# Patient Record
Sex: FEMALE | Race: WHITE | Employment: OTHER | URBAN - METROPOLITAN AREA
[De-identification: names, ages, dates, MRNs, and addresses within clinical notes are randomized per-mention and may not be internally consistent; named-entity substitution may affect disease eponyms.]

---

## 2017-01-03 DIAGNOSIS — F41.8 DEPRESSION WITH ANXIETY: Primary | ICD-10-CM

## 2017-01-03 NOTE — TELEPHONE ENCOUNTER
[facility] Call Center    Phone Message    Name of Caller: Nancy    Phone Number: Home number on file [phone] (home)    Best time to return call: ANY    May a detailed message be left on voicemail: yes    Relation to patient: Self    Reason for Call: Medication Refill Request    Has the patient contacted the pharmacy for the refill? Yes   Name of medication being requested: FLUoxetine (PROZAC) 20 MG capsule [66425] (Order [id number])  Provider who prescribed the medication: LEEROY  Pharmacy: [facility]  Date medication is needed: ASAP         Action Taken: Message routed to:  Primary Care p [phone]

## 2017-01-03 NOTE — TELEPHONE ENCOUNTER
FLUoxetine (PROZAC) 20 MG capsule     Last Written Prescription Date: 9/28/16  Last Fill Quantity: 90, # refills: 1  Last Office Visit with [facility] primary care provider:  11/28/16   Next 5 appointments (look out 90 days)     Jan 25, 2017  9:20 AM   [facility] with Candy Tru[facility]illo [facility]   [facility]    [address] [place] [address]   [phone]            Mar 06, 2017  9:20 AM   Office Visit with Candy Trujillo MD   [facility]    [address] [place] [address]   [phone]            Apr 03, 2017 10:30 AM   Return Visit with Lee Reyes MD, [facility] NURSE ONLY   [facility]    [address] [place] [address]   [phone]                   Last PHQ-9 score on record=   PHQ-9 SCORE 11/28/2016   Total Score 3

## 2017-01-04 NOTE — TELEPHONE ENCOUNTER
Medication filled for 3 months per protocol.      Katie Prater RN, [facility]

## 2017-01-23 ENCOUNTER — DOCUMENTATION ONLY (OUTPATIENT)
Dept: LAB | Facility: CLINIC | Age: 82
End: 2017-01-23

## 2017-01-23 DIAGNOSIS — D50.9 IRON DEFICIENCY ANEMIA, UNSPECIFIED IRON DEFICIENCY ANEMIA TYPE: Primary | ICD-10-CM

## 2017-03-06 ENCOUNTER — OFFICE VISIT (OUTPATIENT)
Dept: PEDIATRICS | Facility: CLINIC | Age: 82
End: 2017-03-06
Payer: COMMERCIAL

## 2017-03-06 VITALS
WEIGHT: 121.5 LBS | TEMPERATURE: 98.3 F | BODY MASS INDEX: 20.24 KG/M2 | DIASTOLIC BLOOD PRESSURE: 54 MMHG | SYSTOLIC BLOOD PRESSURE: 112 MMHG | HEART RATE: 52 BPM | HEIGHT: 65 IN | OXYGEN SATURATION: 94 %

## 2017-03-06 DIAGNOSIS — D50.8 IRON DEFICIENCY ANEMIA SECONDARY TO INADEQUATE DIETARY IRON INTAKE: ICD-10-CM

## 2017-03-06 DIAGNOSIS — G25.81 RESTLESS LEG SYNDROME: ICD-10-CM

## 2017-03-06 DIAGNOSIS — E78.5 HYPERLIPIDEMIA LDL GOAL <130: ICD-10-CM

## 2017-03-06 DIAGNOSIS — J42 CHRONIC BRONCHITIS, UNSPECIFIED CHRONIC BRONCHITIS TYPE (H): Primary | ICD-10-CM

## 2017-03-06 DIAGNOSIS — D50.9 IRON DEFICIENCY ANEMIA, UNSPECIFIED IRON DEFICIENCY ANEMIA TYPE: ICD-10-CM

## 2017-03-06 DIAGNOSIS — E78.5 HYPERLIPIDEMIA LDL GOAL <100: ICD-10-CM

## 2017-03-06 LAB
CHOLEST SERPL-MCNC: 212 MG/DL
ERYTHROCYTE [DISTWIDTH] IN BLOOD BY AUTOMATED COUNT: 16.1 % (ref 10–15)
HCT VFR BLD AUTO: 38.8 % (ref 35–47)
HDLC SERPL-MCNC: 104 MG/DL
HGB BLD-MCNC: 12.6 G/DL (ref 11.7–15.7)
IRON SATN MFR SERPL: 22 % (ref 15–46)
IRON SERPL-MCNC: 86 UG/DL (ref 35–180)
LDLC SERPL CALC-MCNC: 92 MG/DL
MCH RBC QN AUTO: 30.1 PG (ref 26.5–33)
MCHC RBC AUTO-ENTMCNC: 32.5 G/DL (ref 31.5–36.5)
MCV RBC AUTO: 93 FL (ref 78–100)
NONHDLC SERPL-MCNC: 108 MG/DL
PLATELET # BLD AUTO: 256 10E9/L (ref 150–450)
RBC # BLD AUTO: 4.19 10E12/L (ref 3.8–5.2)
RETICS # AUTO: 50.7 10E9/L (ref 25–95)
RETICS/RBC NFR AUTO: 1.2 % (ref 0.5–2)
TIBC SERPL-MCNC: 400 UG/DL (ref 240–430)
TRIGL SERPL-MCNC: 79 MG/DL
WBC # BLD AUTO: 6.9 10E9/L (ref 4–11)

## 2017-03-06 PROCEDURE — 99214 OFFICE O/P EST MOD 30 MIN: CPT | Performed by: INTERNAL MEDICINE

## 2017-03-06 PROCEDURE — 36415 COLL VENOUS BLD VENIPUNCTURE: CPT | Performed by: INTERNAL MEDICINE

## 2017-03-06 PROCEDURE — 83540 ASSAY OF IRON: CPT | Performed by: INTERNAL MEDICINE

## 2017-03-06 PROCEDURE — 85027 COMPLETE CBC AUTOMATED: CPT | Performed by: INTERNAL MEDICINE

## 2017-03-06 PROCEDURE — 83550 IRON BINDING TEST: CPT | Performed by: INTERNAL MEDICINE

## 2017-03-06 PROCEDURE — 85045 AUTOMATED RETICULOCYTE COUNT: CPT | Performed by: INTERNAL MEDICINE

## 2017-03-06 PROCEDURE — 80061 LIPID PANEL: CPT | Performed by: INTERNAL MEDICINE

## 2017-03-06 NOTE — NURSING NOTE
"Chief Complaint   Patient presents with     Recheck Medication       Initial /54 (BP Location: Right arm, Patient Position: Chair, Cuff Size: Adult Small)  Pulse 52  Temp 98.3  F (36.8  C) (Temporal)  Ht 5' 5\" (1.651 m)  Wt 121 lb 8 oz (55.1 kg)  SpO2 94%  BMI 20.22 kg/m2 Estimated body mass index is 20.22 kg/(m^2) as calculated from the following:    Height as of this encounter: 5' 5\" (1.651 m).    Weight as of this encounter: 121 lb 8 oz (55.1 kg).  Medication Reconciliation: complete     Brie Galindo CMA  "

## 2017-03-06 NOTE — MR AVS SNAPSHOT
After Visit Summary   3/6/2017    Nancy Benz    MRN: [id number]           Patient Information     Date Of Birth          8/5/1922        Visit Information        Provider Department      3/6/2017 9:20 AM Candy Trujillo MD [facility]        Today's Diagnoses     Chronic bronchitis, unspecified chronic bronchitis type (H)    -  1    Hyperlipidemia LDL goal <130        Restless leg syndrome        Iron deficiency anemia secondary to inadequate dietary iron intake        Hyperlipidemia LDL goal <100          Care Instructions    Make appointment(s) for:   -- annual wellness with fasting labs in 6 months.             Follow-ups after your visit        Your next 10 appointments already scheduled     Apr 03, 2017 10:30 AM CDT   Return Visit with Lee Reyes MD, [facility] NURSE ONLY   [facility])    [address] [place] [address] [phone]            Apr 04, 2017  9:00 AM CDT   Return Visit with Sobia Terrazas MD   [facility])    [address] [place] [address] [phone]            Apr 05, 2017 10:00 AM CDT   Return Visit with Sam Amato DPM   [facility])    [address] [place] [address] [phone]            Sep 27, 2017 10:00 AM CDT   Return Visit with Zayra Sanchez MD   [facility])    [address] [place] [id number] [phone]              Who to contact     If you have questions or need follow up information about today's clinic visit or your schedule please contact [facility] directly at [phone].  Normal or non-critical lab and imaging results will be communicated to you by MyChart, letter or phone within 4 business days after the clinic has received the results. If you  "do not hear from us within 7 days, please contact the clinic through [employer] or phone. If you have a critical or abnormal lab result, we will notify you by phone as soon as possible.  Submit refill requests through [employer] or call your pharmacy and they will forward the refill request to us. Please allow 3 business days for your refill to be completed.          Additional Information About Your Visit        [employer] Information     [employer] gives you secure access to your electronic health record. If you see a primary care provider, you can also send messages to your care team and make appointments. If you have questions, please call your primary care clinic.  If you do not have a primary care provider, please call [phone] and they will assist you.      [employer] is an electronic gateway that provides easy, online access to your medical records. With [employer], you can request a clinic appointment, read your test results, renew a prescription or communicate with your care team.     To access your existing account, please contact your [facility] or call [phone] for assistance.        Care EveryWhere ID     This is your Care EveryWhere ID. This could be used by other organizations to access your [place] medical records  [id number]        Your Vitals Were     Pulse Temperature Height Pulse Oximetry BMI (Body Mass Index)       52 98.3  F (36.8  C) (Temporal) 5' 5\" (1.651 m) 94% 20.22 kg/m2        Blood Pressure from Last 3 Encounters:   03/06/17 112/54   11/29/16 114/61   11/28/16 124/60    Weight from Last 3 Encounters:   03/06/17 121 lb 8 oz (55.1 kg)   11/29/16 124 lb (56.2 kg)   11/28/16 124 lb (56.2 kg)              We Performed the Following     Lipid Profile with reflex to direct LDL          Today's Medication Changes          These changes are accurate as of: 3/6/17 10:58 AM.  If you have any questions, ask your nurse or doctor.               These medicines have changed or " have updated prescriptions.        Dose/Directions    polyethylene glycol powder   Commonly known as:  MIRALAX   This may have changed:    - when to take this  - reasons to take this   Used for:  Chronic constipation        Dose:  1 capful   Take 17 g (1 capful) by mouth daily   Quantity:  510 g   Refills:  11                Primary Care Provider Office Phone # Fax #    Candy Trujillo [phone]       [facility] [address]  [facility] [address]        Thank you!     Thank you for choosing [facility]  for your care. Our goal is always to provide you with excellent care. Hearing back from our patients is one way we can continue to improve our services. Please take a few minutes to complete the written survey that you may receive in the mail after your visit with us. Thank you!             Your Updated Medication List - Protect others around you: Learn how to safely use, store and throw away your medicines at www.disposemymeds.org.          This list is accurate as of: 3/6/17 10:58 AM.  Always use your most recent med list.                   Brand Name Dispense Instructions for use    aspirin 81 MG tablet      Take 81 mg by mouth daily       bimatoprost 0.01 % Soln    LUMIGAN    3 Bottle    Place 1 drop into both eyes At Bedtime       brimonidine 0.15 % ophthalmic solution    ALPHAGAN-P     Apply 1 drop to eye       CALCIUM 600 + D 600-200 MG-UNIT Tabs   Generic drug:  Calcium Carb-Cholecalciferol      Take 1 tablet by mouth daily Vitamin F=6919 iu       ciclopirox 0.77 % cream    LOPROX    270 g    Apply topically 2 times daily To left big toe twice daily.       cycloSPORINE 0.05 % ophthalmic emulsion    RESTASIS    1 Box    Place 1 drop into both eyes every 12 hours       diltiazem 120 MG 24 hr capsule    DILACOR XR    90 capsule    Take 1 capsule (120 mg) by mouth daily       ferrous sulfate 325 (65 FE) MG tablet    IRON    30 tablet    Take 325 mg by mouth daily (with  breakfast) Reported on 3/6/2017       FLOVENT  MCG/ACT Inhaler   Generic drug:  fluticasone     36 g    INHALE 2 PUFFS INTO THE LUNGS 2 TIMES DAILY       FLUoxetine 20 MG capsule    PROzac    90 capsule    Take 1 capsule (20 mg) by mouth daily       fluticasone 50 MCG/ACT spray    FLONASE    1 Bottle    Spray 2 sprays into both nostrils daily       gabapentin 100 MG capsule    NEURONTIN    180 capsule    1-2 capsule late afternoon       GENTEAL SEVERE 0.3 % Gel ophthalmic gel   Generic drug:  hypromellose      Place 0.1 g (2 drops) into both eyes At Bedtime       polyethylene glycol powder    MIRALAX    510 g    Take 17 g (1 capful) by mouth daily       pravastatin 40 MG tablet    PRAVACHOL    45 tablet    Take 0.5 tablets (20 mg) by mouth daily       SYSTANE 0.4-0.3 % Soln ophthalmic solution   Generic drug:  polyethylene glycol 0.4%- propylene glycol 0.3%      Place 1 drop into both eyes 3 times daily as needed for dry eyes       VENTOLIN HFA IN      Inhale 2 puffs into the lungs every 6 hours as needed       zolpidem 5 MG tablet    AMBIEN    90 tablet    Take 0.5-1 tablets (2.5-5 mg) by mouth nightly as needed for sleep

## 2017-03-06 NOTE — PROGRESS NOTES
Dear Nancy,   Here are your recent results.   -- lipid panel a little worse than last fall. So go back to take pravastatin daily instead of every other day as we discussed.     Please call or Mychart to our office if you have further questions.     Candy Trujillo MD

## 2017-03-06 NOTE — PROGRESS NOTES
SUBJECTIVE:                                                    Nancy Benz is a 89 year old female who presents to clinic today for the following health issues:      COPD Follow-Up    Symptoms are currently: stable    Current fatigue or dyspnea with ambulation: stable     Shortness of breath: stable/Not much energy    Increased or change in Cough/Sputum: No    Fever(s): No    Baseline ambulation without stopping to rest about the same feet, blocks. Able to walk up one block with walker flights of stairs without stopping to rest. Cannot do stairs    Any ER/UC or hospital admissions since your last visit? No     History   Smoking Status     Never Smoker   Smokeless Tobacco     Not on file     No results found for: FEV1, ATJ2ZCW       Amount of exercise or physical activity: 3-4 days/week for an average of 25 minutes    Problems taking medications regularly: No    Medication side effects: weight gain  Diet: regular (no restrictions)      Updates:  1. Has several eye drops prescribed by her eye doctor.   2. Last 3 weeks, she wanted to cut down on her medication so has been taking diltiazem and pravastatin every other day.   3. Has a cold, clear runny nose for 3 weeks. But then all gone.   4. Leg swelling was better after stopping Requip, gabapentin is helping with restless leg. She has to take 2 capsules (100 mg/capsule). A podiatrist recommended she exercise her. She is doing more leg exercise. She thinks this helps.   5. Chronic dizziness: she has concluded that this is from her eyes. She feels better if she does not strain her eyes with reading but she loves reading.   6. COPD: her Flovent didn't arrive on time for a few days, she could tell she was more shortness of breath. Now back on Flovent, breathing is good now.   7. Iron deficiency anemia: negative hemoccult. She was put on iron supplement. Took it for only a few weeks, upsetting body and quit. She has increased spinach and iron rich foods in her diet.  "Has lab for recheck today.          Problem list, Medication list, Allergies, and Medical/Social/Surgical histories reviewed in [facility] and updated as appropriate.    OBJECTIVE:                                                    /54 (BP Location: Right arm, Patient Position: Chair, Cuff Size: Adult Small)  Pulse 52  Temp 98.3  F (36.8  C) (Temporal)  Ht 5' 5\" (1.651 m)  Wt 121 lb 8 oz (55.1 kg)  SpO2 94%  BMI 20.22 kg/m2    GEN: healthy, alert and no distress  HEENT: unremarkable.  Neck:     supple, no thyromegaly, no cervical lymphadenopathy.   CONCHIS:  CTA B/L, no wheezing or crackles.  CV:  RRR no murmur.  Intact distal pulses, good cap refill.  Abd: soft, normal active bowel sounds, non tender, non distended. No mass or organomegaly.   Ext:  no cyanosis, clubbing or edema.         Diagnostic test results:  Results for orders placed or performed in visit on 03/06/17 (from the past 24 hour(s))   Lipid Profile with reflex to direct LDL   Result Value Ref Range    Cholesterol 212 (H) <200 mg/dL    Triglycerides 79 <150 mg/dL    HDL Cholesterol 104 >49 mg/dL    LDL Cholesterol Calculated 92 <100 mg/dL    Non HDL Cholesterol 108 <130 mg/dL       Orders Only on 03/06/2017   Component Date Value Ref Range Status     WBC 03/06/2017 6.9  4.0 - 11.0 10e9/L Final     RBC Count 03/06/2017 4.19  3.8 - 5.2 10e12/L Final     Hemoglobin 03/06/2017 12.6  11.7 - 15.7 g/dL Final     Hematocrit 03/06/2017 38.8  35.0 - 47.0 % Final     MCV 03/06/2017 93  78 - 100 fl Final     MCH 03/06/2017 30.1  26.5 - 33.0 pg Final     MCHC 03/06/2017 32.5  31.5 - 36.5 g/dL Final     RDW 03/06/2017 16.1* 10.0 - 15.0 % Final     Platelet Count 03/06/2017 256  150 - 450 10e9/L Final     Iron 03/06/2017 86  35 - 180 ug/dL Final     Iron Binding Cap 03/06/2017 400  240 - 430 ug/dL Final     Iron Saturation Index 03/06/2017 22  15 - 46 % Final     % Retic 03/06/2017 1.2  0.5 - 2.0 % Final     Absolute Retic 03/06/2017 50.7  25 - 95 10e9/L Final      "     ASSESSMENT/PLAN:                                                      89 year old female with the following diagnoses and treatment plan:      ICD-10-CM    1. Chronic bronchitis, unspecified chronic bronchitis type (H) J42    2. Hyperlipidemia LDL goal <130 E78.5 Lipid Profile with reflex to direct LDL   3. Restless leg syndrome G25.81    4. Iron deficiency anemia secondary to inadequate dietary iron intake D50.8    5. Hyperlipidemia LDL goal <100 E78.5        hemoglboin and iron level improved with diet change. May continue.   Lipid panel is a little worse with 3 weeks of pravastatin qod (20mg qod). Advised to resume daily dosing.   Continue diltiazem daily.   No change in her other medications.  Return in 6 months for wellness.     A total of 25 minutes was spent face to face with this patient. More than 50% of the time was spent on education for the above problems and management plans.     Candy Trujillo MD-PhD  [facility]    (Note: Chart documentation was done in part with Dragon Voice Recognition software. Although reviewed after completion, some word and grammatical errors may remain.)

## 2017-03-07 DIAGNOSIS — H40.1132 PRIMARY OPEN ANGLE GLAUCOMA OF BOTH EYES, MODERATE STAGE: Primary | ICD-10-CM

## 2017-03-07 RX ORDER — BRIMONIDINE TARTRATE AND TIMOLOL MALEATE 2; 5 MG/ML; MG/ML
1 SOLUTION OPHTHALMIC 2 TIMES DAILY
Qty: 10 ML | Refills: 1 | Status: SHIPPED | OUTPATIENT
Start: 2017-03-07 | End: 2017-08-09

## 2017-03-07 NOTE — TELEPHONE ENCOUNTER
Called and verified w/ pt that she is using combigan bid in both eyes.    And that she is not allergic to topical timolol.    Janeth ANTONIO. COA.

## 2017-03-07 NOTE — TELEPHONE ENCOUNTER
Provider: Amy    Faxed refill request from: call from pt    Medication request: Combigan  Prescription written:   Last filled: 10/17/16  Last Qty: 30ml    Pt's last office visit: 8/2016  Next scheduled office visit: 4/2017  Ok to fill w/ one refill until her appt next month  Janeth WU

## 2017-03-09 NOTE — PROGRESS NOTES
Dear Nancy,   Here are your recent results.   -- blood count and iron storage have return to normal. Likely your diet was deficient in iron before and with diet change, there is improvement. Continue iron rich foods as you are doing. We can monitor this periodically (every 6 months to a year).     Please call or Mychart to our office if you have further questions.     Candy Trujillo MD

## 2017-03-14 ENCOUNTER — MYC MEDICAL ADVICE (OUTPATIENT)
Dept: PHARMACY | Facility: CLINIC | Age: 82
End: 2017-03-14

## 2017-03-24 ENCOUNTER — OFFICE VISIT (OUTPATIENT)
Dept: OTOLARYNGOLOGY | Facility: CLINIC | Age: 82
End: 2017-03-24
Payer: COMMERCIAL

## 2017-03-24 VITALS
BODY MASS INDEX: 23.75 KG/M2 | HEART RATE: 52 BPM | DIASTOLIC BLOOD PRESSURE: 57 MMHG | WEIGHT: 121 LBS | HEIGHT: 60 IN | SYSTOLIC BLOOD PRESSURE: 112 MMHG

## 2017-03-24 DIAGNOSIS — H61.23 BILATERAL IMPACTED CERUMEN: Primary | ICD-10-CM

## 2017-03-24 PROCEDURE — 69210 REMOVE IMPACTED EAR WAX UNI: CPT | Mod: 50 | Performed by: OTOLARYNGOLOGY

## 2017-03-24 NOTE — MR AVS SNAPSHOT
After Visit Summary   3/24/2017    Nancy Benz    MRN: [id number]           Patient Information     Date Of Birth          8/5/1922        Visit Information        Provider Department      3/24/2017 9:30 AM Sarahi Hernandez MD [facility]         Follow-ups after your visit        Your next 10 appointments already scheduled     Mar 24, 2017  9:30 AM CDT   Return Visit [facility]   [facility]    [address] [place] [address]   [phone]            Apr 03, 2017 10:30 AM CDT   Return Visit with Lee Reyes MD, [facility] NURSE ONLY   [facility]    [address] [place] [address]   [phone]            Apr 04, 2017  9:00 AM CDT   Return Visit with Sobia Terrazas MD   [facility]    [address] [place] [address]   [phone]            Sep 11, 2017  8:40 AM CDT   LAB with LAB FIRST FLOOR [facility])    [address] [place] [address]   [phone]           Patient must bring picture ID.  Patient should be prepared to give a urine specimen  Please do not eat 10-12 hours before your appointment if you are coming in fasting for labs on lipids, cholesterol, or glucose (sugar).  Pregnant women should follow their Care Team instructions. Water with medications is okay. Do not drink coffee or other fluids.   If you have concerns about taking  your medications, please ask at office or if scheduling via [facility], send a message by clicking on Secure Messaging, Message Your Care Team.            Sep 11, 2017  9:00 AM CDT   PHYSICAL with Candy Trujillo MD   [facility]    [address] [place] [address]   [phone]            Sep 27, 2017  10:00 AM CDT   Return Visit with Zayra Sanchez MD   [facility]    [address] [place] [address] [phone]            Oct 27, 2017  9:00 AM CDT   Return Visit with Sarahi Hernandez MD   [facility]    [address] [address] [address] [phone]              Who to contact     If you have questions or need follow up information about today's clinic visit or your schedule please contact [facility] directly at [phone].  Normal or non-critical lab and imaging results will be communicated to you by [employer], letter or phone within 4 business days after the clinic has received the results. If you do not hear from us within 7 days, please contact the clinic through [employer] or phone. If you have a critical or abnormal lab result, we will notify you by phone as soon as possible.  Submit refill requests through [facility] or call your pharmacy and they will forward the refill request to us. Please allow 3 business days for your refill to be completed.          Additional Information About Your Visit        [employer] Information     [employer] gives you secure access to your electronic health record. If you see a primary care provider, you can also send messages to your care team and make appointments. If you have questions, please call your primary care clinic.  If you do not have a primary care provider, please call [phone] and they will assist you.      [employer] is an electronic gateway that provides easy, online access to your medical records. With [employer], you can request a clinic appointment, read your test results, renew a prescription or communicate with your care team.     To access your existing account, please contact your [facility] or call [phone] for assistance.        Care EveryWhere ID     This is your Care  EveryWhere ID. This could be used by other organizations to access your [place] medical records  [id number]        Your Vitals Were     Pulse Height BMI (Body Mass Index)             52 1.524 m (5') 23.63 kg/m2          Blood Pressure from Last 3 Encounters:   03/24/17 112/57   03/06/17 112/54   11/29/16 114/61    Weight from Last 3 Encounters:   03/24/17 54.9 kg (121 lb)   03/06/17 55.1 kg (121 lb 8 oz)   11/29/16 56.2 kg (124 lb)              Today, you had the following     No orders found for display         Today's Medication Changes          These changes are accurate as of: 3/24/17  9:15 AM.  If you have any questions, ask your nurse or doctor.               These medicines have changed or have updated prescriptions.        Dose/Directions    polyethylene glycol powder   Commonly known as:  MIRALAX   This may have changed:    - when to take this  - reasons to take this   Used for:  Chronic constipation        Dose:  1 capful   Take 17 g (1 capful) by mouth daily   Quantity:  510 g   Refills:  11                Primary Care Provider Office Phone # Fax #    Candy Trujillo [phone]       [facility] [address] [facility] [address]        Thank you!     Thank you for choosing [facility]  for your care. Our goal is always to provide you with excellent care. Hearing back from our patients is one way we can continue to improve our services. Please take a few minutes to complete the written survey that you may receive in the mail after your visit with us. Thank you!             Your Updated Medication List - Protect others around you: Learn how to safely use, store and throw away your medicines at www.disposemymeds.org.          This list is accurate as of: 3/24/17  9:15 AM.  Always use your most recent med list.                   Brand Name Dispense Instructions for use    aspirin 81 MG tablet      Take 81 mg by mouth daily       bimatoprost 0.01 % Soln    LUMIGAN     3 Bottle    Place 1 drop into both eyes At Bedtime       brimonidine 0.15 % ophthalmic solution    ALPHAGAN-P     Apply 1 drop to eye       brimonidine-timolol 0.2-0.5 % ophthalmic solution    COMBIGAN    10 mL    Place 1 drop into both eyes 2 times daily       CALCIUM 600 + D 600-200 MG-UNIT Tabs   Generic drug:  Calcium Carb-Cholecalciferol      Take 1 tablet by mouth daily Vitamin T=0585 iu       ciclopirox 0.77 % cream    LOPROX    270 g    Apply topically 2 times daily To left big toe twice daily.       cycloSPORINE 0.05 % ophthalmic emulsion    RESTASIS    1 Box    Place 1 drop into both eyes every 12 hours       diltiazem 120 MG 24 hr capsule    DILACOR XR    90 capsule    Take 1 capsule (120 mg) by mouth daily       ferrous sulfate 325 (65 FE) MG tablet    IRON    30 tablet    Take 325 mg by mouth daily (with breakfast) Reported on 3/6/2017       FLOVENT  MCG/ACT Inhaler   Generic drug:  fluticasone     36 g    INHALE 2 PUFFS INTO THE LUNGS 2 TIMES DAILY       FLUoxetine 20 MG capsule    PROzac    90 capsule    Take 1 capsule (20 mg) by mouth daily       fluticasone 50 MCG/ACT spray    FLONASE    1 Bottle    Spray 2 sprays into both nostrils daily       gabapentin 100 MG capsule    NEURONTIN    180 capsule    1-2 capsule late afternoon       GENTEAL SEVERE 0.3 % Gel ophthalmic gel   Generic drug:  hypromellose      Place 0.1 g (2 drops) into both eyes At Bedtime       polyethylene glycol powder    MIRALAX    510 g    Take 17 g (1 capful) by mouth daily       pravastatin 40 MG tablet    PRAVACHOL    45 tablet    Take 0.5 tablets (20 mg) by mouth daily       SYSTANE 0.4-0.3 % Soln ophthalmic solution   Generic drug:  polyethylene glycol 0.4%- propylene glycol 0.3%      Place 1 drop into both eyes 3 times daily as needed for dry eyes       VENTOLIN HFA IN      Inhale 2 puffs into the lungs every 6 hours as needed       zolpidem 5 MG tablet    AMBIEN    90 tablet    Take 0.5-1 tablets (2.5-5 mg) by  mouth nightly as needed for sleep

## 2017-03-24 NOTE — NURSING NOTE
Nancy Benz's goals for this visit include:   Chief Complaint   Patient presents with     RECHECK     ear cleaning       She requests these members of her care team be copied on today's visit information: yes      PCP: Candy Trujillo    Referring Provider:  No referring provider defined for this encounter.    Chief Complaint   Patient presents with     RECHECK     ear cleaning       Initial /57  Pulse 52  Ht 1.524 m (5')  Wt 54.9 kg (121 lb)  BMI 23.63 kg/m2 Estimated body mass index is 23.63 kg/(m^2) as calculated from the following:    Height as of this encounter: 1.524 m (5').    Weight as of this encounter: 54.9 kg (121 lb).  Medication Reconciliation: complete

## 2017-03-29 DIAGNOSIS — F41.8 DEPRESSION WITH ANXIETY: ICD-10-CM

## 2017-03-29 NOTE — TELEPHONE ENCOUNTER
[facility] Call Center    Phone Message    Name of Caller: Nancy    Phone Number: Home number on file [phone] (home)    Best time to return call: any    May a detailed message be left on voicemail: yes    Relation to patient: Self    Reason for Call: Medication Refill Request    Has the patient contacted the pharmacy for the refill? Yes   Name of medication being requested: FLUoxetine (PROZAC) 20 MG capsule  Provider who prescribed the medication: Dr. Trujillo  Pharmacy: [facility]  Date medication is needed: asap         Action Taken: Message routed to:  Primary Care p [id number]

## 2017-03-30 NOTE — TELEPHONE ENCOUNTER
Prescription approved per [facility] Refill Protocol.    Almaz Mclean RN, [facility]      Fluoxetine 20 mg       Last Written Prescription Date: 1/4/17  Last Fill Quantity: 90; # refills: 0  Last Office Visit with [facility], [facility] or [facility] prescribing provider:  3/6/17     Next 5 appointments (look out 90 days)     Apr 03, 2017 10:30 AM CDT   Return Visit with Lee Reyes MD, [facility] NURSE ONLY   [facility]    [address] [place] [id number] [phone]            Apr 04, 2017  9:00 AM CDT   Return Visit with Sobia Terrazas MD   [facility]    [address] [place] [id number] [phone]                   Last PHQ-9 score on record=   PHQ-9 SCORE 11/28/2016   Total Score 3       Lab Results   Component Value Date    AST 20 10/23/2015     Lab Results   Component Value Date    ALT 15 08/01/2016

## 2017-04-03 ENCOUNTER — OFFICE VISIT (OUTPATIENT)
Dept: OPHTHALMOLOGY | Facility: CLINIC | Age: 82
End: 2017-04-03
Payer: COMMERCIAL

## 2017-04-03 DIAGNOSIS — H26.492 PCO (POSTERIOR CAPSULAR OPACIFICATION), LEFT: ICD-10-CM

## 2017-04-03 DIAGNOSIS — H04.129 DRY EYE: ICD-10-CM

## 2017-04-03 DIAGNOSIS — H40.1130 PRIMARY OPEN ANGLE GLAUCOMA OF BOTH EYES, UNSPECIFIED GLAUCOMA STAGE: Primary | ICD-10-CM

## 2017-04-03 PROCEDURE — 66821 AFTER CATARACT LASER SURGERY: CPT | Mod: LT | Performed by: OPHTHALMOLOGY

## 2017-04-03 PROCEDURE — 92133 CPTRZD OPH DX IMG PST SGM ON: CPT | Performed by: OPHTHALMOLOGY

## 2017-04-03 ASSESSMENT — REFRACTION_WEARINGRX
OS_SPHERE: -1.00
OS_CYLINDER: +1.00
OS_AXIS: 179
SPECS_TYPE: TRIFOCAL
OD_AXIS: 025
OD_SPHERE: -1.25
OD_CYLINDER: +1.00
OD_ADD: +3.00
OS_ADD: +3.00

## 2017-04-03 ASSESSMENT — PACHYMETRY
OD_CT(UM): 514
OS_CT(UM): 540

## 2017-04-03 ASSESSMENT — VISUAL ACUITY
METHOD: SNELLEN - LINEAR
OS_CC: 20/50
OD_CC: UNABLE
CORRECTION_TYPE: GLASSES
OD_CC: CF
OS_CC+: -2
OS_CC: 20/25

## 2017-04-03 ASSESSMENT — EXTERNAL EXAM - RIGHT EYE: OD_EXAM: S/P PTOSIS REPAIR

## 2017-04-03 ASSESSMENT — REFRACTION_MANIFEST
OS_ADD: +3.00
OS_AXIS: 005
OS_CYLINDER: +1.50
OD_AXIS: 015
OD_ADD: +3.00
OS_SPHERE: -1.00
OD_SPHERE: -1.25
OD_CYLINDER: +1.00

## 2017-04-03 ASSESSMENT — LEVATOR FUNCTION
OS_LEVATOR: 08
OD_LEVATOR: 11

## 2017-04-03 ASSESSMENT — TONOMETRY
OS_IOP_MMHG: 16
IOP_METHOD: TONOPEN
OD_IOP_MMHG: 18

## 2017-04-03 ASSESSMENT — MARGIN REFLEX DISTANCE
OD_MRD1: 0
OS_MRD1: 0

## 2017-04-03 ASSESSMENT — LAGOPHTHALMOS
OS_LAGOPHTHALMOS: 0
OD_LAGOPHTHALMOS: 0

## 2017-04-03 ASSESSMENT — SLIT LAMP EXAM - LIDS
COMMENTS: NORMAL
COMMENTS: NORMAL

## 2017-04-03 ASSESSMENT — EXTERNAL EXAM - LEFT EYE: OS_EXAM: S/P PTOSIS REPAIR

## 2017-04-03 NOTE — PROGRESS NOTES
Assessment & Plan   Nancy Benz is a 89 year old female who presents with:   Review of systems for the eyes was negative other than the pertinent positives and negatives noted in the HPI.  History is obtained from the patient and spouse.    Glaucoma, both eyes  Continue Lumigan qhs and combigan bid  - OCT Optic Nerve RNFL Optovue OU (both eyes)  - HVF 24-2 OU    Dry eye  Use 1% hydrocortisone cream on skin around the eye 3-4 times daily    PCO (posterior capsular opacification), left  - YAG Capsulotomy OS (left eye)  - PF qid x 7 days  - return in 1 week for refraction      Return in 6 mo for iOP check, no dfe (no HVF or OCT)    Documentation for today's encounter was performed by Janeth NEGRON.  Acting as a scribe in my presence. I have reviewed and verified that it is an accurate recording of today's encounter.    Attending Physician Attestation:  Complete documentation of historical and exam elements from today's encounter can be found in the full encounter summary report (not reduplicated in this progress note).  I personally obtained the chief complaint(s) and history of present illness.  I confirmed and edited as necessary the review of systems, past medical/surgical history, family history, social history, and examination findings as documented by others; and I examined the patient myself.  I personally reviewed the relevant tests, images, and reports as documented above.  I formulated and edited as necessary the assessment and plan and discussed the findings and management plan with the patient and family. - Lee Reyes MD

## 2017-04-03 NOTE — MR AVS SNAPSHOT
After Visit Summary   4/3/2017    Nancy Benz    MRN: [id number]           Patient Information     Date Of Birth          8/5/1922        Visit Information        Provider Department      4/3/2017 10:30 AM Lee Reyes MD;  OPHTH NURSE ONLY [facility]        Today's Diagnoses     Glaucoma, both eyes    -  1    Dry eye          Care Instructions    Use 1% hydrocortisone cream under the eyes for the redness    YAG Capsulotomy/Iridotomy Laser Surgery    Postoperative Instructions    Postoperative Medications: After surgery, you will use one eye drop for seven days. Which you will start these eye drops on the day of surgery. Your first dose will be given in the clinic prior to you leaving.  Prednisolone - is a steroid eye drop, used to minimize inflammation and modulate healing. It should be used 4 times daily for 1 week. It is a suspension so you will need to shake it before use.  (Name brands for Prednisilone include: Pred Forte, Omnipred,  Econopred, Durezol)  A convenient way of remembering the drops is to use them at Breakfast, Lunch, Dinner,and Bedtime.  The drops might sting a little when they are instilled, and that is normal.  Please continue any glaucoma, dry eye, or other medications you were using prior to the surgery. Wait 1-2 minutes between eye drops.  Please allow 24 to 48 hours when requesting refills, and call BEFORE you run out of  drops.  Restriction on Activities:  - You may develop a slight headache following the treatment. If desired, a pain reliever such as Ibuprofen, Advil, or Motrin may be used.  - It is fine to bathe, read, watch TV, and use the computer.  Symptoms requiring medical attention:  - Sudden onset of increased flashes and/or floaters beyond what you had prior to  leaving the surgery center.  - Persistent or increasing pain in the eye  - Sudden decrease in vision  - Nausea or vomiting  If you have any questions or concerns before or  after your surgery, please contact  Dr. Reyes s office at [phone]          Follow-ups after your visit        Your next 10 appointments already scheduled     Apr 04, 2017  9:00 AM CDT   Return Visit with Sobia Terrazas MD   [facility])    [address] [facility] [address]   [phone]            Apr 10, 2017 10:15 AM CDT   Return Visit with Lee Reyes MD   [facility])    [address] [facility] [address]   [phone]            Sep 11, 2017  8:40 AM CDT   LAB with LAB FIRST FLOOR [facility])    [address] [facility] [address]   [phone]           Patient must bring picture ID.  Patient should be prepared to give a urine specimen  Please do not eat 10-12 hours before your appointment if you are coming in fasting for labs on lipids, cholesterol, or glucose (sugar).  Pregnant women should follow their Care Team instructions. Water with medications is okay. Do not drink coffee or other fluids.   If you have concerns about taking  your medications, please ask at office or if scheduling via [facility], send a message by clicking on Secure Messaging, Message Your Care Team.            Sep 11, 2017  9:00 AM CDT   PHYSICAL with Candy Trujillo MD   [facility])    [address] [facility] [address]   [phone]            Sep 27, 2017 10:00 AM CDT   Return Visit with Zayra Sanchez MD   [facility])    [address] [facility] [address]   [phone]            Oct 27, 2017  9:00 AM CDT   Return Visit with Sarahi Hernandez MD   [facility])    [address] [facility] [address]   [phone]              Who to contact      If you have questions or need follow up information about today's clinic visit or your schedule please contact [facility] directly at [phone].  Normal or non-critical lab and imaging results will be communicated to you by [employer], letter or phone within 4 business days after the clinic has received the results. If you do not hear from us within 7 days, please contact the clinic through [employer] or phone. If you have a critical or abnormal lab result, we will notify you by phone as soon as possible.  Submit refill requests through [facility] or call your pharmacy and they will forward the refill request to us. Please allow 3 business days for your refill to be completed.          Additional Information About Your Visit        [facility] Information     [facility] gives you secure access to your electronic health record. If you see a primary care provider, you can also send messages to your care team and make appointments. If you have questions, please call your primary care clinic.  If you do not have a primary care provider, please call [phone] and they will assist you.      [facility] is an electronic gateway that provides easy, online access to your medical records. With [facility], you can request a clinic appointment, read your test results, renew a prescription or communicate with your care team.     To access your existing account, please contact your [facility] or call [phone] for assistance.        Care EveryWhere ID     This is your Care EveryWhere ID. This could be used by other organizations to access your [place] medical records  [id number]         Blood Pressure from Last 3 Encounters:   03/24/17 112/57   03/06/17 112/54   11/29/16 114/61    Weight from Last 3 Encounters:   03/24/17 54.9 kg (121 lb)   03/06/17 55.1 kg (121 lb 8 oz)   11/29/16 56.2 kg (124 lb)              We Performed the Following     HVF 24-2 OU     OCT Optic Nerve RNFL Optovue OU  (both eyes)          Today's Medication Changes          These changes are accurate as of: 4/3/17 11:45 AM.  If you have any questions, ask your nurse or doctor.               These medicines have changed or have updated prescriptions.        Dose/Directions    polyethylene glycol powder   Commonly known as:  MIRALAX   This may have changed:    - when to take this  - reasons to take this   Used for:  Chronic constipation        Dose:  1 capful   Take 17 g (1 capful) by mouth daily   Quantity:  510 g   Refills:  11                Primary Care Provider Office Phone # Fax #    Candy Trujillo [phone]       [facility] [address]  [facility] [address]        Thank you!     Thank you for choosing [facility]  for your care. Our goal is always to provide you with excellent care. Hearing back from our patients is one way we can continue to improve our services. Please take a few minutes to complete the written survey that you may receive in the mail after your visit with us. Thank you!             Your Updated Medication List - Protect others around you: Learn how to safely use, store and throw away your medicines at www.disposemymeds.org.          This list is accurate as of: 4/3/17 11:45 AM.  Always use your most recent med list.                   Brand Name Dispense Instructions for use    aspirin 81 MG tablet      Take 81 mg by mouth daily       bimatoprost 0.01 % Soln    LUMIGAN    3 Bottle    Place 1 drop into both eyes At Bedtime       brimonidine 0.15 % ophthalmic solution    ALPHAGAN-P     Apply 1 drop to eye       brimonidine-timolol 0.2-0.5 % ophthalmic solution    COMBIGAN    10 mL    Place 1 drop into both eyes 2 times daily       CALCIUM 600 + D 600-200 MG-UNIT Tabs   Generic drug:  Calcium Carb-Cholecalciferol      Take 1 tablet by mouth daily Vitamin K=1429 iu       ciclopirox 0.77 % cream    LOPROX    270 g    Apply topically 2 times daily To left big toe  twice daily.       cycloSPORINE 0.05 % ophthalmic emulsion    RESTASIS    1 Box    Place 1 drop into both eyes every 12 hours       diltiazem 120 MG 24 hr capsule    DILACOR XR    90 capsule    Take 1 capsule (120 mg) by mouth daily       ferrous sulfate 325 (65 FE) MG tablet    IRON    30 tablet    Take 325 mg by mouth daily (with breakfast) Reported on 3/6/2017       FLOVENT  MCG/ACT Inhaler   Generic drug:  fluticasone     36 g    INHALE 2 PUFFS INTO THE LUNGS 2 TIMES DAILY       FLUoxetine 20 MG capsule    PROzac    90 capsule    Take 1 capsule (20 mg) by mouth daily       fluticasone 50 MCG/ACT spray    FLONASE    1 Bottle    Spray 2 sprays into both nostrils daily       gabapentin 100 MG capsule    NEURONTIN    180 capsule    1-2 capsule late afternoon       GENTEAL SEVERE 0.3 % Gel ophthalmic gel   Generic drug:  hypromellose      Place 0.1 g (2 drops) into both eyes At Bedtime       polyethylene glycol powder    MIRALAX    510 g    Take 17 g (1 capful) by mouth daily       pravastatin 40 MG tablet    PRAVACHOL    45 tablet    Take 0.5 tablets (20 mg) by mouth daily       SYSTANE 0.4-0.3 % Soln ophthalmic solution   Generic drug:  polyethylene glycol 0.4%- propylene glycol 0.3%      Place 1 drop into both eyes 3 times daily as needed for dry eyes       VENTOLIN HFA IN      Inhale 2 puffs into the lungs every 6 hours as needed       zolpidem 5 MG tablet    AMBIEN    90 tablet    Take 0.5-1 tablets (2.5-5 mg) by mouth nightly as needed for sleep

## 2017-04-04 ENCOUNTER — TELEPHONE (OUTPATIENT)
Dept: DERMATOLOGY | Facility: CLINIC | Age: 82
End: 2017-04-04

## 2017-04-04 ENCOUNTER — OFFICE VISIT (OUTPATIENT)
Dept: DERMATOLOGY | Facility: CLINIC | Age: 82
End: 2017-04-04
Payer: COMMERCIAL

## 2017-04-04 DIAGNOSIS — D48.5 NEOPLASM OF UNCERTAIN BEHAVIOR OF SKIN: ICD-10-CM

## 2017-04-04 DIAGNOSIS — L30.9 DERMATITIS: ICD-10-CM

## 2017-04-04 DIAGNOSIS — L71.9 ACNE ROSACEA: Primary | ICD-10-CM

## 2017-04-04 DIAGNOSIS — B07.8 OTHER VIRAL WARTS: ICD-10-CM

## 2017-04-04 DIAGNOSIS — L82.1 SEBORRHEIC KERATOSIS: ICD-10-CM

## 2017-04-04 PROCEDURE — 99213 OFFICE O/P EST LOW 20 MIN: CPT | Mod: 25 | Performed by: DERMATOLOGY

## 2017-04-04 PROCEDURE — 11100 HC BIOPSY SKIN/SUBQ/MUC MEM, SINGLE LESION: CPT | Mod: 59 | Performed by: DERMATOLOGY

## 2017-04-04 PROCEDURE — 88305 TISSUE EXAM BY PATHOLOGIST: CPT | Performed by: DERMATOLOGY

## 2017-04-04 PROCEDURE — 17110 DESTRUCTION B9 LES UP TO 14: CPT | Performed by: DERMATOLOGY

## 2017-04-04 RX ORDER — HYDROCORTISONE 25 MG/G
OINTMENT TOPICAL
Qty: 30 G | Refills: 0 | Status: SHIPPED | OUTPATIENT
Start: 2017-04-04 | End: 2017-06-06

## 2017-04-04 NOTE — PATIENT INSTRUCTIONS
Cryotherapy    What is it?    Use of a very cold liquid, such as liquid nitrogen, to freeze and destroy abnormal skin cells that need to be removed    What should I expect?    Tenderness and redness    A small blister that might grow and fill with dark purple blood. There may be crusting.    More than one treatment may be needed if the lesions do not go away.    How do I care for the treated area?    Gently wash the area with your hands when bathing.    Use a thin layer of Vaseline to help with healing. You may use a Band-Aid.     The area should heal within 7-10 days and may leave behind a pink or lighter color.     Do not use an antibiotic or Neosporin ointment.     You may take acetaminophen (Tylenol) for pain.     Call your Doctor if you have:    Severe pain    Signs of infection (warmth, redness, cloudy yellow drainage, and or a bad smell)    Questions or concerns    Who should I call with questions?       [facility]: [phone]       [facility]: [phone]       For urgent needs outside of business hours call the [facility] at [phone]        and ask for the dermatology resident on call    Wound Care After a Biopsy    What is a skin biopsy?  A skin biopsy allows the doctor to examine a very small piece of tissue under the microscope to determine the diagnosis and the best treatment for the skin condition. A local anesthetic (numbing medicine)  is injected with a very small needle into the skin area to be tested. A small piece of skin is taken from the area. Sometimes a suture (stitch) is used.     What are the risks of a skin biopsy?  I will experience scar, bleeding, swelling, pain, crusting and redness. I may experience incomplete removal or recurrence. Risks of this procedure are excessive bleeding, bruising, infection, nerve damage, numbness, thick (hypertrophic or keloidal) scar and non-diagnostic biopsy.    How should I care  for my wound for the first 24 hours?    Keep the wound dry and covered for 24 hours    If it bleeds, hold direct pressure on the area for 15 minutes. If bleeding does not stop then go to the emergency room    Avoid strenuous exercise the first 1-2 days or as your doctor instructs you    How should I care for the wound after 24 hours?    After 24 hours, remove the bandage    You may bathe or shower as normal    If you had a scalp biopsy, you can shampoo as usual and can use shower water to clean the biopsy site daily    Clean the wound twice a day with gentle soap and water    Do not scrub, be gentle    Apply white petroleum/Vaseline after cleaning the wound with a cotton swab or a clean finger, and keep the site covered with a Bandaid /bandage. Bandages are not necessary with a scalp biopsy    If you are unable to cover the site with a Bandaid /bandage, re-apply ointment 2-3 times a day to keep the site moist. Moisture will help with healing    Avoid strenuous activity for first 1-2 days    Avoid lakes, rivers, pools, and oceans until the stitches are removed or the site is healed    How do I clean my wound?    Wash hands for 15 minutes with soap or use hand  before all wound care    Clean the wound with gentle soap and water    Apply white petroleum/Vaseline  to wound after it is clean    Replace the Bandaid /bandage to keep the wound covered for the first few days or as instructed by your doctor    If you had a scalp biopsy, warm shower water to the area on a daily basis should suffice    What should I use to clean my wound?     Cotton-tipped applicators (Qtips )    White petroleum jelly (Vaseline ). Use a clean new container and use Q-tips to apply.    Bandaids   as needed    Gentle soap     How should I care for my wound long term?    Do not get your wound dirty    Keep up with wound care for one week or until the area is healed.    A small scab will form and fall off by itself when the area is  completely healed. The area will be red and will become pink in color as it heals. Sun protection is very important for how your scar will turn out. Sunscreen with an SPF 30 or greater is recommended once the area is healed.    You should have some soreness but it should be mild and slowly go away over several days. Talk to your doctor about using tylenol for pain,    When should I call my doctor?  If you have increased:     Pain or swelling    Pus or drainage (clear or slightly yellow drainage is ok)    Temperature over 100F    Spreading redness or warmth around wound    When will I hear about my results?  The biopsy results can take 2-3 weeks to come back. The clinic will call you with the results, send you a [employer] message, or have you schedule a follow-up clinic or phone time to discuss the results. Contact our clinics if you do not hear from us in 3 weeks.     Who should I call with questions?    [facility]: [phone]     [facility]: [phone]    For urgent needs outside of business hours call the [facility] at [phone] and ask for the dermatology resident on call

## 2017-04-04 NOTE — TELEPHONE ENCOUNTER
[facility] Call Center    Phone Message    Name of Caller: LAZARA TREVINO    Phone Number: Home number on file [phone] (home)    Best time to return call: Pt requesting todays summary with Dr Terrazas. Please send record to patients home address.     Action Taken: Message routed to:  Adult Clinics: Dermatology p [id number]

## 2017-04-04 NOTE — MR AVS SNAPSHOT
After Visit Summary   4/4/2017    Nancy Benz    MRN: [id number]           Patient Information     Date Of Birth          8/5/1922        Visit Information        Provider Department      4/4/2017 9:00 AM Sobia Terrazas MD [facility]        Today's Diagnoses     Acne rosacea    -  1    Dermatitis        Neoplasm of uncertain behavior of skin        Other viral warts        Seborrheic keratosis          Care Instructions    Cryotherapy    What is it?    Use of a very cold liquid, such as liquid nitrogen, to freeze and destroy abnormal skin cells that need to be removed    What should I expect?    Tenderness and redness    A small blister that might grow and fill with dark purple blood. There may be crusting.    More than one treatment may be needed if the lesions do not go away.    How do I care for the treated area?    Gently wash the area with your hands when bathing.    Use a thin layer of Vaseline to help with healing. You may use a Band-Aid.     The area should heal within 7-10 days and may leave behind a pink or lighter color.     Do not use an antibiotic or Neosporin ointment.     You may take acetaminophen (Tylenol) for pain.     Call your Doctor if you have:    Severe pain    Signs of infection (warmth, redness, cloudy yellow drainage, and or a bad smell)    Questions or concerns    Who should I call with questions?       [facility]: [phone]       [facility]: [phone]       For urgent needs outside of business hours call the [facility] at [phone]        and ask for the dermatology resident on call    Wound Care After a Biopsy    What is a skin biopsy?  A skin biopsy allows the doctor to examine a very small piece of tissue under the microscope to determine the diagnosis and the best treatment for the skin condition. A local anesthetic (numbing medicine)  is injected with a very small needle  into the skin area to be tested. A small piece of skin is taken from the area. Sometimes a suture (stitch) is used.     What are the risks of a skin biopsy?  I will experience scar, bleeding, swelling, pain, crusting and redness. I may experience incomplete removal or recurrence. Risks of this procedure are excessive bleeding, bruising, infection, nerve damage, numbness, thick (hypertrophic or keloidal) scar and non-diagnostic biopsy.    How should I care for my wound for the first 24 hours?    Keep the wound dry and covered for 24 hours    If it bleeds, hold direct pressure on the area for 15 minutes. If bleeding does not stop then go to the emergency room    Avoid strenuous exercise the first 1-2 days or as your doctor instructs you    How should I care for the wound after 24 hours?    After 24 hours, remove the bandage    You may bathe or shower as normal    If you had a scalp biopsy, you can shampoo as usual and can use shower water to clean the biopsy site daily    Clean the wound twice a day with gentle soap and water    Do not scrub, be gentle    Apply white petroleum/Vaseline after cleaning the wound with a cotton swab or a clean finger, and keep the site covered with a Bandaid /bandage. Bandages are not necessary with a scalp biopsy    If you are unable to cover the site with a Bandaid /bandage, re-apply ointment 2-3 times a day to keep the site moist. Moisture will help with healing    Avoid strenuous activity for first 1-2 days    Avoid lakes, rivers, pools, and oceans until the stitches are removed or the site is healed    How do I clean my wound?    Wash hands for 15 minutes with soap or use hand  before all wound care    Clean the wound with gentle soap and water    Apply white petroleum/Vaseline  to wound after it is clean    Replace the Bandaid /bandage to keep the wound covered for the first few days or as instructed by your doctor    If you had a scalp biopsy, warm shower water to the area  on a daily basis should suffice    What should I use to clean my wound?     Cotton-tipped applicators (Qtips )    White petroleum jelly (Vaseline ). Use a clean new container and use Q-tips to apply.    Bandaids   as needed    Gentle soap     How should I care for my wound long term?    Do not get your wound dirty    Keep up with wound care for one week or until the area is healed.    A small scab will form and fall off by itself when the area is completely healed. The area will be red and will become pink in color as it heals. Sun protection is very important for how your scar will turn out. Sunscreen with an SPF 30 or greater is recommended once the area is healed.    You should have some soreness but it should be mild and slowly go away over several days. Talk to your doctor about using tylenol for pain,    When should I call my doctor?  If you have increased:     Pain or swelling    Pus or drainage (clear or slightly yellow drainage is ok)    Temperature over 100F    Spreading redness or warmth around wound    When will I hear about my results?  The biopsy results can take 2-3 weeks to come back. The clinic will call you with the results, send you a [employer] message, or have you schedule a follow-up clinic or phone time to discuss the results. Contact our clinics if you do not hear from us in 3 weeks.     Who should I call with questions?    [facility]: [phone]     [facility]: [phone]    For urgent needs outside of business hours call the [facility] at [phone] and ask for the dermatology resident on call            Follow-ups after your visit        Your next 10 appointments already scheduled     Apr 10, 2017 10:15 AM CDT   [facility]    [address] [place] [id number] [phone]            Sep 11, 2017  8:40 AM CDT    LAB with LAB FIRST FLOOR [facility])    [address] [place] [id number] [phone]           Patient must bring picture ID.  Patient should be prepared to give a urine specimen  Please do not eat 10-12 hours before your appointment if you are coming in fasting for labs on lipids, cholesterol, or glucose (sugar).  Pregnant women should follow their Care Team instructions. Water with medications is okay. Do not drink coffee or other fluids.   If you have concerns about taking  your medications, please ask at office or if scheduling via [employer], send a message by clicking on Secure Messaging, Message Your Care Team.            Sep 11, 2017  9:00 AM CDT   PHYSICAL with Candy Trujillo MD   [facility])    [address] [place] [address] [id number] [phone]            Sep 27, 2017 10:00 AM CDT   Return Visit with Zayra Sanchez MD   [facility])    [id number] [place] [phone]            Oct 27, 2017  9:00 AM CDT   Return Visit with Sarahi Hernandez MD   [facility])    [address] [place] [address] [phone]              Who to contact     If you have questions or need follow up information about today's clinic visit or your schedule please contact [facility] directly at [phone].  Normal or non-critical lab and imaging results will be communicated to you by MyChart, letter or phone within 4 business days after the clinic has received the results. If you do not hear from us within 7 days, please contact the clinic through MyChart or phone. If you have a critical or abnormal lab result, we will notify you by phone as soon as possible.  Submit refill requests through [employer] or call your pharmacy and they will  forward the refill request to us. Please allow 3 business days for your refill to be completed.          Additional Information About Your Visit        [employer]ash[employer] Information     [employer] gives you secure access to your electronic health record. If you see a primary care provider, you can also send messages to your care team and make appointments. If you have questions, please call your primary care clinic.  If you do not have a primary care provider, please call [phone] and they will assist you.      [employer] is an electronic gateway that provides easy, online access to your medical records. With [employer], you can request a clinic appointment, read your test results, renew a prescription or communicate with your care team.     To access your existing account, please contact your [facility] or call [phone] for assistance.        Care EveryWhere ID     This is your Care EveryWhere ID. This could be used by other organizations to access your [facility] medical records  [id number]         Blood Pressure from Last 3 Encounters:   03/24/17 112/57   03/06/17 112/54   11/29/16 114/61    Weight from Last 3 Encounters:   03/24/17 54.9 kg (121 lb)   03/06/17 55.1 kg (121 lb 8 oz)   11/29/16 56.2 kg (124 lb)              We Performed the Following     BIOPSY SKIN/SUBQ/MUC MEM, SINGLE LESION     DESTRUCT BENIGN LESION, UP TO 14     Surgical pathology exam          Today's Medication Changes          These changes are accurate as of: 4/4/17  1:52 PM.  If you have any questions, ask your nurse or doctor.               Start taking these medicines.        Dose/Directions    hydrocortisone 2.5 % ointment   Used for:  Dermatitis   Started by:  Sobia Terrazas MD        Apply twice daily for 2 weeks.   Quantity:  30 g   Refills:  0         These medicines have changed or have updated prescriptions.        Dose/Directions    polyethylene glycol powder   Commonly known as:  MIRALAX   This may have  changed:    - when to take this  - reasons to take this   Used for:  Chronic constipation        Dose:  1 capful   Take 17 g (1 capful) by mouth daily   Quantity:  510 g   Refills:  11            Where to get your medicines      These medications were sent to [facility] - [place], [place] - [address]   [address], [facility] [address]     Phone:  [phone]     hydrocortisone 2.5 % ointment                Primary Care Provider Office Phone # Fax #    Candy Trujillo [phone]       [facility] [address]  [facility] [address]        Thank you!     Thank you for choosing [facility]  for your care. Our goal is always to provide you with excellent care. Hearing back from our patients is one way we can continue to improve our services. Please take a few minutes to complete the written survey that you may receive in the mail after your visit with us. Thank you!             Your Updated Medication List - Protect others around you: Learn how to safely use, store and throw away your medicines at www.disposemymeds.org.          This list is accurate as of: 4/4/17  1:52 PM.  Always use your most recent med list.                   Brand Name Dispense Instructions for use    aspirin 81 MG tablet      Take 81 mg by mouth daily       bimatoprost 0.01 % Soln    LUMIGAN    3 Bottle    Place 1 drop into both eyes At Bedtime       brimonidine 0.15 % ophthalmic solution    ALPHAGAN-P     Apply 1 drop to eye       brimonidine-timolol 0.2-0.5 % ophthalmic solution    COMBIGAN    10 mL    Place 1 drop into both eyes 2 times daily       CALCIUM 600 + D 600-200 MG-UNIT Tabs   Generic drug:  Calcium Carb-Cholecalciferol      Take 1 tablet by mouth daily Vitamin W=6711 iu       ciclopirox 0.77 % cream    LOPROX    270 g    Apply topically 2 times daily To left big toe twice daily.       cycloSPORINE 0.05 % ophthalmic emulsion    RESTASIS    1 Box    Place 1 drop  into both eyes every 12 hours       diltiazem 120 MG 24 hr capsule    DILACOR XR    90 capsule    Take 1 capsule (120 mg) by mouth daily       ferrous sulfate 325 (65 FE) MG tablet    IRON    30 tablet    Take 325 mg by mouth daily (with breakfast) Reported on 3/6/2017       FLOVENT  MCG/ACT Inhaler   Generic drug:  fluticasone     36 g    INHALE 2 PUFFS INTO THE LUNGS 2 TIMES DAILY       FLUoxetine 20 MG capsule    PROzac    90 capsule    Take 1 capsule (20 mg) by mouth daily       fluticasone 50 MCG/ACT spray    FLONASE    1 Bottle    Spray 2 sprays into both nostrils daily       gabapentin 100 MG capsule    NEURONTIN    180 capsule    1-2 capsule late afternoon       GENTEAL SEVERE 0.3 % Gel ophthalmic gel   Generic drug:  hypromellose      Place 0.1 g (2 drops) into both eyes At Bedtime       hydrocortisone 2.5 % ointment     30 g    Apply twice daily for 2 weeks.       polyethylene glycol powder    MIRALAX    510 g    Take 17 g (1 capful) by mouth daily       pravastatin 40 MG tablet    PRAVACHOL    45 tablet    Take 0.5 tablets (20 mg) by mouth daily       SYSTANE 0.4-0.3 % Soln ophthalmic solution   Generic drug:  polyethylene glycol 0.4%- propylene glycol 0.3%      Place 1 drop into both eyes 3 times daily as needed for dry eyes       VENTOLIN HFA IN      Inhale 2 puffs into the lungs every 6 hours as needed       zolpidem 5 MG tablet    AMBIEN    90 tablet    Take 0.5-1 tablets (2.5-5 mg) by mouth nightly as needed for sleep

## 2017-04-04 NOTE — PROGRESS NOTES
Dermatology Rooming Note    Nancy Benz's goals for this visit include:   Chief Complaint   Patient presents with     RECHECK     Waist up skin exam - spots on right neck       Is a scribe okay for this visit: YES    Are records needed for this visit(If yes, obtain release of information): NO     Vitals: There were no vitals taken for this visit.    Referring Provider:  No referring provider defined for this encounter.    Jacki Barbour, CMA

## 2017-04-04 NOTE — PROGRESS NOTES
"[facility] Dermatology Note      Dermatology Problem List:  1. Actinic keratosis  -s/p cryotherapy  2. Periocular dermatitis, likely from eye drop use  -Current Tx: hydrocortisone 2.5% cream. (initiated 6/28/2016) without improvement, we have switched to ointment  -declines patch testing    Encounter Date: Apr 4, 2017    CC:  Chief Complaint   Patient presents with     RECHECK     Waist up skin exam - spots on right neck         History of Present Illness:  Ms. Nancy Benz is a 89 year old female who presents as follow up for history of actinic keratosis. The patient was last seen 6/28/2016 when 1 AK was treated with cryotherapy and hydrocortisone 2.5% cream was started for periocular dermatitis. Today, the patient reports that she used hydrocortisone ointment around her eyes without improvement. The area is occasionally pruritic. She was seen in opthalmology by Dr. Reyes yesterday who recommended an OTC product. Reports \"growths\" and \"cysts\" on the neck she would like evaluated. Denies rubbing, catching, pruritus or bleeding of the lesions. She is bothered by the appearance of the lesions. The patient reports no other lesions of concern.    The patient is present with her  today.     Past Medical History:   Patient Active Problem List   Diagnosis     Glaucoma     Keratoconjunctivitis sicca, not specified as Sjogren's, left     Cataract     Pseudophakia of both eyes     Presbyopia     Monoclonal gammopathy     Nonrheumatic aortic valve insufficiency     Dizziness     History of vertebral fracture     Nocturnal oxygen desaturation     Urgency incontinence     Hyperlipidemia LDL goal <130     Depression with anxiety     Restless leg syndrome     Mild persistent asthma without complication     ACP (advance care planning)     Chronic constipation     Prediabetes     Simple chronic bronchitis (H)     Senile osteoporosis     Past Medical History:   Diagnosis Date     Arthritis      " Bilateral presbyopia      Cataract 2001/2007     COPD (chronic obstructive pulmonary disease) (H)      Depressive disorder      Monoclonal gammopathy      Nonrheumatic aortic valve insufficiency      Open-angle glaucoma 1969    both eyes     Uncomplicated asthma      Past Surgical History:   Procedure Laterality Date     CATARACT IOL, RT/LT       PHACOEMULSIFICATION CLEAR CORNEA WITH STANDARD INTRAOCULAR LENS IMPLANT Left 7/10/07     PHACOEMULSIFICATION WITH INTRAOCULAR LENS IMPLANT, TRABECULECTOMY, COMBINED Right 4/30/01     REPAIR PTOSIS Bilateral 9/12/2016    Procedure: REPAIR PTOSIS;  Surgeon: Deyvi Lei MD;  Location: [facility] OR     REPAIR PTOSIS BROW Left 9/12/2016    Procedure: REPAIR PTOSIS BROW;  Surgeon: Deyvi Lei MD;  Location: [facility] OR     Social History:  The patient is retired. The patient denies use of tanning beds. The patient has 3-6 alcoholic drinks per week, does not smoke or use tobacco. The patient has three children and great grandchildren.    Family History:  There is no family history of skin cancer.  There is a family history of asthma and hay fever.     Medications:  Current Outpatient Prescriptions   Medication Sig Dispense Refill     FLUoxetine (PROZAC) 20 MG capsule Take 1 capsule (20 mg) by mouth daily 90 capsule 0     brimonidine-timolol (COMBIGAN) 0.2-0.5 % ophthalmic solution Place 1 drop into both eyes 2 times daily 10 mL 1     hypromellose (GENTEAL) ophthalmic gel 0.3% Place 0.1 g (2 drops) into both eyes At Bedtime       FLOVENT  MCG/ACT Inhaler INHALE 2 PUFFS INTO THE LUNGS 2 TIMES DAILY 36 g 1     diltiazem (DILACOR XR) 120 MG 24 hr capsule Take 1 capsule (120 mg) by mouth daily 90 capsule 1     pravastatin (PRAVACHOL) 40 MG tablet Take 0.5 tablets (20 mg) by mouth daily 45 tablet 1     gabapentin (NEURONTIN) 100 MG capsule 1-2 capsule late afternoon 180 capsule 1     zolpidem (AMBIEN) 5 MG tablet Take 0.5-1 tablets (2.5-5 mg) by mouth nightly as needed for sleep  90 tablet 1     cycloSPORINE (RESTASIS) 0.05 % ophthalmic emulsion Place 1 drop into both eyes every 12 hours 1 Box 10     ciclopirox (LOPROX) 0.77 % cream Apply topically 2 times daily To left big toe twice daily. 270 g 2     brimonidine (ALPHAGAN-P) 0.15 % ophthalmic solution Apply 1 drop to eye       ferrous sulfate (IRON) 325 (65 FE) MG tablet Take 325 mg by mouth daily (with breakfast) Reported on 3/6/2017 30 tablet 2     bimatoprost (LUMIGAN) 0.01 % SOLN Place 1 drop into both eyes At Bedtime 3 Bottle 1     fluticasone (FLONASE) 50 MCG/ACT nasal spray Spray 2 sprays into both nostrils daily 1 Bottle 0     polyethylene glycol (MIRALAX) powder Take 17 g (1 capful) by mouth daily (Patient taking differently: Take 1 capful by mouth daily as needed ) 510 g 11     polyethylene glycol 0.4%- propylene glycol 0.3% (SYSTANE) 0.4-0.3 % SOLN ophthalmic solution Place 1 drop into both eyes 3 times daily as needed for dry eyes       aspirin 81 MG tablet Take 81 mg by mouth daily       Calcium Carb-Cholecalciferol (CALCIUM 600 + D) 600-200 MG-UNIT TABS Take 1 tablet by mouth daily Vitamin G=9252 iu       Albuterol Sulfate (VENTOLIN HFA IN) Inhale 2 puffs into the lungs every 6 hours as needed        Allergies   Allergen Reactions     Hydrocodone-Acetaminophen Other (See Comments)     Nausea, dizzy, felt awful     Pilocarpine      Timolol Difficulty breathing     Makes asthma worse    Oral tabs.. ophth sol does not cause a problem     Review of Systems:  -Skin: As above in HPI. No additional skin concerns.  -Const: The patient is generally feeling well today.     Physical exam:  Vitals: There were no vitals taken for this visit.  GEN: This is a well developed, well-nourished female in no acute distress, in a pleasant mood.    SKIN: Waist-up skin, which includes the head/face, neck, both arms, chest, back, abdomen, digits and/or nails was examined.  -There is verrucous papule with thrombosed capillaries interrupting  dermatoglyphics and erythema on the right neck.  -There are waxy stuck on tan to brown papules on the neck and trunk.  -Stuck on 4mm papule on the mid back.   -mild periorbital erythema  -No other lesions of concern on areas examined.     Impression/Plan:  1. History of actinic keratosis, no clinical evidence of recurrence.   Last total skin exam 6/28/2016  2. Dermatitis, favor irritant dermatitis. Consider contact dermatitis due to eye drop use  Start hydrocortisone 2.5% ointment. Apply twice daily for two weeks.   Declines patch testing   3. Seborrheic keratosis, neck and trunk    No further intervention required at this time.   4. Verruca vulgaris, right neck. Inflamed    Cryotherapy procedure note: After verbal consent and discussion of risks and benefits including but no limited to dyspigmentation/scar, blister, and pain, 1 was treated with 1-2mm freeze border for 2 cycles with liquid nitrogen. Post cryotherapy instructions were provided.   5. Stuck on 4mm papule, mid back. Neoplasm of uncertain behavior. Differential diagnosis includes SK rule out other    Shave biopsy:  After discussion of benefits and risks including but not limited to bleeding/bruising, pain/swelling, infection, scar, incomplete removal, nerve damage/numbness, recurrence, and non-diagnostic biopsy, written consent, verbal consent and photographs were obtained. Time-out was performed. The area was cleaned with isopropyl alcohol. 0.5 mL of 1% lidocaine with epinephrine was injected to obtain adequate anesthesia of the lesion on the mid back. A shave biopsy was performed. Hemostasis was achieved with aluminium chloride. Vaseline and a sterile dressing were applied. The patient tolerated the procedure and no complications were noted. The patient was provided with verbal and written post care instructions.     Follow-up in 6 months, earlier for new or changing lesions.    Staff Involved:  Scribe/Staff    Scribe Disclosure:   Princess HERR am  serving as a scribe to document services personally performed by Dr. Sobia Terrazas, based on data collection and the provider's statements to me.     Provider Disclosure:   I agree with above History, Review of Systems, Physical exam and Plan. I have reviewed the content of the documentation and have edited it as needed. I have personally performed the services documented here and the documentation accurately represents those services and the decisions I have made.     Sobia Terrazas MD    Department of Dermatology  [facility]: Phone: [phone], Fax:[phone]  [facility]: Phone: [phone], Fax: [phone]

## 2017-04-06 LAB — COPATH REPORT: NORMAL

## 2017-04-10 ENCOUNTER — OFFICE VISIT (OUTPATIENT)
Dept: OPHTHALMOLOGY | Facility: CLINIC | Age: 82
End: 2017-04-10
Payer: COMMERCIAL

## 2017-04-10 DIAGNOSIS — Z98.42 HISTORY OF YAG LASER CAPSULOTOMY OF LENS OF LEFT EYE: Primary | ICD-10-CM

## 2017-04-10 PROCEDURE — 99024 POSTOP FOLLOW-UP VISIT: CPT | Performed by: OPHTHALMOLOGY

## 2017-04-10 ASSESSMENT — REFRACTION_MANIFEST
OS_AXIS: 005
OS_CYLINDER: +1.50
OS_ADD: +3.00
OS_SPHERE: -1.00

## 2017-04-10 ASSESSMENT — VISUAL ACUITY
OS_CC: 20/40
OD_CC: CF
METHOD: SNELLEN - LINEAR
CORRECTION_TYPE: GLASSES

## 2017-04-10 ASSESSMENT — EXTERNAL EXAM - LEFT EYE: OS_EXAM: S/P PTOSIS REPAIR

## 2017-04-10 ASSESSMENT — REFRACTION_WEARINGRX
OS_CYLINDER: +1.00
OD_SPHERE: -1.25
OD_ADD: +3.00
OD_CYLINDER: +1.00
OS_ADD: +3.00
OS_AXIS: 179
SPECS_TYPE: TRIFOCAL
OD_AXIS: 025
OS_SPHERE: -1.00

## 2017-04-10 ASSESSMENT — TONOMETRY
OS_IOP_MMHG: 16
IOP_METHOD: TONOPEN
OD_IOP_MMHG: 18

## 2017-04-10 ASSESSMENT — SLIT LAMP EXAM - LIDS
COMMENTS: NORMAL
COMMENTS: NORMAL

## 2017-04-10 ASSESSMENT — EXTERNAL EXAM - RIGHT EYE: OD_EXAM: S/P PTOSIS REPAIR

## 2017-04-10 NOTE — MR AVS SNAPSHOT
After Visit Summary   4/10/2017    Nancy Benz    MRN: [id number]           Patient Information     Date Of Birth          8/5/1922        Visit Information        Provider Department      4/10/2017 10:15 AM Lee Reyes MD [facility]        Today's Diagnoses     History of YAG laser capsulotomy of lens of left eye    -  1      Care Instructions    Stop the prednisolone  Continue using lumigan and combigan as directed  Continue using artificial tears daily        Follow-ups after your visit        Your next 10 appointments already scheduled     Sep 11, 2017  8:40 AM CDT   LAB with LAB FIRST FLOOR [facility])    [address] [address] [phone]           Patient must bring picture ID.  Patient should be prepared to give a urine specimen  Please do not eat 10-12 hours before your appointment if you are coming in fasting for labs on lipids, cholesterol, or glucose (sugar).  Pregnant women should follow their Care Team instructions. Water with medications is okay. Do not drink coffee or other fluids.   If you have concerns about taking  your medications, please ask at office or if scheduling via [employer], send a message by clicking on Secure Messaging, Message Your Care Team.            Sep 11, 2017  9:00 AM CDT   PHYSICAL with Candy Trujillo MD   [facility])    [address] [facility] [address]   [phone]            Sep 27, 2017 10:00 AM CDT   Return Visit with Zayra Sanchez MD   [facility])    [address] [facility] [address]   [phone]            Oct 27, 2017  9:00 AM CDT   Return Visit with Sarahi Hernandez MD   [facility])    [address] [facility] [address]   [phone]               Who to contact     If you have questions or need follow up information about today's clinic visit or your schedule please contact [facility] directly at [phone].  Normal or non-critical lab and imaging results will be communicated to you by Pinshapehart, letter or phone within 4 business days after the clinic has received the results. If you do not hear from us within 7 days, please contact the clinic through Pinshapehart or phone. If you have a critical or abnormal lab result, we will notify you by phone as soon as possible.  Submit refill requests through Stypi or call your pharmacy and they will forward the refill request to us. Please allow 3 business days for your refill to be completed.          Additional Information About Your Visit        Stypi Information     Stypi gives you secure access to your electronic health record. If you see a primary care provider, you can also send messages to your care team and make appointments. If you have questions, please call your primary care clinic.  If you do not have a primary care provider, please call [phone] and they will assist you.      Stypi is an electronic gateway that provides easy, online access to your medical records. With Stypi, you can request a clinic appointment, read your test results, renew a prescription or communicate with your care team.     To access your existing account, please contact your [facility] or call [phone] for assistance.        Care EveryWhere ID     This is your Care EveryWhere ID. This could be used by other organizations to access your [place] medical records  [id number]         Blood Pressure from Last 3 Encounters:   03/24/17 112/57   03/06/17 112/54   11/29/16 114/61    Weight from Last 3 Encounters:   03/24/17 54.9 kg (121 lb)   03/06/17 55.1 kg (121 lb 8 oz)   11/29/16 56.2 kg (124 lb)              We Performed the Following     POST-OP FOLLOW-UP VISIT           Today's Medication Changes          These changes are accurate as of: 4/10/17  1:00 PM.  If you have any questions, ask your nurse or doctor.               These medicines have changed or have updated prescriptions.        Dose/Directions    polyethylene glycol powder   Commonly known as:  MIRALAX   This may have changed:    - when to take this  - reasons to take this   Used for:  Chronic constipation        Dose:  1 capful   Take 17 g (1 capful) by mouth daily   Quantity:  510 g   Refills:  11                Primary Care Provider Office Phone # Fax #    Candy Trujillo [phone]       [facility] [address]  [facility] [address]        Thank you!     Thank you for choosing [facility]  for your care. Our goal is always to provide you with excellent care. Hearing back from our patients is one way we can continue to improve our services. Please take a few minutes to complete the written survey that you may receive in the mail after your visit with us. Thank you!             Your Updated Medication List - Protect others around you: Learn how to safely use, store and throw away your medicines at www.disposemymeds.org.          This list is accurate as of: 4/10/17  1:00 PM.  Always use your most recent med list.                   Brand Name Dispense Instructions for use    aspirin 81 MG tablet      Take 81 mg by mouth daily       bimatoprost 0.01 % Soln    LUMIGAN    3 Bottle    Place 1 drop into both eyes At Bedtime       brimonidine 0.15 % ophthalmic solution    ALPHAGAN-P     Apply 1 drop to eye       brimonidine-timolol 0.2-0.5 % ophthalmic solution    COMBIGAN    10 mL    Place 1 drop into both eyes 2 times daily       CALCIUM 600 + D 600-200 MG-UNIT Tabs   Generic drug:  Calcium Carb-Cholecalciferol      Take 1 tablet by mouth daily Vitamin Q=0766 iu       ciclopirox 0.77 % cream    LOPROX    270 g    Apply topically 2 times daily To left big toe twice daily.        cycloSPORINE 0.05 % ophthalmic emulsion    RESTASIS    1 Box    Place 1 drop into both eyes every 12 hours       diltiazem 120 MG 24 hr capsule    DILACOR XR    90 capsule    Take 1 capsule (120 mg) by mouth daily       ferrous sulfate 325 (65 FE) MG tablet    IRON    30 tablet    Take 325 mg by mouth daily (with breakfast) Reported on 3/6/2017       FLOVENT  MCG/ACT Inhaler   Generic drug:  fluticasone     36 g    INHALE 2 PUFFS INTO THE LUNGS 2 TIMES DAILY       FLUoxetine 20 MG capsule    PROzac    90 capsule    Take 1 capsule (20 mg) by mouth daily       fluticasone 50 MCG/ACT spray    FLONASE    1 Bottle    Spray 2 sprays into both nostrils daily       gabapentin 100 MG capsule    NEURONTIN    180 capsule    1-2 capsule late afternoon       GENTEAL SEVERE 0.3 % Gel ophthalmic gel   Generic drug:  hypromellose      Place 0.1 g (2 drops) into both eyes At Bedtime       hydrocortisone 2.5 % ointment     30 g    Apply twice daily for 2 weeks.       polyethylene glycol powder    MIRALAX    510 g    Take 17 g (1 capful) by mouth daily       pravastatin 40 MG tablet    PRAVACHOL    45 tablet    Take 0.5 tablets (20 mg) by mouth daily       SYSTANE 0.4-0.3 % Soln ophthalmic solution   Generic drug:  polyethylene glycol 0.4%- propylene glycol 0.3%      Place 1 drop into both eyes 3 times daily as needed for dry eyes       VENTOLIN HFA IN      Inhale 2 puffs into the lungs every 6 hours as needed       zolpidem 5 MG tablet    AMBIEN    90 tablet    Take 0.5-1 tablets (2.5-5 mg) by mouth nightly as needed for sleep

## 2017-04-10 NOTE — PATIENT INSTRUCTIONS
Stop the prednisolone  Continue using lumigan and combigan as directed  Continue using artificial tears daily

## 2017-04-10 NOTE — NURSING NOTE
Patient presents with:  Post-op Yag Capsulotomy      Referring Provider:  No referring provider defined for this encounter.    HPI    Symptoms:              Comments:  Some times VA is a lot better and then it goes away. Pt taking PF QID OS.

## 2017-04-10 NOTE — PROGRESS NOTES
Assessment & Plan   Nancy Benz is a 89 year old female who presents with:   Review of systems for the eyes was negative other than the pertinent positives and negatives noted in the HPI.  History is obtained from the patient and spouse.    History of YAG laser capsulotomy of lens of left eye  Good post op results    Return in 6 mo for glaucoma eval    Documentation for today's encounter was performed by Janeth WU  Acting as a scribe in my presence. I have reviewed and verified that it is an accurate recording of today's encounter.    Attending Physician Attestation:  Complete documentation of historical and exam elements from today's encounter can be found in the full encounter summary report (not reduplicated in this progress note).  I personally obtained the chief complaint(s) and history of present illness.  I confirmed and edited as necessary the review of systems, past medical/surgical history, family history, social history, and examination findings as documented by others; and I examined the patient myself.  I personally reviewed the relevant tests, images, and reports as documented above.  I formulated and edited as necessary the assessment and plan and discussed the findings and management plan with the patient and family. - Lee Reyes MD

## 2017-04-24 ENCOUNTER — TELEPHONE (OUTPATIENT)
Dept: PEDIATRICS | Facility: CLINIC | Age: 82
End: 2017-04-24

## 2017-04-24 ENCOUNTER — TELEPHONE (OUTPATIENT)
Dept: NURSING | Facility: CLINIC | Age: 82
End: 2017-04-24

## 2017-04-24 NOTE — TELEPHONE ENCOUNTER
"Call Type: Triage Call    Presenting Problem: Patient and daughter Melisa calling with  medication question regarding Mucinex DM(OTC) and her mom's  medications. \"Is there a drug interaction? The pharmacy and [employer]  said to check with her MD.\" I referred them back to PCP.  Triage Note:  Guideline Title: Medication Questions - Adult  Recommended Disposition: Speak with Provider or Pharmacist  within 24 hours  Original Inclination: Wanted to speak with a nurse  Override Disposition:  Intended Action: Call PCP/HCP  Physician Contacted: No  Has questions about prescribed and/or nonprescribed medications not covered by  available resources ?  YES  Pregnant and has medication questions regarding prescribed and/or nonprescribed  medication(s) not covered by available resources ? NO  Breastfeeding and has medication questions regarding prescribed and/or  nonprescribed medication(s) not covered by available resources ? NO  Sign(s) or symptom(s) associated with a diagnosed condition or with a new illness  ? NO  Prescription ordered today and not available at pharmacy putting patient at  clinical risk ? NO  Recurrence of a symptom(s) or illness post prescribed medication treatment AND  provider instructed patient to call if symptom(s) returned. ? NO  Unable to obtain prescribed medication related to available resources AND  situation poses immediate clinical risk ? NO  Pharmacy calling to clarify prescription order. ? NO  Requests refill of prescribed medication that does NOT have a valid refill; lack  of medication may cause clinical risk to patient if not available. ? NO  Requests refill of prescribed medication without valid refills OR requests refill  of prescribed medication with valid refills but does not have prescription number  (no RX container); lack of medication does not put patient at clinical risk ? NO  Physician Instructions:  Care Advice:  "

## 2017-04-24 NOTE — TELEPHONE ENCOUNTER
[facility] Call Center    Phone Message    Name of Caller: Preston    Phone Number: Other phone number:  [phone]    Best time to return call: Any    May a detailed message be left on voicemail: yes    Relation to patient: Daughter     Reason for Call: Medication Question or concern regarding medication   Prescription Clarification:   Name of Medication: FLUoxetine (PROZAC) 20 MG capsule [81003] (Order [id number])   Patient is wanting to take Mucinex DM and would like to know if any of her medication FLUoxetine (PROZAC) 20 MG capsule [77173] (Order [id number])   Would interfere with the Mucinex. Please call to advise. Patient had a bad cold and would like to take the Muncinex DM to help relieve her symptoms.    Prescribing Provider: Dr. Trujillo     Action Taken: Message routed to:  Primary Care p [id number]

## 2017-04-24 NOTE — TELEPHONE ENCOUNTER
Pharmacy contacted patient's daughter to review question.  It would be okay for patient to take Mucinex Dm while taking Prozac.

## 2017-04-25 NOTE — TELEPHONE ENCOUNTER
Pt. Would like a call back from a nurse regarding a medication question. Pt. Would like to know if she can take Mucinex DM with Prozac.    Routing to pool.    Oxana Alcala, [facility]

## 2017-04-25 NOTE — TELEPHONE ENCOUNTER
Pt. Would like a call back from a nurse regarding a medication question. Pt. Would like to know if she can take Mucinex DM with Prozac.     Routing to pool.     Oxana Alcala, [facility]

## 2017-05-01 ENCOUNTER — TRANSFERRED RECORDS (OUTPATIENT)
Dept: HEALTH INFORMATION MANAGEMENT | Facility: CLINIC | Age: 82
End: 2017-05-01

## 2017-05-04 ENCOUNTER — MYC MEDICAL ADVICE (OUTPATIENT)
Dept: PEDIATRICS | Facility: CLINIC | Age: 82
End: 2017-05-04

## 2017-05-04 ENCOUNTER — TELEPHONE (OUTPATIENT)
Dept: PEDIATRICS | Facility: CLINIC | Age: 82
End: 2017-05-04

## 2017-05-04 NOTE — TELEPHONE ENCOUNTER
Verbal order given for home care nurse to Chelsey from [facility].    Katie Prater RN,   [facility], [facility]

## 2017-05-04 NOTE — TELEPHONE ENCOUNTER
[facility] Call Center    Phone Message    Name of Caller: Chelsey from [facility]     Phone Number: Other phone number: [phone]    Best time to return call: anytime    May a detailed message be left on voicemail: yes    Reason for Call: Other: Chelsey from [facility] is calling  form home care orders. Please give a call back to discuss     Action Taken: Message routed to:  Primary Care p [phone]

## 2017-05-08 ENCOUNTER — OFFICE VISIT (OUTPATIENT)
Dept: PEDIATRICS | Facility: CLINIC | Age: 82
End: 2017-05-08
Payer: COMMERCIAL

## 2017-05-08 ENCOUNTER — TELEPHONE (OUTPATIENT)
Dept: PEDIATRICS | Facility: CLINIC | Age: 82
End: 2017-05-08

## 2017-05-08 VITALS
OXYGEN SATURATION: 97 % | HEART RATE: 55 BPM | DIASTOLIC BLOOD PRESSURE: 72 MMHG | WEIGHT: 118.9 LBS | TEMPERATURE: 96.5 F | BODY MASS INDEX: 23.22 KG/M2 | SYSTOLIC BLOOD PRESSURE: 112 MMHG

## 2017-05-08 DIAGNOSIS — F41.8 DEPRESSION WITH ANXIETY: ICD-10-CM

## 2017-05-08 DIAGNOSIS — G25.81 RESTLESS LEG SYNDROME: ICD-10-CM

## 2017-05-08 DIAGNOSIS — Z09 HOSPITAL DISCHARGE FOLLOW-UP: Primary | ICD-10-CM

## 2017-05-08 DIAGNOSIS — E87.1 HYPONATREMIA: ICD-10-CM

## 2017-05-08 DIAGNOSIS — J10.1 INFLUENZA DUE TO INFLUENZA VIRUS, TYPE B: ICD-10-CM

## 2017-05-08 DIAGNOSIS — J45.901 ASTHMA EXACERBATION: ICD-10-CM

## 2017-05-08 LAB
ALBUMIN SERPL-MCNC: 3.2 G/DL (ref 3.4–5)
ALP SERPL-CCNC: 51 U/L (ref 40–150)
ALT SERPL W P-5'-P-CCNC: 28 U/L (ref 0–50)
ANION GAP SERPL CALCULATED.3IONS-SCNC: 6 MMOL/L (ref 3–14)
AST SERPL W P-5'-P-CCNC: 16 U/L (ref 0–45)
BILIRUB SERPL-MCNC: 0.4 MG/DL (ref 0.2–1.3)
BUN SERPL-MCNC: 17 MG/DL (ref 7–30)
CALCIUM SERPL-MCNC: 9.3 MG/DL (ref 8.5–10.1)
CHLORIDE SERPL-SCNC: 93 MMOL/L (ref 94–109)
CO2 SERPL-SCNC: 33 MMOL/L (ref 20–32)
CREAT SERPL-MCNC: 0.7 MG/DL (ref 0.52–1.04)
GFR SERPL CREATININE-BSD FRML MDRD: 78 ML/MIN/1.7M2
GLUCOSE SERPL-MCNC: 98 MG/DL (ref 70–99)
POTASSIUM SERPL-SCNC: 4.7 MMOL/L (ref 3.4–5.3)
PROT SERPL-MCNC: 6.7 G/DL (ref 6.8–8.8)
SODIUM SERPL-SCNC: 132 MMOL/L (ref 133–144)

## 2017-05-08 PROCEDURE — 99214 OFFICE O/P EST MOD 30 MIN: CPT | Performed by: INTERNAL MEDICINE

## 2017-05-08 PROCEDURE — 36415 COLL VENOUS BLD VENIPUNCTURE: CPT | Performed by: INTERNAL MEDICINE

## 2017-05-08 PROCEDURE — 80053 COMPREHEN METABOLIC PANEL: CPT | Performed by: INTERNAL MEDICINE

## 2017-05-08 RX ORDER — ALBUTEROL SULFATE 90 UG/1
2 AEROSOL, METERED RESPIRATORY (INHALATION) EVERY 6 HOURS PRN
Qty: 1 INHALER | Refills: 5 | Status: SHIPPED | OUTPATIENT
Start: 2017-05-08 | End: 2018-01-18

## 2017-05-08 RX ORDER — GABAPENTIN 100 MG/1
200 CAPSULE ORAL DAILY
Qty: 180 CAPSULE | Refills: 3 | Status: SHIPPED | OUTPATIENT
Start: 2017-05-08 | End: 2018-07-16

## 2017-05-08 RX ORDER — ALBUTEROL SULFATE 90 UG/1
2 AEROSOL, METERED RESPIRATORY (INHALATION) EVERY 6 HOURS PRN
Qty: 1 INHALER | Refills: 5 | Status: SHIPPED | OUTPATIENT
Start: 2017-05-08 | End: 2017-05-08

## 2017-05-08 NOTE — NURSING NOTE
Chief Complaint   Patient presents with     RECHECK     hospital follow up       Initial /72 (Cuff Size: Adult Regular)  Pulse 55  Temp 96.5  F (35.8  C) (Temporal)  Wt 118 lb 14.4 oz (53.9 kg)  SpO2 97%  BMI 23.22 kg/m2 Estimated body mass index is 23.22 kg/(m^2) as calculated from the following:    Height as of 3/24/17: 5' (1.524 m).    Weight as of this encounter: 118 lb 14.4 oz (53.9 kg).  Medication Reconciliation: complete

## 2017-05-08 NOTE — PATIENT INSTRUCTIONS
Make appointment(s) for:   -- clinic follow up in one month.   -- get lab done today.       Additional instructions:  Stop furosemide      Medication(s) prescribed today:    Orders Placed This Encounter   Medications     albuterol (PROAIR HFA/PROVENTIL HFA/VENTOLIN HFA) 108 (90 BASE) MCG/ACT Inhaler     Sig: Inhale 2 puffs into the lungs every 6 hours as needed for shortness of breath / dyspnea or wheezing     Dispense:  1 Inhaler     Refill:  5

## 2017-05-08 NOTE — MR AVS SNAPSHOT
After Visit Summary   5/8/2017    Nancy Benz    MRN: [id number]           Patient Information     Date Of Birth          8/5/1922        Visit Information        Provider Department      5/8/2017 2:20 PM Candy Trujillo MD [facility]        Today's Diagnoses     Hospital discharge follow-up    -  1    Influenza due to influenza virus, type B        Asthma exacerbation        Hyponatremia        Restless leg syndrome        Depression with anxiety          Care Instructions    Make appointment(s) for:   -- clinic follow up in one month.   -- get lab done today.       Additional instructions:  Stop furosemide      Medication(s) prescribed today:    Orders Placed This Encounter   Medications     albuterol (PROAIR HFA/PROVENTIL HFA/VENTOLIN HFA) 108 (90 BASE) MCG/ACT Inhaler     Sig: Inhale 2 puffs into the lungs every 6 hours as needed for shortness of breath / dyspnea or wheezing     Dispense:  1 Inhaler     Refill:  5               Follow-ups after your visit        Your next 10 appointments already scheduled     Sep 11, 2017  8:40 AM CDT   LAB with LAB FIRST FLOOR [facility])    [address] [place] [address] [phone]           Patient must bring picture ID.  Patient should be prepared to give a urine specimen  Please do not eat 10-12 hours before your appointment if you are coming in fasting for labs on lipids, cholesterol, or glucose (sugar).  Pregnant women should follow their Care Team instructions. Water with medications is okay. Do not drink coffee or other fluids.   If you have concerns about taking  your medications, please ask at office or if scheduling via [facility], send a message by clicking on Secure Messaging, Message Your Care Team.            Sep 11, 2017  9:00 AM CDT   PHYSICAL with Candy Trujillo MD   [facility])    [address] [place] [address]  [address]   [phone]            Sep 27, 2017 10:00 AM CDT   Return Visit with Zayra Sanhcez MD   [facility]    [address] [place] [address] [phone]            Oct 27, 2017  9:00 AM CDT   Return Visit with Sarahi Hernandez MD   [facility]    [address] [place] [id number] [phone]              Who to contact     If you have questions or need follow up information about today's clinic visit or your schedule please contact [facility] directly at [phone].  Normal or non-critical lab and imaging results will be communicated to you by GLO Sciencehart, letter or phone within 4 business days after the clinic has received the results. If you do not hear from us within 7 days, please contact the clinic through GLO Science[employer] or phone. If you have a critical or abnormal lab result, we will notify you by phone as soon as possible.  Submit refill requests through [employer] or call your pharmacy and they will forward the refill request to us. Please allow 3 business days for your refill to be completed.          Additional Information About Your Visit        Amindt Information     [employer] gives you secure access to your electronic health record. If you see a primary care provider, you can also send messages to your care team and make appointments. If you have questions, please call your primary care clinic.  If you do not have a primary care provider, please call [phone] and they will assist you.      [employer] is an electronic gateway that provides easy, online access to your medical records. With [employer], you can request a clinic appointment, read your test results, renew a prescription or communicate with your care team.     To access your existing account, please contact your [facility] or call [phone] for  assistance.        Care EveryWhere ID     This is your Care EveryWhere ID. This could be used by other organizations to access your [place] medical records  [id number]        Your Vitals Were     Pulse Temperature Pulse Oximetry BMI (Body Mass Index)          55 96.5  F (35.8  C) (Temporal) 97% 23.22 kg/m2         Blood Pressure from Last 3 Encounters:   05/08/17 112/72   03/24/17 112/57   03/06/17 112/54    Weight from Last 3 Encounters:   05/08/17 118 lb 14.4 oz (53.9 kg)   03/24/17 121 lb (54.9 kg)   03/06/17 121 lb 8 oz (55.1 kg)              We Performed the Following     Comprehensive metabolic panel (BMP + Alb, Alk Phos, ALT, AST, Total. Bili, TP)          Today's Medication Changes          These changes are accurate as of: 5/8/17  3:19 PM.  If you have any questions, ask your nurse or doctor.               Start taking these medicines.        Dose/Directions    albuterol 108 (90 BASE) MCG/ACT Inhaler   Commonly known as:  PROAIR HFA/PROVENTIL HFA/VENTOLIN HFA   Used for:  Asthma exacerbation, Influenza due to influenza virus, type B, Hospital discharge follow-up   Started by:  Candy Trujillo MD        Dose:  2 puff   Inhale 2 puffs into the lungs every 6 hours as needed for shortness of breath / dyspnea or wheezing   Quantity:  1 Inhaler   Refills:  5         These medicines have changed or have updated prescriptions.        Dose/Directions    gabapentin 100 MG capsule   Commonly known as:  NEURONTIN   This may have changed:    - how much to take  - how to take this  - when to take this  - additional instructions   Used for:  Restless leg syndrome   Changed by:  Candy Trujillo MD        Dose:  200 mg   Take 2 capsules (200 mg) by mouth daily (late afternoon)   Quantity:  180 capsule   Refills:  3       polyethylene glycol powder   Commonly known as:  MIRALAX   This may have changed:    - when to take this  - reasons to take this   Used for:  Chronic constipation        Dose:  1 capful   Take 17 g (1 capful) by  mouth daily   Quantity:  510 g   Refills:  11            Where to get your medicines      These medications were sent to [facility] [place], [place] - [address]  [address], [facility] [address]     Phone:  [phone]     albuterol 108 (90 BASE) MCG/ACT Inhaler         These medications were sent to [facility] - [facility] [address]  [address], [facility] [address]     Phone:  [phone]     FLUoxetine 20 MG capsule    gabapentin 100 MG capsule                Primary Care Provider Office Phone # Fax #    Candy Trujillo [phone]       [facility] [address]  [facility] [address]        Thank you!     Thank you for choosing [facility]  for your care. Our goal is always to provide you with excellent care. Hearing back from our patients is one way we can continue to improve our services. Please take a few minutes to complete the written survey that you may receive in the mail after your visit with us. Thank you!             Your Updated Medication List - Protect others around you: Learn how to safely use, store and throw away your medicines at www.disposemymeds.org.          This list is accurate as of: 5/8/17  3:19 PM.  Always use your most recent med list.                   Brand Name Dispense Instructions for use    albuterol 108 (90 BASE) MCG/ACT Inhaler    PROAIR HFA/PROVENTIL HFA/VENTOLIN HFA    1 Inhaler    Inhale 2 puffs into the lungs every 6 hours as needed for shortness of breath / dyspnea or wheezing       aspirin 81 MG tablet      Take 81 mg by mouth daily       brimonidine-timolol 0.2-0.5 % ophthalmic solution    COMBIGAN    10 mL    Place 1 drop into both eyes 2 times daily       CALCIUM 600 + D 600-200 MG-UNIT Tabs   Generic drug:  Calcium Carb-Cholecalciferol      Take 1 tablet by mouth daily Vitamin Z=5153 iu       ciclopirox 0.77 % cream    LOPROX     270 g    Apply topically 2 times daily To left big toe twice daily.       cycloSPORINE 0.05 % ophthalmic emulsion    RESTASIS    1 Box    Place 1 drop into both eyes every 12 hours       diltiazem 120 MG 24 hr capsule    DILACOR XR    90 capsule    Take 1 capsule (120 mg) by mouth daily       FLOVENT  MCG/ACT Inhaler   Generic drug:  fluticasone     36 g    INHALE 2 PUFFS INTO THE LUNGS 2 TIMES DAILY       FLUoxetine 20 MG capsule    PROzac    90 capsule    Take 1 capsule (20 mg) by mouth daily       fluticasone 50 MCG/ACT spray    FLONASE    1 Bottle    Spray 2 sprays into both nostrils daily       gabapentin 100 MG capsule    NEURONTIN    180 capsule    Take 2 capsules (200 mg) by mouth daily (late afternoon)       GENTEAL SEVERE 0.3 % Gel ophthalmic gel   Generic drug:  hypromellose      Place 0.1 g (2 drops) into both eyes At Bedtime       hydrocortisone 2.5 % ointment     30 g    Apply twice daily for 2 weeks.       polyethylene glycol powder    MIRALAX    510 g    Take 17 g (1 capful) by mouth daily       pravastatin 40 MG tablet    PRAVACHOL    45 tablet    Take 0.5 tablets (20 mg) by mouth daily       SYSTANE 0.4-0.3 % Soln ophthalmic solution   Generic drug:  polyethylene glycol 0.4%- propylene glycol 0.3%      Place 1 drop into both eyes 3 times daily as needed for dry eyes       zolpidem 5 MG tablet    AMBIEN    90 tablet    Take 0.5-1 tablets (2.5-5 mg) by mouth nightly as needed for sleep

## 2017-05-08 NOTE — PROGRESS NOTES
SUBJECTIVE:                                                    Nancy Benz is a 89 year old female who presents to clinic today for the following health issues:    Patient was hospitalized on April 30 for acute on chronic respiratory failure due to influenza B. After she was discharged from the hospital, she had an acute 6 pound weight gain and return back to the hospital on May 2. She was diuresed. Her workup did not reveal congestive heart failure. Her echocardiogram showed normal EF.    Since discharge patient has been feeling better. She continues to be quite fatigued. However she notices a spurt of energy about once a day in the last couple of days. Her appetite has not been the greatest but is improving.      Hospital Follow-up Visit:    Hospital/Nursing Home/IP Rehab Facility: [facility]  Date of Admission:unsure  Date of Discharge: unsure  Reason(s) for Admission: flu            Problems taking medications regularly:  None       Medication changes since discharge: None       Problems adhering to non-medication therapy:  None    Summary of hospitalization:  CareEverywhere information obtained and reviewed  Diagnostic Tests/Treatments reviewed.  Follow up needed: none  Other Healthcare Providers Involved in Patient s Care:         None  Update since discharge: improved.     Post Discharge Medication Reconciliation: discharge medications reconciled, continue medications without change.  Plan of care communicated with patient and .     Coding guidelines for this visit:  Type of Medical   Decision Making Face-to-Face Visit       within 7 Days of discharge Face-to-Face Visit        within 14 days of discharge   Moderate Complexity [id number]   High Complexity [id number]                 Problem list, Medication list, Allergies, and Medical/Social/Surgical histories reviewed in [facility] and updated as appropriate.    OBJECTIVE:                                                    /72 (Cuff  Size: Adult Regular)  Pulse 55  Temp 96.5  F (35.8  C) (Temporal)  Wt 118 lb 14.4 oz (53.9 kg)  SpO2 97%  BMI 23.22 kg/m2    GEN: healthy, alert and no distress  HEENT: unremarkable.  Neck:     supple, no thyromegaly, no cervical lymphadenopathy.   CONCHIS:  CTA B/L, no wheezing or crackles.  CV:  RRR no murmur.  Intact distal pulses, good cap refill.  Abd: soft, normal active bowel sounds, non tender, non distended. No mass or organomegaly.   Ext:  no cyanosis, clubbing or edema.         Diagnostic test results:  No results found for this or any previous visit (from the past 24 hour(s)).       ASSESSMENT/PLAN:                                                      89 year old female with the following diagnoses and treatment plan:      ICD-10-CM    1. Hospital discharge follow-up Z09 albuterol (PROAIR HFA/PROVENTIL HFA/VENTOLIN HFA) 108 (90 BASE) MCG/ACT Inhaler     DISCONTINUED: albuterol (PROAIR HFA/PROVENTIL HFA/VENTOLIN HFA) 108 (90 BASE) MCG/ACT Inhaler   2. Influenza due to influenza virus, type B J10.1 albuterol (PROAIR HFA/PROVENTIL HFA/VENTOLIN HFA) 108 (90 BASE) MCG/ACT Inhaler     DISCONTINUED: albuterol (PROAIR HFA/PROVENTIL HFA/VENTOLIN HFA) 108 (90 BASE) MCG/ACT Inhaler   3. Asthma exacerbation J45.901 albuterol (PROAIR HFA/PROVENTIL HFA/VENTOLIN HFA) 108 (90 BASE) MCG/ACT Inhaler     DISCONTINUED: albuterol (PROAIR HFA/PROVENTIL HFA/VENTOLIN HFA) 108 (90 BASE) MCG/ACT Inhaler   4. Hyponatremia E87.1 Comprehensive metabolic panel (BMP + Alb, Alk Phos, ALT, AST, Total. Bili, TP)   5. Restless leg syndrome G25.81 gabapentin (NEURONTIN) 100 MG capsule   6. Depression with anxiety F41.8 FLUoxetine (PROZAC) 20 MG capsule       -- Patient is clinically improving from a respiratory standpoint. Her energy level and appetite are not back to her baseline. With her age, recovery will be a little slower than normal. We will recheck her labs today. Refilled her albuterol inhaler, gabapentin and fluoxetine. Continue  to work with home PT and OT for strengthening.  -- Follow up in clinic in 1 month.    Candy Trujillo MD-PhD  [facility]    (Note: Chart documentation was done in part with Dragon Voice Recognition software. Although reviewed after completion, some word and grammatical errors may remain.)

## 2017-05-08 NOTE — TELEPHONE ENCOUNTER
Aniyah Prater, SHREYAS with [facility] called, patient has New Diagnosis of CHF and needs Skilled nursing approval.    Verbal approval 3 times per week for 2 weeks, 2 times for 2 weeks and 1 time per week for 4 week    Home Health aid for bathing 1 time per week for 8 weeks.     Per RN protocol, verbal approval given.  Patient has hospital follow up today with Dr. Trujillo.    Almaz Mclean RN, [facility]

## 2017-05-09 ENCOUNTER — TELEPHONE (OUTPATIENT)
Dept: PEDIATRICS | Facility: CLINIC | Age: 82
End: 2017-05-09

## 2017-05-09 NOTE — TELEPHONE ENCOUNTER
5-9-10  Ashanti from [facility] needs verbal ok before she faxes order.  OT 1x a wk for 4 weeks.  Effective 6.5.17  Call at [phone] confidential line to leave okay.

## 2017-05-10 NOTE — TELEPHONE ENCOUNTER
Verbal ok for OT 1x a week for 4 weeks given to Ashanti homecare RN.  She will fax order for Dr. Trujillo to sign.    Katie Prater RN,   [facility], [facility]

## 2017-05-11 NOTE — PROGRESS NOTES
Deagood Cruz,   Here are your recent results.   -- sodium level improved, still a little low, but near normal now. Protein and albumin levels are low. This is consistent with poor appetite.   -- work on getting stronger, try to eat higher protein and caloric dense foods.   -- we'll recheck at your follow up in 1 month.     Please call or Mychart to our office if you have further questions.     Candy Trujillo MD

## 2017-05-15 ENCOUNTER — TELEPHONE (OUTPATIENT)
Dept: PEDIATRICS | Facility: CLINIC | Age: 82
End: 2017-05-15

## 2017-05-15 ENCOUNTER — TELEPHONE (OUTPATIENT)
Dept: PHARMACY | Facility: CLINIC | Age: 82
End: 2017-05-15

## 2017-05-15 NOTE — TELEPHONE ENCOUNTER
Called patient to schedule a f/u MTM appt. Patient is scheduled May 30th.  Almaz Christopher, Pharm.D, BCACP  Medication Therapy Management Pharmacist

## 2017-05-15 NOTE — TELEPHONE ENCOUNTER
[facility] Call Center    Phone Message    Name of Caller: Aniyah    Phone Number: Other phone number:  [phone]    Best time to return call: Any    May a detailed message be left on voicemail: yes    Relation to patient: Other Name: Aniyah  Relationship: [employer]  Is there legal documentation in chart to discuss information with this person:NA  Reason for Call: Other: Patient has decided to discontinue nursing care. She does not feel she needs it. Please call with further questions.     Action Taken: Message routed to:  Primary Care p [phone]

## 2017-05-17 DIAGNOSIS — I10 ESSENTIAL HYPERTENSION WITH GOAL BLOOD PRESSURE LESS THAN 140/90: ICD-10-CM

## 2017-05-17 DIAGNOSIS — E78.5 HYPERLIPIDEMIA LDL GOAL <100: ICD-10-CM

## 2017-05-17 PROCEDURE — G0180 MD CERTIFICATION HHA PATIENT: HCPCS | Performed by: INTERNAL MEDICINE

## 2017-05-17 RX ORDER — DILTIAZEM HYDROCHLORIDE 120 MG/1
CAPSULE, EXTENDED RELEASE ORAL
Qty: 90 CAPSULE | Refills: 2 | Status: SHIPPED | OUTPATIENT
Start: 2017-05-17 | End: 2017-12-28

## 2017-05-17 RX ORDER — PRAVASTATIN SODIUM 40 MG
TABLET ORAL
Qty: 45 TABLET | Refills: 2 | Status: SHIPPED | OUTPATIENT
Start: 2017-05-17 | End: 2017-12-28

## 2017-05-17 NOTE — TELEPHONE ENCOUNTER
Diltiazem      Last Written Prescription Date: 12/29/16  Last Fill Quantity: 90, # refills: 1  Last Office Visit with [facility], [facility] or [facility] prescribing provider: 5/8/17  Next 5 appointments (look out 90 days)     Jun 14, 2017  9:00 AM CDT   Return Visit with Candy Trujillo MD   [facility]    [address] [place] [address] [phone]                   Potassium   Date Value Ref Range Status   05/08/2017 4.7 3.4 - 5.3 mmol/L Final     Creatinine   Date Value Ref Range Status   05/08/2017 0.70 0.52 - 1.04 mg/dL Final     BP Readings from Last 3 Encounters:   05/08/17 112/72   03/24/17 112/57   03/06/17 112/54     Medication filled for 9 months per protocol.      Katie Prater RN, [facility]       ______________________________________________________________________    pravastatin     Last Written Prescription Date: 12/29/16  Last Fill Quantity: 45, # refills: 1  Last Office Visit with [facility], [facility] or [facility] prescribing provider: 5/8/17  Next 5 appointments (look out 90 days)     Jun 14, 2017  9:00 AM CDT   Return Visit with Candy Trujillo MD   [facility]    [address] [place] [address] [phone]                   Lab Results   Component Value Date    CHOL 212 03/06/2017     Lab Results   Component Value Date     03/06/2017     Lab Results   Component Value Date    LDL 92 03/06/2017     Lab Results   Component Value Date    TRIG 79 03/06/2017     No results found for: CHOLHDLRATIO  Medication filled for 9 months per protocol.      Katie Prater RN, [facility]

## 2017-05-31 ENCOUNTER — TRANSFERRED RECORDS (OUTPATIENT)
Dept: HEALTH INFORMATION MANAGEMENT | Facility: CLINIC | Age: 82
End: 2017-05-31

## 2017-06-06 ENCOUNTER — ALLIED HEALTH/NURSE VISIT (OUTPATIENT)
Dept: PHARMACY | Facility: CLINIC | Age: 82
End: 2017-06-06
Payer: COMMERCIAL

## 2017-06-06 DIAGNOSIS — K59.09 CHRONIC CONSTIPATION: ICD-10-CM

## 2017-06-06 DIAGNOSIS — G47.00 INSOMNIA, UNSPECIFIED TYPE: ICD-10-CM

## 2017-06-06 DIAGNOSIS — E78.5 HYPERLIPIDEMIA LDL GOAL <130: ICD-10-CM

## 2017-06-06 DIAGNOSIS — B35.1 TOENAIL FUNGUS: ICD-10-CM

## 2017-06-06 DIAGNOSIS — F41.8 DEPRESSION WITH ANXIETY: ICD-10-CM

## 2017-06-06 DIAGNOSIS — J44.9 CHRONIC OBSTRUCTIVE PULMONARY DISEASE, UNSPECIFIED COPD TYPE (H): ICD-10-CM

## 2017-06-06 DIAGNOSIS — J30.1 SEASONAL ALLERGIC RHINITIS DUE TO POLLEN: ICD-10-CM

## 2017-06-06 DIAGNOSIS — M81.0 SENILE OSTEOPOROSIS: ICD-10-CM

## 2017-06-06 DIAGNOSIS — G25.81 RESTLESS LEG SYNDROME: Primary | ICD-10-CM

## 2017-06-06 DIAGNOSIS — H40.9 GLAUCOMA OF BOTH EYES, UNSPECIFIED GLAUCOMA: ICD-10-CM

## 2017-06-06 DIAGNOSIS — I10 ESSENTIAL HYPERTENSION, BENIGN: ICD-10-CM

## 2017-06-06 PROCEDURE — 99605 MTMS BY PHARM NP 15 MIN: CPT | Performed by: PHARMACIST

## 2017-06-06 PROCEDURE — 99607 MTMS BY PHARM ADDL 15 MIN: CPT | Performed by: PHARMACIST

## 2017-06-06 NOTE — MR AVS SNAPSHOT
After Visit Summary   6/6/2017    Nancy Benz    MRN: [id number]           Patient Information     Date Of Birth          8/5/1922        Visit Information        Provider Department      6/6/2017 1:00 PM Almaz Christopher, [facility] MTM        Today's Diagnoses     Restless leg syndrome    -  1    Chronic constipation        Hyperlipidemia LDL goal <130        Depression with anxiety        Senile osteoporosis        Chronic obstructive pulmonary disease, unspecified COPD type (H)        Insomnia, unspecified type        Glaucoma of both eyes, unspecified glaucoma        Essential hypertension, benign        Toenail fungus        Seasonal allergic rhinitis due to pollen          Care Instructions    Recommendations from today's MTM visit:                                                        1. Decrease calcium to 1 tablet twice daily    2. Could consider doxepin 3mg for sleep. Use liquid instead of tablets to keep cost down.    3. Could consider discontinuing aspirin    Next MTM visit: July 10th at 1pm.    To schedule another MTM appointment, please call the clinic directly or you may call the MTM scheduling line at [phone] or toll-free at [phone].     My Clinical Pharmacist's contact information:                                                      It was a pleasure seeing you today!  Please feel free to contact me with any questions or concerns you have.      Almaz Christopher, Pharm.D, [facility]  Medication Therapy Management Pharmacist  [phone]    You may receive a survey about the MT services you received.  I would appreciate your feedback to help me serve you better in the future. Please fill it out and return it when you can. Your comments will be anonymous.      My healthcare goals:                                                    Tips for Getting Restful Sleep (Also known as-Sleep Hygiene)  Healthy sleep is about more than not feeling tired!  Sleep influences our mood, concentration and memory, it can influence disorders such as high blood pressure, cardiovascular health, and even diabetes.  While most of us will experience occasional sleepless nights due to anxiety, excitement, too much coffee, shift work, or staying up with sick children, disruption of sleep that lasts more than a few days can be a sign of, or contribute, to serious medical conditions.  Be sure to keep your medical provider informed of changes in your sleep pattern and amount of sleep.   Here are some simple hints to a better nights sleep.  Feel the rhythm:  Like the stars and the tides, our bodies have cycles and rhythms. Your own individual sleep cycle is set by genes, habits, work schedules and a host of other factors. Most people do best with around eight hours of sleep per night. Children and adolescents sleep somewhat more, seniors  somewhat less. If you are experiencing a sleep disturbance, most often it is due to a disruption of the normal sleep rhythm or cycle. The key will be to reestablish a normal sleep cycle.  Set a time to go to sleep and a time to wake up and stick to it, even on the weekends.    Avoid napping. A 30-40 minute catnap in the afternoon is OK, anything longer will start to scramble your sleep pattern.   Avoid caffeine (including coffee, tea, many sodas and, sadly, chocolate) 4-6 hours before bedtime. This will mean 3-5pm for most people.   Avoid alcohol 4-6 hours before bedtime. Small amounts of alcohol may help people get to sleep, however, as the alcohol wears off it actually has a stimulant effect which wakes people up. Drinking alcohol is one of the worst things you can do to your sleep cycle!  Avoid heavy, spicy, sugary foods 4-6 hours before sleep. These can make it hard to stay asleep by causing heartburn or other problems.    Exercise moderately and regularly but not right before bed.  Strenuous exercise right before bed can make it harder to fall  asleep.     Make your sleep area a refuge.  Many people with sleep problems come to fear their bedrooms. Anything you can do to avoid this and make it a safe, restful place will help.   Comfortable bedding: Is your bed to hard, too soft, is your pillow to hard or soft, do you wake up with a stiff neck? Sometimes adding or removing an extra blanket depending on the weather can make a big difference.  Pillows need to be replaced from time to time.   Temperature and Ventilation: Most people find a cool but not cold room with good ventilation to be most conductive to a good nights sleep. Fans (depending on noise level) can add  white noise  which may help keep outside noise (traffic, airplanes) from interfering with your sleep.  Block out outside light and noise.  Reserve your bed for sleep and sex:  Your bed is not an office, workroom or study mcbride. Your bed needs to be associated with relaxation, not with things that might cause tension.  Getting in sleep mode:  A light snack before bed can help: Warm milk actually works! So do foods rich in the amino acid Tryptophan (Bananas, Turkey). We re not talking a feast here. A small slice of turkey or   a banana is all that s needed.  Relax on purpose: Relaxation techniques including meditation, deep diaphragmatic breathing, and progressive muscle relaxation can help produce a  relaxation response  which relieves tension and anxiety.   Leave your worries outside:  If you feel you need to worry about something schedule a  worry period  during the day but not before bed. Once the worry time is up, move on to other things.   Establish a pre sleep ritual: This can include a shower or bath, listening to music, inspirational readings. Many find incorporating a relaxing visualization once they are in bed completes the ritual.   If at first you don t succeed: Get into a comfortable position relax and see if you fall asleep.  If you don t fall asleep within 15-30 minutes GET OUT OF  BED and do some reading or relaxation until you are tired. Then go back to bed. Remember, associate bed with rest and relaxation!   Get off the clock: Most people reporting sleep problems actually sleep about twice as much as they think they do. Staring at the clock when trying to fall asleep only heightens frustration. Make sure your alarm is set (if needed) and then forget about it! Turn your clock to the wall or put it someplace you can not see it. This will reduce your sleep stress.   See your healthcare provider:  Physical ailments known to upset sleep include arthritis, acid reflux, menstruation, headaches, hot flashes and many more.   Mental health issues such as depression, anxiety and stress are also known to upset sleep.   Chemical abuse, especially alcohol abuse upsets sleep patterns.  The best long term solution is to reestablish normal sleep patterns. Medications can help in the short term but should not be considered long term solutions.  For chronic insomnia the research overwhelmingly supports cognitive-behavioral therapy (including sleep hygiene) over medication.                  Follow-ups after your visit        Your next 10 appointments already scheduled     Jun 14, 2017  9:00 AM CDT   Return Visit with Candy Trujillo MD   [facility])    [address] [facility] [address] [phone]            Jul 10, 2017  1:00 PM CDT   TELEMEDICINE with Almaz Christopher [facility] ([facility])    [address]  [address]  [facility] [id number] [phone]           Note: this is not an onsite visit; there is no need to come to the facility.            Sep 11, 2017  8:40 AM CDT   LAB with LAB FIRST FLOOR [facility] ([facility])    [address] [facility] [id number] [phone]           Patient must bring picture ID.  Patient should  be prepared to give a urine specimen  Please do not eat 10-12 hours before your appointment if you are coming in fasting for labs on lipids, cholesterol, or glucose (sugar).  Pregnant women should follow their Care Team instructions. Water with medications is okay. Do not drink coffee or other fluids.   If you have concerns about taking  your medications, please ask at office or if scheduling via [employer], send a message by clicking on Secure Messaging, Message Your Care Team.            Sep 11, 2017  9:00 AM CDT   [facility]    [address] [place] [id number] [phone]            Sep 27, 2017 10:00 AM CDT   Return Visit with Zayra Sanchez MD   [facility])    [address] [place] [id number] [phone]            Oct 27, 2017  9:00 AM CDT   Return Visit with Sarahi Hernandez MD   [facility])    [address] [place] [address] [phone]              Who to contact     If you have questions or need follow up information about today's clinic visit or your schedule please contact [facility] directly at [phone].  Normal or non-critical lab and imaging results will be communicated to you by MyChart, letter or phone within 4 business days after the clinic has received the results. If you do not hear from us within 7 days, please contact the clinic through Fotech[employer] or phone. If you have a critical or abnormal lab result, we will notify you by phone as soon as possible.  Submit refill requests through [employer]et or call your pharmacy and they will forward the refill request to us. Please allow 3 business days for your refill to be completed.          Additional Information About Your Visit        Proginet Information     [employer]et gives you secure access to your  electronic health record. If you see a primary care provider, you can also send messages to your care team and make appointments. If you have questions, please call your primary care clinic.  If you do not have a primary care provider, please call [phone] and they will assist you.        Care EveryWhere ID     This is your Care EveryWhere ID. This could be used by other organizations to access your [place] medical records  [id number]         Blood Pressure from Last 3 Encounters:   05/08/17 112/72   03/24/17 112/57   03/06/17 112/54    Weight from Last 3 Encounters:   05/08/17 118 lb 14.4 oz (53.9 kg)   03/24/17 121 lb (54.9 kg)   03/06/17 121 lb 8 oz (55.1 kg)              Today, you had the following     No orders found for display         Today's Medication Changes          These changes are accurate as of: 6/6/17  2:46 PM.  If you have any questions, ask your nurse or doctor.               These medicines have changed or have updated prescriptions.        Dose/Directions    gabapentin 100 MG capsule   Commonly known as:  NEURONTIN   This may have changed:    - how much to take  - additional instructions   Used for:  Restless leg syndrome        Dose:  200 mg   Take 2 capsules (200 mg) by mouth daily (late afternoon)   Quantity:  180 capsule   Refills:  3       polyethylene glycol powder   Commonly known as:  MIRALAX   This may have changed:    - when to take this  - reasons to take this   Used for:  Chronic constipation        Dose:  1 capful   Take 17 g (1 capful) by mouth daily   Quantity:  510 g   Refills:  11                Primary Care Provider Office Phone # Fax #    Candy Trujillo [phone]       [facility] [address]  [facility] [address]        Thank you!     Thank you for choosing [facility]  for your care. Our goal is always to provide you with excellent care. Hearing back from our patients is one way we can continue to improve our services.  Please take a few minutes to complete the written survey that you may receive in the mail after your visit with us. Thank you!             Your Updated Medication List - Protect others around you: Learn how to safely use, store and throw away your medicines at www.disposemymeds.org.          This list is accurate as of: 6/6/17  2:46 PM.  Always use your most recent med list.                   Brand Name Dispense Instructions for use    albuterol 108 (90 BASE) MCG/ACT Inhaler    PROAIR HFA/PROVENTIL HFA/VENTOLIN HFA    1 Inhaler    Inhale 2 puffs into the lungs every 6 hours as needed for shortness of breath / dyspnea or wheezing       aspirin 81 MG tablet      Take 81 mg by mouth daily       brimonidine-timolol 0.2-0.5 % ophthalmic solution    COMBIGAN    10 mL    Place 1 drop into both eyes 2 times daily       CALCIUM 600 + D 600-200 MG-UNIT Tabs   Generic drug:  Calcium Carb-Cholecalciferol      Take 1 tablet by mouth daily Vitamin L=4859 iu       ciclopirox 0.77 % cream    LOPROX    270 g    Apply topically 2 times daily To left big toe twice daily.       cycloSPORINE 0.05 % ophthalmic emulsion    RESTASIS    1 Box    Place 1 drop into both eyes every 12 hours       DILT- MG 24 hr capsule   Generic drug:  diltiazem     90 capsule    TAKE 1 CAPSULE (120 MG) BY MOUTH DAILY       FLOVENT  MCG/ACT Inhaler   Generic drug:  fluticasone     36 g    INHALE 2 PUFFS INTO THE LUNGS 2 TIMES DAILY       FLUoxetine 20 MG capsule    PROzac    90 capsule    Take 1 capsule (20 mg) by mouth daily       fluticasone 50 MCG/ACT spray    FLONASE    1 Bottle    Spray 2 sprays into both nostrils daily       gabapentin 100 MG capsule    NEURONTIN    180 capsule    Take 2 capsules (200 mg) by mouth daily (late afternoon)       GENTEAL SEVERE 0.3 % Gel ophthalmic gel   Generic drug:  hypromellose      Place 0.1 g (2 drops) into both eyes At Bedtime       polyethylene glycol powder    MIRALAX    510 g    Take 17 g (1 capful) by  mouth daily       pravastatin 40 MG tablet    PRAVACHOL    45 tablet    TAKE 1/2 TABLET EVERY DAY       SYSTANE 0.4-0.3 % Soln ophthalmic solution   Generic drug:  polyethylene glycol 0.4%- propylene glycol 0.3%      Place 1 drop into both eyes 3 times daily as needed for dry eyes       zolpidem 5 MG tablet    AMBIEN    90 tablet    Take 0.5-1 tablets (2.5-5 mg) by mouth nightly as needed for sleep

## 2017-06-06 NOTE — PATIENT INSTRUCTIONS
Recommendations from today's MTM visit:                                                        1. Decrease calcium to 1 tablet twice daily    2. Could consider doxepin 3mg for sleep. Use liquid instead of tablets to keep cost down.    3. Could consider discontinuing aspirin    Next MTM visit: July 10th at 1pm.    To schedule another MTM appointment, please call the clinic directly or you may call the MTM scheduling line at [phone] or toll-free at [phone].     My Clinical Pharmacist's contact information:                                                      It was a pleasure seeing you today!  Please feel free to contact me with any questions or concerns you have.      Almaz Christopher, Pharm.D, [facility]  Medication Therapy Management Pharmacist  [phone]    You may receive a survey about the MTM services you received.  I would appreciate your feedback to help me serve you better in the future. Please fill it out and return it when you can. Your comments will be anonymous.      My healthcare goals:                                                    Tips for Getting Restful Sleep (Also known as-Sleep Hygiene)  Healthy sleep is about more than not feeling tired! Sleep influences our mood, concentration and memory, it can influence disorders such as high blood pressure, cardiovascular health, and even diabetes.  While most of us will experience occasional sleepless nights due to anxiety, excitement, too much coffee, shift work, or staying up with sick children, disruption of sleep that lasts more than a few days can be a sign of, or contribute, to serious medical conditions.  Be sure to keep your medical provider informed of changes in your sleep pattern and amount of sleep.   Here are some simple hints to a better nights sleep.  Feel the rhythm:  Like the stars and the tides, our bodies have cycles and rhythms. Your own individual sleep cycle is set by genes, habits, work schedules and a host of other  factors. Most people do best with around eight hours of sleep per night. Children and adolescents sleep somewhat more, seniors  somewhat less. If you are experiencing a sleep disturbance, most often it is due to a disruption of the normal sleep rhythm or cycle. The key will be to reestablish a normal sleep cycle.  Set a time to go to sleep and a time to wake up and stick to it, even on the weekends.    Avoid napping. A 30-40 minute catnap in the afternoon is OK, anything longer will start to scramble your sleep pattern.   Avoid caffeine (including coffee, tea, many sodas and, sadly, chocolate) 4-6 hours before bedtime. This will mean 3-5pm for most people.   Avoid alcohol 4-6 hours before bedtime. Small amounts of alcohol may help people get to sleep, however, as the alcohol wears off it actually has a stimulant effect which wakes people up. Drinking alcohol is one of the worst things you can do to your sleep cycle!  Avoid heavy, spicy, sugary foods 4-6 hours before sleep. These can make it hard to stay asleep by causing heartburn or other problems.    Exercise moderately and regularly but not right before bed.  Strenuous exercise right before bed can make it harder to fall asleep.     Make your sleep area a refuge.  Many people with sleep problems come to fear their bedrooms. Anything you can do to avoid this and make it a safe, restful place will help.   Comfortable bedding: Is your bed to hard, too soft, is your pillow to hard or soft, do you wake up with a stiff neck? Sometimes adding or removing an extra blanket depending on the weather can make a big difference.  Pillows need to be replaced from time to time.   Temperature and Ventilation: Most people find a cool but not cold room with good ventilation to be most conductive to a good nights sleep. Fans (depending on noise level) can add  white noise  which may help keep outside noise (traffic, airplanes) from interfering with your sleep.  Block out outside  light and noise.  Reserve your bed for sleep and sex:  Your bed is not an office, workroom or study mcbride. Your bed needs to be associated with relaxation, not with things that might cause tension.  Getting in sleep mode:  A light snack before bed can help: Warm milk actually works! So do foods rich in the amino acid Tryptophan (Bananas, Turkey). We re not talking a feast here. A small slice of turkey or   a banana is all that s needed.  Relax on purpose: Relaxation techniques including meditation, deep diaphragmatic breathing, and progressive muscle relaxation can help produce a  relaxation response  which relieves tension and anxiety.   Leave your worries outside:  If you feel you need to worry about something schedule a  worry period  during the day but not before bed. Once the worry time is up, move on to other things.   Establish a pre sleep ritual: This can include a shower or bath, listening to music, inspirational readings. Many find incorporating a relaxing visualization once they are in bed completes the ritual.   If at first you don t succeed: Get into a comfortable position relax and see if you fall asleep.  If you don t fall asleep within 15-30 minutes GET OUT OF BED and do some reading or relaxation until you are tired. Then go back to bed. Remember, associate bed with rest and relaxation!   Get off the clock: Most people reporting sleep problems actually sleep about twice as much as they think they do. Staring at the clock when trying to fall asleep only heightens frustration. Make sure your alarm is set (if needed) and then forget about it! Turn your clock to the wall or put it someplace you can not see it. This will reduce your sleep stress.   See your healthcare provider:  Physical ailments known to upset sleep include arthritis, acid reflux, menstruation, headaches, hot flashes and many more.   Mental health issues such as depression, anxiety and stress are also known to upset sleep.   Chemical  abuse, especially alcohol abuse upsets sleep patterns.  The best long term solution is to reestablish normal sleep patterns. Medications can help in the short term but should not be considered long term solutions.  For chronic insomnia the research overwhelmingly supports cognitive-behavioral therapy (including sleep hygiene) over medication.

## 2017-06-06 NOTE — Clinical Note
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 I would like to try low dose doxepin which has been proven to be effective in the geriatric population. Would you be ok with this? Also, do you think we can stop her aspirin due to using for primary prevention and her age?  Thanks for considering, Mariama

## 2017-06-06 NOTE — PROGRESS NOTES
"SUBJECTIVE/OBJECTIVE:                           Nancy Benz is a 89 year old female called for an initial visit for Medication Therapy Management 2017.  She was referred to me from Candy Trujillo.     Chief Complaint: trouble sleeping    Allergies/ADRs: Reviewed in Epic  Tobacco: No tobacco use   Alcohol: 7-9 beverages / week (janie or glass of wine)  Caffeine: 1 cups/day of decaf coffee  Activity: limited-due to recovering from influenza; prior to this new step machine 4-5 times a week for 40 minutes  PMH: Reviewed in Epic    Insomnia: Current medications include: melatonin 10mg and zolpidem 5mg. Pt reports trouble staying asleep and trouble falling asleep despite zolpidem and melatonin use. Her mind is busy. Patient watches TV, falls asleep with the [employer] on. Lights out at 1030 and gets out of bed in the morning  because she doesn't fall asleep until 7am. Patient is not napping during the day.    Depression:  Current medications include: Fluoxetine 20mg once daily in the morning with breakfast. Pt reports that depression symptoms are stable. Patient is not sure if she has tried anything in the past.  PHQ-9 SCORE 11/28/2016   Total Score 3     RLS: current therapy includes gabapentin 300mg at 7pm. Patient was taking 200mg at night but this wasn't working to help her control her symptoms. Patient has dizziness and feels off balance but this is not a new symptom.     Hyperlipidemia: Current therapy includes pravastatin 20mg once daily.  Pt reports no significant myalgias or other side effects. Patient has tried going off in the past and found out that \"she needed it\".    Glaucoma: current therapy includes Combigan 1 drop ou bid, Restasis 1 drop q12, Systane 1 drop ou qh prn, Genteal marisol at bedtime. Patient does not report any side effects.    COPD: Current medications: Albuterol MDI and Fluticasone (Flovent) twice daily. Pt rinses their mouth after using steroid inhaler.    Pt is not experiencing " side effects.   Pt reports the following symptoms: increased SOB upon exertion  and chest tightness.  Pt does not have an COPD Action Plan on file.   Has spirometry been completed: Yes     Allergies: current therapy includes fluticasone 2 sprays en qd prn. Patient has not had to use this in a long time.    IBS-Constipation: current therapy includes miralax 1 capful qd prn. Patient has not needed to take in the last 2-3 weeks. Patient states this is effective.    Toenail fungus: current therapy includes ciclopirox twice daily. Patient doesn't feel like this has been getting any better.    Hypertension: Current medications include diltiazem 120mg daily.  Patient does not self-monitor BP.  Patient reports no current medication side effects. Patient is also taking a baby aspirin. Patient is not experiencing any side effects.    Osteoporosis: Current therapy includes: calcium 600/Vitamin D 400 tid. Pt is not experiencing side effects.   Pt is getting the following sources of dietary calcium: milk every morning and cottage cheese almost daily, cheese almost daily.   Last vitamin D level: None  DEXA History: 2016  Risk factors: post-menopausal    Current labs include:  BP Readings from Last 3 Encounters:   05/08/17 112/72   03/24/17 112/57   03/06/17 112/54     Today's Vitals: There were no vitals taken for this visit.  No results found for: A1C.  Lab Results   Component Value Date    CHOL 212 03/06/2017     Lab Results   Component Value Date    TRIG 79 03/06/2017     Lab Results   Component Value Date     03/06/2017     Lab Results   Component Value Date    LDL 92 03/06/2017       Liver Function Studies -   Recent Labs   Lab Test  05/08/17   1531   PROTTOTAL  6.7*   ALBUMIN  3.2*   BILITOTAL  0.4   ALKPHOS  51   AST  16   ALT  28       Lab Results   Component Value Date    UCRR 55 11/21/2016    MICROL 8 08/12/2016    UMALCR 9.70 08/12/2016       Last Basic Metabolic Panel:  Lab Results   Component Value Date    NA  132 05/08/2017      Lab Results   Component Value Date    POTASSIUM 4.7 05/08/2017     Lab Results   Component Value Date    CHLORIDE 93 05/08/2017     Lab Results   Component Value Date    BUN 17 05/08/2017     Lab Results   Component Value Date    CR 0.70 05/08/2017     GFR Estimate   Date Value Ref Range Status   05/08/2017 78 >60 mL/min/1.7m2 Final     Comment:     Non  GFR Calc   11/14/2016 74 >60 mL/min/1.7m2 Final     Comment:     Non  GFR Calc   08/01/2016 78 >60 mL/min/1.7m2 Final     Comment:     Non  GFR Calc     GFR Estimate If Black   Date Value Ref Range Status   05/08/2017 >90   GFR Calc   >60 mL/min/1.7m2 Final   11/14/2016 90 >60 mL/min/1.7m2 Final     Comment:      GFR Calc   08/01/2016 >90   GFR Calc   >60 mL/min/1.7m2 Final     TSH   Date Value Ref Range Status   11/14/2016 1.17 0.40 - 4.00 mU/L Final   ]    Most Recent Immunizations   Administered Date(s) Administered     Influenza (High Dose) 3 valent vaccine 09/22/2015     Pneumococcal (PCV 13) 07/05/2016     Pneumococcal 23 valent 10/19/2010     TD (ADULT, 7+) 09/01/2000     Tdap (Adacel,Boostrix) 09/22/2015       ASSESSMENT:                             Current medications were reviewed today.     Insomnia: Needs Improvement. Pt may benefit from doxepin 3mg and discontinuing zolpidem and continuing melatonin at this time. Discussed sleep hygiene.    Depression: Stable.     RLS: stable    Hyperlipidemia: Stable. Patient may benefit from discontinuing statin due to age. Patient declines at this time.    Glaucoma/Dry Eyes: stable    COPD: Stable. Patient would benefit from no changes at this time.    Allergies: stable    IBS-constipation: stable    Toenail Fungus: stable; discussed this can take time to resolve    Hypertension: Stable. Patient is meeting BP goal of < 150/90mmHg.    Osteoporosis: Needs Improvement. Pt is exceeding RDI of calcium  1200mg/day.    PLAN:                            Recommend decreasing calcium to 600mg twice daily  Could consider discontinuing zolpidem and initiating doxepin 3mg 30 minutes before bedtime  Could consider discontinuing ASA for primary prevention    I spent 60 minutes with this patient today.   A copy of the visit note was provided to the patient's primary care provider.    Will follow up in 1 month.    The patient was mailed a summary of these recommendations as an after visit summary.     Almaz Christopher, Pharm.D, [facility]  Medication Therapy Management Pharmacist    Addendum: ok to discontinue ASA and ambien and start doxepin per Dr. Trujillo.

## 2017-06-09 RX ORDER — DOXEPIN HYDROCHLORIDE 10 MG/ML
3 SOLUTION ORAL AT BEDTIME
Qty: 30 ML | Refills: 1 | Status: SHIPPED | OUTPATIENT
Start: 2017-06-09 | End: 2017-07-24

## 2017-06-14 ENCOUNTER — TELEPHONE (OUTPATIENT)
Dept: PEDIATRICS | Facility: CLINIC | Age: 82
End: 2017-06-14

## 2017-06-14 ENCOUNTER — OFFICE VISIT (OUTPATIENT)
Dept: PEDIATRICS | Facility: CLINIC | Age: 82
End: 2017-06-14
Payer: COMMERCIAL

## 2017-06-14 VITALS
SYSTOLIC BLOOD PRESSURE: 118 MMHG | BODY MASS INDEX: 22.65 KG/M2 | DIASTOLIC BLOOD PRESSURE: 69 MMHG | WEIGHT: 115.4 LBS | OXYGEN SATURATION: 93 % | HEART RATE: 52 BPM | HEIGHT: 60 IN | TEMPERATURE: 98 F

## 2017-06-14 DIAGNOSIS — F51.04 CHRONIC INSOMNIA: Primary | ICD-10-CM

## 2017-06-14 PROCEDURE — 99213 OFFICE O/P EST LOW 20 MIN: CPT | Performed by: INTERNAL MEDICINE

## 2017-06-14 RX ORDER — DOXEPIN HYDROCHLORIDE 10 MG/ML
3 SOLUTION ORAL AT BEDTIME
Qty: 30 ML | Refills: 0 | Status: SHIPPED | OUTPATIENT
Start: 2017-06-14 | End: 2017-07-10

## 2017-06-14 NOTE — PATIENT INSTRUCTIONS
Medication(s) prescribed today:    Orders Placed This Encounter   Medications     doxepin (SINEQUAN) 10 MG/ML (HIGH CONC) solution     Sig: Take 0.3 mLs (3 mg) by mouth At Bedtime     Dispense:  30 mL     Refill:  0

## 2017-06-14 NOTE — NURSING NOTE
Chief Complaint   Patient presents with     RECHECK     influenza f/u     Insomnia       Initial /69  Pulse 52  Temp 98  F (36.7  C) (Temporal)  Ht 5' (1.524 m)  Wt 115 lb 6.4 oz (52.3 kg)  SpO2 93%  BMI 22.54 kg/m2 Estimated body mass index is 22.54 kg/(m^2) as calculated from the following:    Height as of this encounter: 5' (1.524 m).    Weight as of this encounter: 115 lb 6.4 oz (52.3 kg).  Medication Reconciliation: complete     Oxana Alcala, CMA

## 2017-06-14 NOTE — TELEPHONE ENCOUNTER
[facility] Call Center    Phone Message    Name of Caller: Ashanti    Phone Number: Other phone number:  [phone]    Best time to return call: ANy    May a detailed message be left on voicemail: yes    Relation to patient: Other Name: [facility]  Relationship: OT  Is there legal documentation in chart to discuss information with this person: n/a    Reason for Call: Other: Requesting Verbal orders- discontinue home health aid care orders- hold OT next week and return OT the following week. Please advise.    Action Taken: Message routed to:  Primary Care p [phone]

## 2017-06-14 NOTE — MR AVS SNAPSHOT
After Visit Summary   6/14/2017    Nancy Benz    MRN: [id number]           Patient Information     Date Of Birth          8/5/1922        Visit Information        Provider Department      6/14/2017 9:00 AM Candy Trujillo MD [facility]        Today's Diagnoses     Chronic insomnia    -  1      Care Instructions    Medication(s) prescribed today:    Orders Placed This Encounter   Medications     doxepin (SINEQUAN) 10 MG/ML (HIGH CONC) solution     Sig: Take 0.3 mLs (3 mg) by mouth At Bedtime     Dispense:  30 mL     Refill:  0               Follow-ups after your visit        Your next 10 appointments already scheduled     Jul 10, 2017  1:00 PM CDT   TELEMEDICINE with Almaz Christopher [facility])    [address] [place] [address] [address]  [place] [id number]  [facility] [address] [phone]           Note: this is not an onsite visit; there is no need to come to the facility.            Sep 11, 2017  8:40 AM CDT   LAB with LAB FIRST FLOOR [facility])    [address] [place] [address] [phone]           Patient must bring picture ID.  Patient should be prepared to give a urine specimen  Please do not eat 10-12 hours before your appointment if you are coming in fasting for labs on lipids, cholesterol, or glucose (sugar).  Pregnant women should follow their Care Team instructions. Water with medications is okay. Do not drink coffee or other fluids.   If you have concerns about taking  your medications, please ask at office or if scheduling via [facility], send a message by clicking on Secure Messaging, Message Your Care Team.            Sep 11, 2017  9:00 AM CDT   PHYSICAL with Candy Trujillo MD   [facility])    [id number] [place] [address] Avenue [facility] [address] [phone]            Sep 27, 2017 10:00 AM CDT   [facility]    [address] [place] [id number] [phone]            Oct 27, 2017  9:00 AM CDT   [facility]    [address] [place] [address] [phone]              Who to contact     If you have questions or need follow up information about today's clinic visit or your schedule please contact [facility] directly at [phone].  Normal or non-critical lab and imaging results will be communicated to you by CIRQYhart, letter or phone within 4 business days after the clinic has received the results. If you do not hear from us within 7 days, please contact the clinic through CIRQYhart or phone. If you have a critical or abnormal lab result, we will notify you by phone as soon as possible.  Submit refill requests through Mechio or call your pharmacy and they will forward the refill request to us. Please allow 3 business days for your refill to be completed.          Additional Information About Your Visit        CIRQYhart Information     Mechio gives you secure access to your electronic health record. If you see a primary care provider, you can also send messages to your care team and make appointments. If you have questions, please call your primary care clinic.  If you do not have a primary care provider, please call [phone] and they will assist you.      Mechio is an electronic gateway that provides easy, online access to your medical records. With Mechio, you can request a clinic appointment, read your test results, renew a prescription or communicate with your care team.     To access your existing account, please contact your [facility] or call [phone] for assistance.        Care EveryWhere ID     This is your Care EveryWhere ID. This could be used  by other organizations to access your [place] medical records  [id number]        Your Vitals Were     Pulse Temperature Height Pulse Oximetry BMI (Body Mass Index)       52 98  F (36.7  C) (Temporal) 5' (1.524 m) 93% 22.54 kg/m2        Blood Pressure from Last 3 Encounters:   06/14/17 118/69   05/08/17 112/72   03/24/17 112/57    Weight from Last 3 Encounters:   06/14/17 115 lb 6.4 oz (52.3 kg)   05/08/17 118 lb 14.4 oz (53.9 kg)   03/24/17 121 lb (54.9 kg)              Today, you had the following     No orders found for display         Today's Medication Changes          These changes are accurate as of: 6/14/17  9:35 AM.  If you have any questions, ask your nurse or doctor.               These medicines have changed or have updated prescriptions.        Dose/Directions    * doxepin 10 MG/ML (HIGH CONC) solution   Commonly known as:  SINEquan   This may have changed:  Another medication with the same name was added. Make sure you understand how and when to take each.   Used for:  Insomnia, unspecified type        Dose:  3 mg   Take 0.3 mLs (3 mg) by mouth At Bedtime Take 30 minutes before bedtime.   Quantity:  30 mL   Refills:  1       * doxepin 10 MG/ML (HIGH CONC) solution   Commonly known as:  SINEquan   This may have changed:  You were already taking a medication with the same name, and this prescription was added. Make sure you understand how and when to take each.   Used for:  Chronic insomnia        Dose:  3 mg   Take 0.3 mLs (3 mg) by mouth At Bedtime   Quantity:  30 mL   Refills:  0       gabapentin 100 MG capsule   Commonly known as:  NEURONTIN   This may have changed:    - how much to take  - additional instructions   Used for:  Restless leg syndrome        Dose:  200 mg   Take 2 capsules (200 mg) by mouth daily (late afternoon)   Quantity:  180 capsule   Refills:  3       polyethylene glycol powder   Commonly known as:  MIRALAX   This may have changed:    - when to take this  - reasons to take this    Used for:  Chronic constipation        Dose:  1 capful   Take 17 g (1 capful) by mouth daily   Quantity:  510 g   Refills:  11       * Notice:  This list has 2 medication(s) that are the same as other medications prescribed for you. Read the directions carefully, and ask your doctor or other care provider to review them with you.         Where to get your medicines      These medications were sent to [facility] [place], [place] - [address], [address]  [address], [facility] [address]     Phone:  [phone]     doxepin 10 MG/ML (HIGH CONC) solution                Primary Care Provider Office Phone # Fax #    Candy Trujillo [phone]       [facility] [address]  [facility] [address]        Thank you!     Thank you for choosing [facility]  for your care. Our goal is always to provide you with excellent care. Hearing back from our patients is one way we can continue to improve our services. Please take a few minutes to complete the written survey that you may receive in the mail after your visit with us. Thank you!             Your Updated Medication List - Protect others around you: Learn how to safely use, store and throw away your medicines at www.disposemymeds.org.          This list is accurate as of: 6/14/17  9:35 AM.  Always use your most recent med list.                   Brand Name Dispense Instructions for use    albuterol 108 (90 BASE) MCG/ACT Inhaler    PROAIR HFA/PROVENTIL HFA/VENTOLIN HFA    1 Inhaler    Inhale 2 puffs into the lungs every 6 hours as needed for shortness of breath / dyspnea or wheezing       brimonidine-timolol 0.2-0.5 % ophthalmic solution    COMBIGAN    10 mL    Place 1 drop into both eyes 2 times daily       CALCIUM 600 + D 600-200 MG-UNIT Tabs   Generic drug:  Calcium Carb-Cholecalciferol      Take 1 tablet by mouth daily Vitamin E=9438 iu       ciclopirox 0.77 % cream    LOPROX    270 g     Apply topically 2 times daily To left big toe twice daily.       cycloSPORINE 0.05 % ophthalmic emulsion    RESTASIS    1 Box    Place 1 drop into both eyes every 12 hours       DILT- MG 24 hr capsule   Generic drug:  diltiazem     90 capsule    TAKE 1 CAPSULE (120 MG) BY MOUTH DAILY       * doxepin 10 MG/ML (HIGH CONC) solution    SINEquan    30 mL    Take 0.3 mLs (3 mg) by mouth At Bedtime Take 30 minutes before bedtime.       * doxepin 10 MG/ML (HIGH CONC) solution    SINEquan    30 mL    Take 0.3 mLs (3 mg) by mouth At Bedtime       FLOVENT  MCG/ACT Inhaler   Generic drug:  fluticasone     36 g    INHALE 2 PUFFS INTO THE LUNGS 2 TIMES DAILY       FLUoxetine 20 MG capsule    PROzac    90 capsule    Take 1 capsule (20 mg) by mouth daily       fluticasone 50 MCG/ACT spray    FLONASE    1 Bottle    Spray 2 sprays into both nostrils daily       gabapentin 100 MG capsule    NEURONTIN    180 capsule    Take 2 capsules (200 mg) by mouth daily (late afternoon)       GENTEAL SEVERE 0.3 % Gel ophthalmic gel   Generic drug:  hypromellose      Place 0.1 g (2 drops) into both eyes At Bedtime       polyethylene glycol powder    MIRALAX    510 g    Take 17 g (1 capful) by mouth daily       pravastatin 40 MG tablet    PRAVACHOL    45 tablet    TAKE 1/2 TABLET EVERY DAY       SYSTANE 0.4-0.3 % Soln ophthalmic solution   Generic drug:  polyethylene glycol 0.4%- propylene glycol 0.3%      Place 1 drop into both eyes 3 times daily as needed for dry eyes       * Notice:  This list has 2 medication(s) that are the same as other medications prescribed for you. Read the directions carefully, and ask your doctor or other care provider to review them with you.

## 2017-06-14 NOTE — TELEPHONE ENCOUNTER
Verbal orders given to [facility] RN to discontinue home health aid care orders- hold OT next week and return OT the following week    Katie Prater RN,   [facility], [facility]

## 2017-06-14 NOTE — PROGRESS NOTES
SUBJECTIVE:                                                    Nancy Benz is a 89 year old female who presents to clinic today for the following health issues:      Insomnia     Onset: 4 nights    Description:   Time to fall asleep (sleep latency): Greater than 2 hours  Middle of night awakening:  no - pt. States she does not fall asleep at all until 8am  Early morning awakening:  no     Progression of Symptoms:  same    Accompanying Signs & Symptoms:  Daytime sleepiness/napping: no   Excessive snoring/apnea: YES  Restless legs: no   Frequent urination: YES- a lot more almost every hour  Chronic pain:  no   History:  Prior Insomnia: YES- but pt. States not as bad     Precipitating factors:   New stressful situation: no  Caffeine intake: no  OTC decongestants: no  Any new medications: no    Alleviating factors:  Self medicating (alcohol, etc.):  Just a bottle of beer every night with dinner       Therapies Tried and outcome: Ambien, [place] keeps pt. Wide awake    Pt. Would be interested in a liquid medication that was mentioned by Mariama Lyon.    Pt. States she is doing much better from the influenza except for her insomnia now.    No new stress. Health is actually improving from prior influenza.  Is doing PT, down to once a week, about to be discharged.     She met with him TM pharmacist, after review her medication, one suggestion was to try doxepin. She is here to get a prescription of this, no other concerns.         Problem list, Medication list, Allergies, and Medical/Social/Surgical histories reviewed in [facility] and updated as appropriate.    OBJECTIVE:                                                    /69  Pulse 52  Temp 98  F (36.7  C) (Temporal)  Ht 5' (1.524 m)  Wt 115 lb 6.4 oz (52.3 kg)  SpO2 93%  BMI 22.54 kg/m2    GEN: healthy, alert and no distress       ASSESSMENT/PLAN:                                                      89 year old female with the following diagnoses and treatment  plan:      ICD-10-CM    1. Chronic insomnia F51.04 doxepin (SINEQUAN) 10 MG/ML (HIGH CONC) solution       -- Trial of doxepin to see if it helps. She has another follow up with him TM pharmacist in one month, with me in 3 months.      Candy Trujillo MD-PhD  [facility]    (Note: Chart documentation was done in part with Dragon Voice Recognition software. Although reviewed after completion, some word and grammatical errors may remain.)

## 2017-06-16 ENCOUNTER — MYC MEDICAL ADVICE (OUTPATIENT)
Dept: PEDIATRICS | Facility: CLINIC | Age: 82
End: 2017-06-16

## 2017-06-19 NOTE — TELEPHONE ENCOUNTER
Sent patient message on Planbus to daughter regarding medication list as we have in chart.    Almaz Mclean RN, [facility]

## 2017-06-29 ENCOUNTER — TRANSFERRED RECORDS (OUTPATIENT)
Dept: HEALTH INFORMATION MANAGEMENT | Facility: CLINIC | Age: 82
End: 2017-06-29

## 2017-07-06 ENCOUNTER — TELEPHONE (OUTPATIENT)
Dept: PEDIATRICS | Facility: CLINIC | Age: 82
End: 2017-07-06

## 2017-07-06 DIAGNOSIS — Z71.89 ADVANCED DIRECTIVES, COUNSELING/DISCUSSION: Primary | ICD-10-CM

## 2017-07-06 NOTE — TELEPHONE ENCOUNTER
[facility] Call Center    Phone Message    Name of Caller: Ivana    Phone Number: [phone]      Best time to return call: any    May a detailed message be left on voicemail: yes    Relation to patient: Self    Reason for Call: [facility] stated that she needs the Provider to sign the POLST Form for the Patient, and Fax back, only signed for her     Action Taken: Message routed to:  Primary Care p [phone]

## 2017-07-07 NOTE — TELEPHONE ENCOUNTER
Ivana from [facility] states we did get a form on Nancy.  She states she knows we got it because she received in via fax not signed.  Ivana will refax the form to us at fax# [phone].    Brie Galindo CMA    I found the POLST form for Nancy.  Placed it on Dr. Trujillo's desk to sign.    Brie Galindo CMA

## 2017-07-07 NOTE — TELEPHONE ENCOUNTER
Left message for Ivana to call back from  Homecare need fax number to fax her the POLST Form completed by Dr. Trujillo.    POLST form is on my desk    Brie Galindo CMA

## 2017-07-07 NOTE — TELEPHONE ENCOUNTER
POLST review:  Pt signed on 6/30/17: DNR, trial of intubation, use of IV/IM antibiotic, offer food/lidquis by mouth, IV fluids  Agreed and signed 7/7/2017   Please fax.

## 2017-07-10 ENCOUNTER — ALLIED HEALTH/NURSE VISIT (OUTPATIENT)
Dept: PHARMACY | Facility: CLINIC | Age: 82
End: 2017-07-10
Payer: COMMERCIAL

## 2017-07-10 DIAGNOSIS — G47.00 INSOMNIA, UNSPECIFIED TYPE: ICD-10-CM

## 2017-07-10 DIAGNOSIS — M81.0 SENILE OSTEOPOROSIS: Primary | ICD-10-CM

## 2017-07-10 PROCEDURE — 99606 MTMS BY PHARM EST 15 MIN: CPT | Performed by: PHARMACIST

## 2017-07-10 NOTE — PROGRESS NOTES
SUBJECTIVE/OBJECTIVE:                Nancy Benz is a 89 year old female called for a follow-up visit for Medication Therapy Management.  She was referred to me from Candy Trujillo .     Chief Complaint: Follow up from our visit on 6/6/2017.  Insomnia  Tobacco: No tobacco use   Alcohol: 7-9 beverages / week    Medication Adherence: no issues reported    Insomnia: Current medications include: doxepin 3mg at bedtime. Pulls the syringe down to the 2 on the syringe. Patient is only taking 0.2mls instead of 0.3mls. Patient is taking 30 minutes before bedtime. Patient feels like she is still awake all night but not real often. Patient does have to get up to go to the bathroom.  Patient does not report any side effects.    Osteoporosis: Current therapy includes: calcium 600/Vitamin D 400 bid. Pt is not experiencing side effects.   Pt is getting the following sources of dietary calcium: milk every morning and cottage cheese almost daily, cheese almost daily.   Last vitamin D level: None  DEXA History: 2016  Risk factors: post-menopausal    Current labs include:  BP Readings from Last 3 Encounters:   06/14/17 118/69   05/08/17 112/72   03/24/17 112/57     Today's Vitals: There were no vitals taken for this visit.  No results found for: A1C.  Lab Results   Component Value Date    CHOL 212 03/06/2017     Lab Results   Component Value Date    TRIG 79 03/06/2017     Lab Results   Component Value Date     03/06/2017     Lab Results   Component Value Date    LDL 92 03/06/2017       Liver Function Studies -   Recent Labs   Lab Test  05/08/17   1531   PROTTOTAL  6.7*   ALBUMIN  3.2*   BILITOTAL  0.4   ALKPHOS  51   AST  16   ALT  28       Lab Results   Component Value Date    UCRR 55 11/21/2016    MICROL 8 08/12/2016    UMALCR 9.70 08/12/2016       Last Basic Metabolic Panel:  Lab Results   Component Value Date     05/08/2017      Lab Results   Component Value Date    POTASSIUM 4.7 05/08/2017     Lab Results   Component  Value Date    CHLORIDE 93 05/08/2017     Lab Results   Component Value Date    BUN 17 05/08/2017     Lab Results   Component Value Date    CR 0.70 05/08/2017     GFR Estimate   Date Value Ref Range Status   05/08/2017 78 >60 mL/min/1.7m2 Final     Comment:     Non  GFR Calc   11/14/2016 74 >60 mL/min/1.7m2 Final     Comment:     Non  GFR Calc   08/01/2016 78 >60 mL/min/1.7m2 Final     Comment:     Non  GFR Calc     GFR Estimate If Black   Date Value Ref Range Status   05/08/2017 >90   GFR Calc   >60 mL/min/1.7m2 Final   11/14/2016 90 >60 mL/min/1.7m2 Final     Comment:      GFR Calc   08/01/2016 >90   GFR Calc   >60 mL/min/1.7m2 Final     TSH   Date Value Ref Range Status   11/14/2016 1.17 0.40 - 4.00 mU/L Final   ]    Most Recent Immunizations   Administered Date(s) Administered     Influenza (High Dose) 3 valent vaccine 09/22/2015     Pneumococcal (PCV 13) 07/05/2016     Pneumococcal 23 valent 10/19/2010     TD (ADULT, 7+) 09/01/2000     Tdap (Adacel,Boostrix) 09/22/2015         ASSESSMENT:              Current medications were reviewed today as discussed above.      Medication Adherence: no issues identified    Insomnia: Needs Improvement. Patient would benefit taking 3mg of doxepin.    Osteoporosis: Stable. Pt is meeting RDI of calcium 1200mg/day.   PLAN:                  Patient to take 3mg of doxepin as previously prescribed    I spent 15 minutes with this patient today.  All changes were made via collaborative practice agreement with Candy Trujillo. A copy of the visit note was provided to the patient's primary care provider.     Will follow up in 2 weeks.    The patient was sent via [employer] a summary of these recommendations as an after visit summary.    Almaz Christopher, Pharm.D, BCACP  Medication Therapy Management Pharmacist

## 2017-07-10 NOTE — TELEPHONE ENCOUNTER
Called Ivana with [employer] to obtain fax number.  DAVE faxed to fax #: [phone].  Rowena Dominguez CMA

## 2017-07-10 NOTE — MR AVS SNAPSHOT
After Visit Summary   7/10/2017    Nancy Benz    MRN: [id number]           Patient Information     Date Of Birth          8/5/1922        Visit Information        Provider Department      7/10/2017 1:00 PM Almaz Christopher, [facility] [place]        Today's Diagnoses     Senile osteoporosis    -  1    Insomnia, unspecified type          Care Instructions    Take 0.3ml or 3mg of doxepin at night for sleep.            Follow-ups after your visit        Your next 10 appointments already scheduled     Jul 24, 2017  1:30 PM CDT   TELEMEDICINE with Almaz Christopher, [facility] [place] ([facility])    [address] [place] [address] [id number]  [facility] [address] [phone]           Note: this is not an onsite visit; there is no need to come to the facility.            Sep 11, 2017  8:40 AM CDT   LAB with LAB FIRST FLOOR [facility])    [address] [facility] [address] [phone]           Patient must bring picture ID.  Patient should be prepared to give a urine specimen  Please do not eat 10-12 hours before your appointment if you are coming in fasting for labs on lipids, cholesterol, or glucose (sugar).  Pregnant women should follow their Care Team instructions. Water with medications is okay. Do not drink coffee or other fluids.   If you have concerns about taking  your medications, please ask at office or if scheduling via [employer], send a message by clicking on Secure Messaging, Message Your Care Team.            Sep 11, 2017  9:00 AM CDT   PHYSICAL with Candy Trujillo MD   [facility] ([facility])    [address] [facility] [address] [phone]            Sep 27, 2017 10:00 AM CDT   Return Visit with Zayra Sanchez MD   [facility] ([facility])    [address] [place] [address]  [facility] [address]   [phone]            Oct 27, 2017  9:00 AM CDT   Return Visit with Sarahi Hernandez MD   [facility] ([facility])    [address] [place] [address]   [phone]              Who to contact     If you have questions or need follow up information about today's clinic visit or your schedule please contact [facility] directly at [phone].  Normal or non-critical lab and imaging results will be communicated to you by SupportSpacehart, letter or phone within 4 business days after the clinic has received the results. If you do not hear from us within 7 days, please contact the clinic through Amar[employer] or phone. If you have a critical or abnormal lab result, we will notify you by phone as soon as possible.  Submit refill requests through [employer] or call your pharmacy and they will forward the refill request to us. Please allow 3 business days for your refill to be completed.          Additional Information About Your Visit        SupportSpacehart Information     [employer] gives you secure access to your electronic health record. If you see a primary care provider, you can also send messages to your care team and make appointments. If you have questions, please call your primary care clinic.  If you do not have a primary care provider, please call [phone] and they will assist you.        Care EveryWhere ID     This is your Care EveryWhere ID. This could be used by other organizations to access your [facility] medical records  [id number]         Blood Pressure from Last 3 Encounters:   06/14/17 118/69   05/08/17 112/72   03/24/17 112/57    Weight from Last 3 Encounters:   06/14/17 115 lb 6.4 oz (52.3 kg)   05/08/17 118 lb 14.4 oz (53.9 kg)   03/24/17 121 lb (54.9 kg)              Today, you had the following     No orders found for display         Today's Medication Changes          These changes are accurate as of: 7/10/17   1:23 PM.  If you have any questions, ask your nurse or doctor.               These medicines have changed or have updated prescriptions.        Dose/Directions    gabapentin 100 MG capsule   Commonly known as:  NEURONTIN   This may have changed:    - how much to take  - additional instructions   Used for:  Restless leg syndrome        Dose:  200 mg   Take 2 capsules (200 mg) by mouth daily (late afternoon)   Quantity:  180 capsule   Refills:  3       polyethylene glycol powder   Commonly known as:  MIRALAX   This may have changed:    - when to take this  - reasons to take this   Used for:  Chronic constipation        Dose:  1 capful   Take 17 g (1 capful) by mouth daily   Quantity:  510 g   Refills:  11                Primary Care Provider Office Phone # Fax #    Candy Trujillo [phone]       [facility] [address]  [facility] [address]        Equal Access to Services     CUATE [facility] HospitalDIANA : Hadii aad ku hadasho Soomaali, waaxda luqadaha, qaybta kaalmada adeyeyagiuseppe, clark alfredo . So [facility] [phone].    ATENCIÓN: Si habla español, tiene a crandall disposición servicios gratuitos de asistencia lingüística. [facility] [phone].    We comply with applicable federal civil rights laws and [place] laws. We do not discriminate on the basis of race, color, national origin, age, disability sex, sexual orientation or gender identity.            Thank you!     Thank you for choosing [facility]  for your care. Our goal is always to provide you with excellent care. Hearing back from our patients is one way we can continue to improve our services. Please take a few minutes to complete the written survey that you may receive in the mail after your visit with us. Thank you!             Your Updated Medication List - Protect others around you: Learn how to safely use, store and throw away your medicines at www.disposemymeds.org.          This list is accurate  as of: 7/10/17  1:23 PM.  Always use your most recent med list.                   Brand Name Dispense Instructions for use Diagnosis    albuterol 108 (90 BASE) MCG/ACT Inhaler    PROAIR HFA/PROVENTIL HFA/VENTOLIN HFA    1 Inhaler    Inhale 2 puffs into the lungs every 6 hours as needed for shortness of breath / dyspnea or wheezing    Asthma exacerbation, Influenza due to influenza virus, type B, Hospital discharge follow-up       brimonidine-timolol 0.2-0.5 % ophthalmic solution    COMBIGAN    10 mL    Place 1 drop into both eyes 2 times daily    Primary open angle glaucoma of both eyes, moderate stage       CALCIUM 600 + D 600-200 MG-UNIT Tabs   Generic drug:  Calcium Carb-Cholecalciferol      Take 1 tablet by mouth daily Vitamin R=5365 iu    Keratoconjunctivitis sicca, not specified as Sjogren's, left, Glaucoma       ciclopirox 0.77 % cream    LOPROX    270 g    Apply topically 2 times daily To left big toe twice daily.    Dermatophytosis of nail, Peripheral arterial disease (H)       cycloSPORINE 0.05 % ophthalmic emulsion    RESTASIS    1 Box    Place 1 drop into both eyes every 12 hours    Keratoconjunctivitis sicca, not specified as Sjogren's, left       DILT- MG 24 hr capsule   Generic drug:  diltiazem     90 capsule    TAKE 1 CAPSULE (120 MG) BY MOUTH DAILY    Essential hypertension with goal blood pressure less than 140/90       doxepin 10 MG/ML (HIGH CONC) solution    SINEquan    30 mL    Take 0.3 mLs (3 mg) by mouth At Bedtime Take 30 minutes before bedtime.    Insomnia, unspecified type       FLOVENT  MCG/ACT Inhaler   Generic drug:  fluticasone     36 g    INHALE 2 PUFFS INTO THE LUNGS 2 TIMES DAILY    Simple chronic bronchitis (H)       FLUoxetine 20 MG capsule    PROzac    90 capsule    Take 1 capsule (20 mg) by mouth daily    Depression with anxiety       fluticasone 50 MCG/ACT spray    FLONASE    1 Bottle    Spray 2 sprays into both nostrils daily    Postnasal drip       gabapentin 100  MG capsule    NEURONTIN    180 capsule    Take 2 capsules (200 mg) by mouth daily (late afternoon)    Restless leg syndrome       GENTEAL SEVERE 0.3 % Gel ophthalmic gel   Generic drug:  hypromellose      Place 0.1 g (2 drops) into both eyes At Bedtime        polyethylene glycol powder    MIRALAX    510 g    Take 17 g (1 capful) by mouth daily    Chronic constipation       pravastatin 40 MG tablet    PRAVACHOL    45 tablet    TAKE 1/2 TABLET EVERY DAY    Hyperlipidemia LDL goal <100       SYSTANE 0.4-0.3 % Soln ophthalmic solution   Generic drug:  polyethylene glycol 0.4%- propylene glycol 0.3%      Place 1 drop into both eyes 3 times daily as needed for dry eyes    Keratoconjunctivitis sicca, not specified as Sjogren's, left, Glaucoma

## 2017-07-24 ENCOUNTER — ALLIED HEALTH/NURSE VISIT (OUTPATIENT)
Dept: PHARMACY | Facility: CLINIC | Age: 82
End: 2017-07-24
Payer: COMMERCIAL

## 2017-07-24 DIAGNOSIS — G47.00 INSOMNIA, UNSPECIFIED TYPE: Primary | ICD-10-CM

## 2017-07-24 PROCEDURE — 99606 MTMS BY PHARM EST 15 MIN: CPT | Performed by: PHARMACIST

## 2017-07-24 NOTE — MR AVS SNAPSHOT
After Visit Summary   7/24/2017    Nancy Benz    MRN: [id number]           Patient Information     Date Of Birth          8/5/1922        Visit Information        Provider Department      7/24/2017 1:30 PM Almaz Christopher, [facility]        Today's Diagnoses     Insomnia, unspecified type    -  1      Care Instructions    Try melatonin 3-5mg at night for sleep.  I will call you in 1 month to see how it is working.    Almaz Christopher, Pharm.D, BCACP  Medication Therapy Management Pharmacist  [phone]          Follow-ups after your visit        Your next 10 appointments already scheduled     Aug 14, 2017  1:00 PM CDT   TELEMEDICINE with Almaz Christopher, [facility] ([facility]    [address] [place], [place] [address]  [facility] [address] [phone]           Note: this is not an onsite visit; there is no need to come to the facility.            Sep 11, 2017  8:40 AM CDT   LAB with LAB FIRST FLOOR [facility])    [address] [place] [address] Avenue [facility] [address] [phone]           Patient must bring picture ID.  Patient should be prepared to give a urine specimen  Please do not eat 10-12 hours before your appointment if you are coming in fasting for labs on lipids, cholesterol, or glucose (sugar).  Pregnant women should follow their Care Team instructions. Water with medications is okay. Do not drink coffee or other fluids.   If you have concerns about taking  your medications, please ask at office or if scheduling via [facility] Hospital[facility], send a message by clicking on Secure Messaging, Message Your Care Team.            Sep 11, 2017  9:00 AM CDT   PHYSICAL with Candy Trujillo MD   [facility])    [address] [place] [address] [phone]            Sep 27, 2017 10:00 AM CDT   Return Visit with Zayra  Nirmal Sanchez MD   [facility] ([facility])    [address] [place] [id number] [phone]            Oct 27, 2017  9:00 AM CDT   Return Visit with Sarahi Hernandez MD   [facility] ([facility])    [address] [facility] [address] [phone]              Who to contact     If you have questions or need follow up information about today's clinic visit or your schedule please contact [facility] directly at [phone].  Normal or non-critical lab and imaging results will be communicated to you by MyChart, letter or phone within 4 business days after the clinic has received the results. If you do not hear from us within 7 days, please contact the clinic through P[employer] or phone. If you have a critical or abnormal lab result, we will notify you by phone as soon as possible.  Submit refill requests through [employer] or call your pharmacy and they will forward the refill request to us. Please allow 3 business days for your refill to be completed.          Additional Information About Your Visit        MyChart Information     [employer] gives you secure access to your electronic health record. If you see a primary care provider, you can also send messages to your care team and make appointments. If you have questions, please call your primary care clinic.  If you do not have a primary care provider, please call [phone] and they will assist you.        Care EveryWhere ID     This is your Care EveryWhere ID. This could be used by other organizations to access your [facility] medical records  [id number]         Blood Pressure from Last 3 Encounters:   06/14/17 118/69   05/08/17 112/72   03/24/17 112/57    Weight from Last 3 Encounters:   06/14/17 115 lb 6.4 oz (52.3 kg)   05/08/17 118 lb 14.4 oz (53.9 kg)   03/24/17 121 lb (54.9 kg)              Today, you had the following     No orders  found for display         Today's Medication Changes          These changes are accurate as of: 7/24/17  1:48 PM.  If you have any questions, ask your nurse or doctor.               These medicines have changed or have updated prescriptions.        Dose/Directions    gabapentin 100 MG capsule   Commonly known as:  NEURONTIN   This may have changed:    - how much to take  - additional instructions   Used for:  Restless leg syndrome        Dose:  200 mg   Take 2 capsules (200 mg) by mouth daily (late afternoon)   Quantity:  180 capsule   Refills:  3       polyethylene glycol powder   Commonly known as:  MIRALAX   This may have changed:    - when to take this  - reasons to take this   Used for:  Chronic constipation        Dose:  1 capful   Take 17 g (1 capful) by mouth daily   Quantity:  510 g   Refills:  11                Primary Care Provider Office Phone # Fax #    Candy Trujillo [phone]       [facility] [address]  [facility] [address]        Equal Access to Services     [facility] CenterDIANA : Hadii percy coleman hadasho Soomaali, waaxda luqadaha, qaybta kaalmada adeegyada, waxay socorro hayeulalia alfredo . So [facility] [phone].    ATENCIÓN: Si habla español, tiene a crandall disposición servicios gratuitos de asistencia lingüística. [facility] [phone].    We comply with applicable federal civil rights laws and [place] laws. We do not discriminate on the basis of race, color, national origin, age, disability sex, sexual orientation or gender identity.            Thank you!     Thank you for choosing [facility]  for your care. Our goal is always to provide you with excellent care. Hearing back from our patients is one way we can continue to improve our services. Please take a few minutes to complete the written survey that you may receive in the mail after your visit with us. Thank you!             Your Updated Medication List - Protect others around you: Learn how to  safely use, store and throw away your medicines at www.disposemymeds.org.          This list is accurate as of: 7/24/17  1:48 PM.  Always use your most recent med list.                   Brand Name Dispense Instructions for use Diagnosis    albuterol 108 (90 BASE) MCG/ACT Inhaler    PROAIR HFA/PROVENTIL HFA/VENTOLIN HFA    1 Inhaler    Inhale 2 puffs into the lungs every 6 hours as needed for shortness of breath / dyspnea or wheezing    Asthma exacerbation, Influenza due to influenza virus, type B, Hospital discharge follow-up       brimonidine-timolol 0.2-0.5 % ophthalmic solution    COMBIGAN    10 mL    Place 1 drop into both eyes 2 times daily    Primary open angle glaucoma of both eyes, moderate stage       CALCIUM 600 + D 600-200 MG-UNIT Tabs   Generic drug:  Calcium Carb-Cholecalciferol      Take 1 tablet by mouth daily Vitamin D=5066 iu    Keratoconjunctivitis sicca, not specified as Sjogren's, left, Glaucoma       ciclopirox 0.77 % cream    LOPROX    270 g    Apply topically 2 times daily To left big toe twice daily.    Dermatophytosis of nail, Peripheral arterial disease (H)       cycloSPORINE 0.05 % ophthalmic emulsion    RESTASIS    1 Box    Place 1 drop into both eyes every 12 hours    Keratoconjunctivitis sicca, not specified as Sjogren's, left       DILT- MG 24 hr capsule   Generic drug:  diltiazem     90 capsule    TAKE 1 CAPSULE (120 MG) BY MOUTH DAILY    Essential hypertension with goal blood pressure less than 140/90       FLOVENT  MCG/ACT Inhaler   Generic drug:  fluticasone     36 g    INHALE 2 PUFFS INTO THE LUNGS 2 TIMES DAILY    Simple chronic bronchitis (H)       FLUoxetine 20 MG capsule    PROzac    90 capsule    Take 1 capsule (20 mg) by mouth daily    Depression with anxiety       fluticasone 50 MCG/ACT spray    FLONASE    1 Bottle    Spray 2 sprays into both nostrils daily    Postnasal drip       gabapentin 100 MG capsule    NEURONTIN    180 capsule    Take 2 capsules (200  mg) by mouth daily (late afternoon)    Restless leg syndrome       GENTEAL SEVERE 0.3 % Gel ophthalmic gel   Generic drug:  hypromellose      Place 0.1 g (2 drops) into both eyes At Bedtime        polyethylene glycol powder    MIRALAX    510 g    Take 17 g (1 capful) by mouth daily    Chronic constipation       pravastatin 40 MG tablet    PRAVACHOL    45 tablet    TAKE 1/2 TABLET EVERY DAY    Hyperlipidemia LDL goal <100       SYSTANE 0.4-0.3 % Soln ophthalmic solution   Generic drug:  polyethylene glycol 0.4%- propylene glycol 0.3%      Place 1 drop into both eyes 3 times daily as needed for dry eyes    Keratoconjunctivitis sicca, not specified as Sjogren's, left, Glaucoma

## 2017-07-24 NOTE — PATIENT INSTRUCTIONS
Try melatonin 3-5mg at night for sleep.  I will call you in 1 month to see how it is working.    Almaz Christopher, Pharm.D, [facility]  Medication Therapy Management Pharmacist  [phone]

## 2017-07-24 NOTE — PROGRESS NOTES
SUBJECTIVE/OBJECTIVE:                Nancy Benz is a 89 year old female called for a follow-up visit for Medication Therapy Management.  She was referred to me from Candy Trujillo .     Chief Complaint: Follow up from our visit on 7/10/2017.  Insomnia  Tobacco: No tobacco use   Alcohol: 7-9 beverages / week    Medication Adherence: no issues reported    Insomnia: Current medications include: doxepin 3mg at bedtime. Patient has stopped taking because it wasn't working to help her sleep and felt she was having some side effects. Patient felt she was having trouble going to the bathroom. Patient has come to accept this as part of old age. Patient has tried melatonin in the past and felt like it quit working. Patient would be willing to try again.    Patient states she has no other concerns with her medications.    Current labs include:  BP Readings from Last 3 Encounters:   06/14/17 118/69   05/08/17 112/72   03/24/17 112/57     Today's Vitals: There were no vitals taken for this visit.  No results found for: A1C.  Lab Results   Component Value Date    CHOL 212 03/06/2017     Lab Results   Component Value Date    TRIG 79 03/06/2017     Lab Results   Component Value Date     03/06/2017     Lab Results   Component Value Date    LDL 92 03/06/2017       Liver Function Studies -   Recent Labs   Lab Test  05/08/17   1531   PROTTOTAL  6.7*   ALBUMIN  3.2*   BILITOTAL  0.4   ALKPHOS  51   AST  16   ALT  28       Lab Results   Component Value Date    UCRR 55 11/21/2016    MICROL 8 08/12/2016    UMALCR 9.70 08/12/2016       Last Basic Metabolic Panel:  Lab Results   Component Value Date     05/08/2017      Lab Results   Component Value Date    POTASSIUM 4.7 05/08/2017     Lab Results   Component Value Date    CHLORIDE 93 05/08/2017     Lab Results   Component Value Date    BUN 17 05/08/2017     Lab Results   Component Value Date    CR 0.70 05/08/2017     GFR Estimate   Date Value Ref Range Status   05/08/2017 78 >60  mL/min/1.7m2 Final     Comment:     Non  GFR Calc   11/14/2016 74 >60 mL/min/1.7m2 Final     Comment:     Non  GFR Calc   08/01/2016 78 >60 mL/min/1.7m2 Final     Comment:     Non  GFR Calc     GFR Estimate If Black   Date Value Ref Range Status   05/08/2017 >90   GFR Calc   >60 mL/min/1.7m2 Final   11/14/2016 90 >60 mL/min/1.7m2 Final     Comment:      GFR Calc   08/01/2016 >90   GFR Calc   >60 mL/min/1.7m2 Final     TSH   Date Value Ref Range Status   11/14/2016 1.17 0.40 - 4.00 mU/L Final   ]    Most Recent Immunizations   Administered Date(s) Administered     Influenza (High Dose) 3 valent vaccine 09/22/2015     Pneumococcal (PCV 13) 07/05/2016     Pneumococcal 23 valent 10/19/2010     TD (ADULT, 7+) 09/01/2000     Tdap (Adacel,Boostrix) 09/22/2015         ASSESSMENT:              Current medications were reviewed today as discussed above.      Medication Adherence: no issues identified    Insomnia: Needs Improvement. Patient may benefit from retrial of melatonin and giving it some time to work. If melatonin is not effective patient could try magnesium glycinate 250mg daily.    PLAN:                  Recommend melatonin 3-5mg at bedtime.    I spent 15 minutes with this patient today.  All changes were made via collaborative practice agreement with Candy Trujillo. A copy of the visit note was provided to the patient's primary care provider.     Will follow up in 4 weeks.    The patient was sent via [employer] a summary of these recommendations as an after visit summary.    Almaz Christopher, Pharm.D, BCACP  Medication Therapy Management Pharmacist

## 2017-08-09 ENCOUNTER — TELEPHONE (OUTPATIENT)
Dept: OPHTHALMOLOGY | Facility: CLINIC | Age: 82
End: 2017-08-09

## 2017-08-09 DIAGNOSIS — H40.1132 PRIMARY OPEN ANGLE GLAUCOMA OF BOTH EYES, MODERATE STAGE: ICD-10-CM

## 2017-08-09 RX ORDER — BRIMONIDINE TARTRATE AND TIMOLOL MALEATE 2; 5 MG/ML; MG/ML
1 SOLUTION OPHTHALMIC 2 TIMES DAILY
Qty: 10 ML | Refills: 3 | Status: SHIPPED | OUTPATIENT
Start: 2017-08-09 | End: 2018-01-18

## 2017-08-09 NOTE — TELEPHONE ENCOUNTER
[facility] Call Center    Phone Message    Name of Caller: Nancy    Phone Number: Home number on file [phone] (home)    Best time to return call: Any    May a detailed message be left on voicemail: yes    Relation to patient: Self    Reason for Call: Other: Patient would like a refill on brimonidine-timolol (COMBIGAN) 0.2-0.5 % ophthalmic solution [63568] (Order [id number]. She would like it sent to the [facility] pharmacy. Please advise.      Action Taken: Message routed to:  Adult Clinics: Eye p [phone]

## 2017-08-09 NOTE — TELEPHONE ENCOUNTER
Provider: Amy    Faxed refill request from: PT    Medication request: Combigan  Prescription written: 3/7/17  Last filled: ?  Last Qty: 10ml    Pt's last office visit: 4/3/17  Next scheduled office visit: 10/3/17  Ok to fill. W/ enough refills to next OV  Janeth LEWIS COA. OSC

## 2017-08-29 ENCOUNTER — TELEPHONE (OUTPATIENT)
Dept: PEDIATRICS | Facility: CLINIC | Age: 82
End: 2017-08-29

## 2017-08-29 DIAGNOSIS — J41.0 SIMPLE CHRONIC BRONCHITIS (H): ICD-10-CM

## 2017-08-31 RX ORDER — FLUTICASONE PROPIONATE 220 UG/1
AEROSOL, METERED RESPIRATORY (INHALATION)
Qty: 36 G | Refills: 1 | Status: SHIPPED | OUTPATIENT
Start: 2017-08-31 | End: 2018-01-23

## 2017-08-31 NOTE — TELEPHONE ENCOUNTER
Routing refill request to provider for review/approval because:  Labs not current:  No ACT on file    FLOVENT  MCG/ACT Inhaler  Last Written Prescription Date: 3/2/2017  Last Fill Quantity: 36 g, # refills: 1    Last Office Visit with FMVANDANA, MANASA or [facility] prescribing provider:  6/14/2017   Future Office Visit:    Next 5 appointments (look out 90 days)     Sep 11, 2017  9:00 AM CDT   PHYSICAL with Candy Trujillo MD   [facility])    [address] [place] [id number]   [phone]            Sep 27, 2017 10:00 AM CDT   Return Visit with Zayra Sanchez MD   [facility])    [address] [place] [id number]   [phone]            Oct 03, 2017  9:45 AM CDT   Return Visit with Lee Reyes MD, [facility] NURSE ONLY   [facility])    [address] [place] [id number]   [phone]            Oct 24, 2017 10:00 AM CDT   Return Visit with RANDI Natarajan MD   [facility])    [address] [place] [phone]   [phone]            Oct 27, 2017  9:00 AM CDT   Return Visit with Sarahi Hernandez MD   [facility])    [address] [place] [phone]   [phone]                   Date of Last Asthma Action Plan Letter:   There are no preventive care reminders to display for this patient.   Asthma Control Test: No flowsheet data found.    Date of Last Spirometry Test:   No results found for this or any previous visit.    Evelin Esqueda RN

## 2017-09-01 ENCOUNTER — TELEPHONE (OUTPATIENT)
Dept: OPHTHALMOLOGY | Facility: CLINIC | Age: 82
End: 2017-09-01

## 2017-09-01 DIAGNOSIS — H40.1130 PRIMARY OPEN ANGLE GLAUCOMA OF BOTH EYES, UNSPECIFIED GLAUCOMA STAGE: Primary | ICD-10-CM

## 2017-09-01 DIAGNOSIS — H16.222: ICD-10-CM

## 2017-09-01 NOTE — TELEPHONE ENCOUNTER
[facility] Call Center    Phone Message    Name of Caller: chela    Phone Number: Home number on file [phone] (home)    Best time to return call: any    May a detailed message be left on voicemail: yes    Relation to patient: Self    Reason for Call: Other: lumigan eye drops, patient wants refills but don't see on medication list. Ordered by zachary.

## 2017-09-01 NOTE — TELEPHONE ENCOUNTER
Phone call to pt who states she would like Lumigan prescription sent to [facility] pharmacy.  Prescription sent as requested.  Oxana Brady RN

## 2017-09-01 NOTE — TELEPHONE ENCOUNTER
Patient notified RX sent to [facility] yesterday.     Disp Refills Start End DOUGLAS      FLOVENT  MCG/ACT Inhaler 36 g 1 8/31/2017  No     Sig: INHALE 2 PUFFS INTO THE LUNGS 2 TIMES DAILY     Class: E-Prescribe     Order: [id number]     E-Prescribing Status: Receipt confirmed by pharmacy (8/31/2017  4:43 PM CDT)       Printout Tracking      External Result Report       Pharmacy      [facility] - [place] [address] MANJEET Prater RN,   [facility]

## 2017-09-01 NOTE — TELEPHONE ENCOUNTER
[facility] Call Center    Phone Message    Name of Caller: Nancy    Phone Number: Home number on file [phone] (home)    Best time to return call: ANY    May a detailed message be left on voicemail: yes    Relation to patient: Self    Reason for Call: Other: Patient is calling to check status on flovent     Action Taken: Message routed to:  Primary Care p [phone]

## 2017-09-07 ENCOUNTER — DOCUMENTATION ONLY (OUTPATIENT)
Dept: LAB | Facility: CLINIC | Age: 82
End: 2017-09-07

## 2017-09-07 DIAGNOSIS — E78.5 HYPERLIPIDEMIA LDL GOAL <130: Primary | ICD-10-CM

## 2017-09-07 NOTE — PROGRESS NOTES
Pending lab orders for 9/11/2017. Please review, sign, and close encounter. Evelin Esqueda RN    FYI - I added and ALT, BMP, and Lipid as there were some results out of range at last lab. If you find these to be unnecessary, can remove them.

## 2017-09-11 ENCOUNTER — TELEPHONE (OUTPATIENT)
Dept: OPHTHALMOLOGY | Facility: CLINIC | Age: 82
End: 2017-09-11

## 2017-09-11 ENCOUNTER — OFFICE VISIT (OUTPATIENT)
Dept: PEDIATRICS | Facility: CLINIC | Age: 82
End: 2017-09-11
Payer: COMMERCIAL

## 2017-09-11 VITALS
BODY MASS INDEX: 23.44 KG/M2 | OXYGEN SATURATION: 96 % | WEIGHT: 120 LBS | TEMPERATURE: 96.5 F | SYSTOLIC BLOOD PRESSURE: 110 MMHG | HEART RATE: 52 BPM | DIASTOLIC BLOOD PRESSURE: 68 MMHG

## 2017-09-11 DIAGNOSIS — F51.04 CHRONIC INSOMNIA: Primary | ICD-10-CM

## 2017-09-11 DIAGNOSIS — I10 ESSENTIAL HYPERTENSION: ICD-10-CM

## 2017-09-11 DIAGNOSIS — E78.5 HYPERLIPIDEMIA LDL GOAL <130: ICD-10-CM

## 2017-09-11 DIAGNOSIS — N39.41 URGENCY INCONTINENCE: ICD-10-CM

## 2017-09-11 DIAGNOSIS — R73.03 PREDIABETES: ICD-10-CM

## 2017-09-11 LAB
ALT SERPL W P-5'-P-CCNC: 18 U/L (ref 0–50)
ANION GAP SERPL CALCULATED.3IONS-SCNC: 4 MMOL/L (ref 3–14)
BUN SERPL-MCNC: 21 MG/DL (ref 7–30)
CALCIUM SERPL-MCNC: 9.5 MG/DL (ref 8.5–10.1)
CHLORIDE SERPL-SCNC: 97 MMOL/L (ref 94–109)
CHOLEST SERPL-MCNC: 175 MG/DL
CO2 SERPL-SCNC: 33 MMOL/L (ref 20–32)
CREAT SERPL-MCNC: 0.76 MG/DL (ref 0.52–1.04)
CREAT UR-MCNC: 113 MG/DL
GFR SERPL CREATININE-BSD FRML MDRD: 71 ML/MIN/1.7M2
GLUCOSE SERPL-MCNC: 100 MG/DL (ref 70–99)
HDLC SERPL-MCNC: 93 MG/DL
LDLC SERPL CALC-MCNC: 64 MG/DL
MICROALBUMIN UR-MCNC: 27 MG/L
MICROALBUMIN/CREAT UR: 24.16 MG/G CR (ref 0–25)
NONHDLC SERPL-MCNC: 82 MG/DL
POTASSIUM SERPL-SCNC: 4.1 MMOL/L (ref 3.4–5.3)
SODIUM SERPL-SCNC: 134 MMOL/L (ref 133–144)
TRIGL SERPL-MCNC: 88 MG/DL

## 2017-09-11 PROCEDURE — 82043 UR ALBUMIN QUANTITATIVE: CPT | Performed by: INTERNAL MEDICINE

## 2017-09-11 PROCEDURE — 80048 BASIC METABOLIC PNL TOTAL CA: CPT | Performed by: INTERNAL MEDICINE

## 2017-09-11 PROCEDURE — 80061 LIPID PANEL: CPT | Performed by: INTERNAL MEDICINE

## 2017-09-11 PROCEDURE — 36415 COLL VENOUS BLD VENIPUNCTURE: CPT | Performed by: INTERNAL MEDICINE

## 2017-09-11 PROCEDURE — 99213 OFFICE O/P EST LOW 20 MIN: CPT | Performed by: INTERNAL MEDICINE

## 2017-09-11 PROCEDURE — 84460 ALANINE AMINO (ALT) (SGPT): CPT | Performed by: INTERNAL MEDICINE

## 2017-09-11 RX ORDER — OXYBUTYNIN CHLORIDE 5 MG/1
2.5-5 TABLET ORAL AT BEDTIME
Qty: 30 TABLET | Refills: 0 | Status: SHIPPED | OUTPATIENT
Start: 2017-09-11 | End: 2018-01-05

## 2017-09-11 NOTE — MR AVS SNAPSHOT
After Visit Summary   9/11/2017    Nancy Benz    MRN: [id number]           Patient Information     Date Of Birth          8/5/1922        Visit Information        Provider Department      9/11/2017 9:00 AM Candy Trujillo MD [facility]        Today's Diagnoses     Urinary frequency    -  1      Care Instructions    Make appointment(s) for:   -- wellness visit in 6 months.         Medication(s) prescribed today:    Orders Placed This Encounter   Medications     oxybutynin (DITROPAN) 5 MG tablet     Sig: Take 0.5-1 tablets (2.5-5 mg) by mouth At Bedtime     Dispense:  30 tablet     Refill:  0                   Follow-ups after your visit        Your next 10 appointments already scheduled     Sep 27, 2017 10:00 AM CDT   Return Visit with Zayra Sanchez MD   [facility])    [address] [place] [id number] [phone]            Oct 03, 2017  9:45 AM CDT   Return Visit with Lee Reyes MD, [facility] NURSE ONLY   [facility] ([facility])    [address] [place] [phone]            Oct 24, 2017 10:00 AM CDT   Return Visit with RANDI Natarajan MD   [facility] ([facility])    [address] [place] [phone]            Oct 27, 2017  9:00 AM CDT   Return Visit with Sarahi Hernandez MD   [facility])    [address] [place] [phone]              Who to contact     If you have questions or need follow up information about today's clinic visit or your schedule please contact [facility] directly at [phone].  Normal or non-critical lab and imaging results will be communicated to you by MyChart, letter or phone within 4 business days after the clinic has received the results. If  you do not hear from us within 7 days, please contact the clinic through [employer] or phone. If you have a critical or abnormal lab result, we will notify you by phone as soon as possible.  Submit refill requests through [employer] or call your pharmacy and they will forward the refill request to us. Please allow 3 business days for your refill to be completed.          Additional Information About Your Visit        [employer] Information     [employer] gives you secure access to your electronic health record. If you see a primary care provider, you can also send messages to your care team and make appointments. If you have questions, please call your primary care clinic.  If you do not have a primary care provider, please call [phone] and they will assist you.      [employer] is an electronic gateway that provides easy, online access to your medical records. With [employer], you can request a clinic appointment, read your test results, renew a prescription or communicate with your care team.     To access your existing account, please contact your [facility] or call [phone] for assistance.        Care EveryWhere ID     This is your Care EveryWhere ID. This could be used by other organizations to access your [place] medical records  [id number]        Your Vitals Were     Pulse Temperature Pulse Oximetry BMI (Body Mass Index)          52 96.5  F (35.8  C) (Temporal) 96% 23.44 kg/m2         Blood Pressure from Last 3 Encounters:   09/11/17 110/68   06/14/17 118/69   05/08/17 112/72    Weight from Last 3 Encounters:   09/11/17 120 lb (54.4 kg)   06/14/17 115 lb 6.4 oz (52.3 kg)   05/08/17 118 lb 14.4 oz (53.9 kg)              Today, you had the following     No orders found for display         Today's Medication Changes          These changes are accurate as of: 9/11/17 10:03 AM.  If you have any questions, ask your nurse or doctor.               Start taking these medicines.         Dose/Directions    oxybutynin 5 MG tablet   Commonly known as:  DITROPAN   Used for:  Urinary frequency   Started by:  Candy Trujillo MD        Dose:  2.5-5 mg   Take 0.5-1 tablets (2.5-5 mg) by mouth At Bedtime   Quantity:  30 tablet   Refills:  0         These medicines have changed or have updated prescriptions.        Dose/Directions    gabapentin 100 MG capsule   Commonly known as:  NEURONTIN   This may have changed:    - how much to take  - additional instructions   Used for:  Restless leg syndrome        Dose:  200 mg   Take 2 capsules (200 mg) by mouth daily (late afternoon)   Quantity:  180 capsule   Refills:  3       polyethylene glycol powder   Commonly known as:  MIRALAX   This may have changed:    - when to take this  - reasons to take this   Used for:  Chronic constipation        Dose:  1 capful   Take 17 g (1 capful) by mouth daily   Quantity:  510 g   Refills:  11            Where to get your medicines      These medications were sent to [facility] - [place], [place] - [address], [address]  [address], [facility] [address]     Phone:  [phone]     oxybutynin 5 MG tablet                Primary Care Provider Office Phone # Fax #    Candy Trujillo [phone]       [address]  [facility] [address]        Equal Access to Services     ASHUTOSH FRANCO AH: Hadii percy coleman hadasho Soomaali, waaxda luqadaha, qaybta kaalmada adeegyada, clark quintanilla hayeulalia alfredo . So [facility] [phone].    ATENCIÓN: Si habla español, tiene a crandall disposición servicios gratuitos de asistencia lingüística. Mark al [phone].    We comply with applicable federal civil rights laws and [place] laws. We do not discriminate on the basis of race, color, national origin, age, disability sex, sexual orientation or gender identity.            Thank you!     Thank you for choosing [facility]  for your care. Our goal is always to provide you with  excellent care. Hearing back from our patients is one way we can continue to improve our services. Please take a few minutes to complete the written survey that you may receive in the mail after your visit with us. Thank you!             Your Updated Medication List - Protect others around you: Learn how to safely use, store and throw away your medicines at www.disposemymeds.org.          This list is accurate as of: 9/11/17 10:03 AM.  Always use your most recent med list.                   Brand Name Dispense Instructions for use Diagnosis    albuterol 108 (90 BASE) MCG/ACT Inhaler    PROAIR HFA/PROVENTIL HFA/VENTOLIN HFA    1 Inhaler    Inhale 2 puffs into the lungs every 6 hours as needed for shortness of breath / dyspnea or wheezing    Asthma exacerbation, Influenza due to influenza virus, type B, Hospital discharge follow-up       bimatoprost 0.01 % Soln    LUMIGAN    3 Bottle    Place 1 drop into both eyes At Bedtime    Primary open angle glaucoma of both eyes, unspecified glaucoma stage       brimonidine-timolol 0.2-0.5 % ophthalmic solution    COMBIGAN    10 mL    Place 1 drop into both eyes 2 times daily    Primary open angle glaucoma of both eyes, moderate stage       CALCIUM 600 + D 600-200 MG-UNIT Tabs   Generic drug:  Calcium Carb-Cholecalciferol      Take 1 tablet by mouth daily Vitamin S=6019 iu    Keratoconjunctivitis sicca, not specified as Sjogren's, left, Glaucoma       ciclopirox 0.77 % cream    LOPROX    270 g    Apply topically 2 times daily To left big toe twice daily.    Dermatophytosis of nail, Peripheral arterial disease (H)       cycloSPORINE 0.05 % ophthalmic emulsion    RESTASIS    1 Box    Place 1 drop into both eyes every 12 hours    Keratoconjunctivitis sicca, not specified as Sjogren's, left       DILT- MG 24 hr capsule   Generic drug:  diltiazem     90 capsule    TAKE 1 CAPSULE (120 MG) BY MOUTH DAILY    Essential hypertension with goal blood pressure less than 140/90        FLOVENT  MCG/ACT Inhaler   Generic drug:  fluticasone     36 g    INHALE 2 PUFFS INTO THE LUNGS 2 TIMES DAILY    Simple chronic bronchitis (H)       FLUoxetine 20 MG capsule    PROzac    90 capsule    Take 1 capsule (20 mg) by mouth daily    Depression with anxiety       gabapentin 100 MG capsule    NEURONTIN    180 capsule    Take 2 capsules (200 mg) by mouth daily (late afternoon)    Restless leg syndrome       GENTEAL SEVERE 0.3 % Gel ophthalmic gel   Generic drug:  hypromellose      Place 0.1 g (2 drops) into both eyes At Bedtime        oxybutynin 5 MG tablet    DITROPAN    30 tablet    Take 0.5-1 tablets (2.5-5 mg) by mouth At Bedtime    Urinary frequency       polyethylene glycol powder    MIRALAX    510 g    Take 17 g (1 capful) by mouth daily    Chronic constipation       pravastatin 40 MG tablet    PRAVACHOL    45 tablet    TAKE 1/2 TABLET EVERY DAY    Hyperlipidemia LDL goal <100       SYSTANE 0.4-0.3 % Soln ophthalmic solution   Generic drug:  polyethylene glycol 0.4%- propylene glycol 0.3%      Place 1 drop into both eyes 3 times daily as needed for dry eyes    Keratoconjunctivitis sicca, not specified as Sjogren's, left, Glaucoma

## 2017-09-11 NOTE — PROGRESS NOTES
Dear Nancy,   Here are your recent results.   -- excellent results. All normal other than very borderline glucose reading. No change in your current medications.     Please call or Mychart to our office if you have further questions.     Candy Trujillo MD

## 2017-09-11 NOTE — PROGRESS NOTES
SUBJECTIVE:   Nancy Benz is a 89 year old female who presents to clinic today for the following health issues:      Follow up, also Insomnia      Duration: Several years    Description  Frequency of insomnia:  nightly  Time to fall asleep: Depends on the night  Middle of night awakening:  nightly    Accompanying signs and symptoms:  fatigue    History  Similar episodes in past:  YES  Previous evaluation/sleep study:  no     Precipitating or alleviating factors:  New stressful situation: no   Caffeine intake after lunchtime: no   OTC decongestants: no   Any new medications: no     Therapies tried and outcome: Sleep liquid?    Doxepin works but she doesn't want to take it daily for concern of dependence. Previously she had tried other medications such as Ambien and trazodone without benefit.     overall she reports doing well. She mentioned a couple of chronic issues, urinary incontinence with nocturia. This is not new. Has been going on for years. She is wearing depends. She also has chronic dizziness, previously has seen several neurologists. Now she walks with a walker, feels she is doing fine with that. She personal thinks that this is a return of the motion sickness she used to have when she was a child.       Current Outpatient Prescriptions on File Prior to Visit:  bimatoprost (LUMIGAN) 0.01 % SOLN Place 1 drop into both eyes At Bedtime   FLOVENT  MCG/ACT Inhaler INHALE 2 PUFFS INTO THE LUNGS 2 TIMES DAILY   brimonidine-timolol (COMBIGAN) 0.2-0.5 % ophthalmic solution Place 1 drop into both eyes 2 times daily   pravastatin (PRAVACHOL) 40 MG tablet TAKE 1/2 TABLET EVERY DAY   DILT- MG 24 hr capsule TAKE 1 CAPSULE (120 MG) BY MOUTH DAILY   gabapentin (NEURONTIN) 100 MG capsule Take 2 capsules (200 mg) by mouth daily (late afternoon) (Patient taking differently: Take 300 mg by mouth daily (late afternoon))   FLUoxetine (PROZAC) 20 MG capsule Take 1 capsule (20 mg) by mouth daily   hypromellose  (GENTEAL) ophthalmic gel 0.3% Place 0.1 g (2 drops) into both eyes At Bedtime   cycloSPORINE (RESTASIS) 0.05 % ophthalmic emulsion Place 1 drop into both eyes every 12 hours   ciclopirox (LOPROX) 0.77 % cream Apply topically 2 times daily To left big toe twice daily.   polyethylene glycol 0.4%- propylene glycol 0.3% (SYSTANE) 0.4-0.3 % SOLN ophthalmic solution Place 1 drop into both eyes 3 times daily as needed for dry eyes   Calcium Carb-Cholecalciferol (CALCIUM 600 + D) 600-200 MG-UNIT TABS Take 1 tablet by mouth daily Vitamin J=9133 iu   albuterol (PROAIR HFA/PROVENTIL HFA/VENTOLIN HFA) 108 (90 BASE) MCG/ACT Inhaler Inhale 2 puffs into the lungs every 6 hours as needed for shortness of breath / dyspnea or wheezing   polyethylene glycol (MIRALAX) powder Take 17 g (1 capful) by mouth daily (Patient taking differently: Take 1 capful by mouth daily as needed )     No current facility-administered medications on file prior to visit.       Problem list, Medication list, Allergies, and Medical/Social/Surgical histories reviewed in [facility] and updated as appropriate.    OBJECTIVE:                                                    /68  Pulse 52  Temp 96.5  F (35.8  C) (Temporal)  Wt 120 lb (54.4 kg)  SpO2 96%  BMI 23.44 kg/m2    GEN: healthy, alert and no distress  HEENT: unremarkable.  Neck:     supple, no thyromegaly, no cervical lymphadenopathy.   CONCHIS:  CTA B/L, no wheezing or crackles.  CV:  RRR no murmur.  Intact distal pulses, good cap refill.  Abd: soft, normal active bowel sounds, non tender, non distended. No mass or organomegaly.   Ext:  no cyanosis, clubbing or edema.         Diagnostic test results:  Results for orders placed or performed in visit on 09/11/17 (from the past 24 hour(s))   **Lipid panel reflex to direct LDL FUTURE 6mo   Result Value Ref Range    Cholesterol 175 <200 mg/dL    Triglycerides 88 <150 mg/dL    HDL Cholesterol 93 >49 mg/dL    LDL Cholesterol Calculated 64 <100 mg/dL    Non HDL  Cholesterol 82 <130 mg/dL   **ALT FUTURE 6mo   Result Value Ref Range    ALT 18 0 - 50 U/L   **Basic metabolic panel FUTURE 6mo   Result Value Ref Range    Sodium 134 133 - 144 mmol/L    Potassium 4.1 3.4 - 5.3 mmol/L    Chloride 97 94 - 109 mmol/L    Carbon Dioxide 33 (H) 20 - 32 mmol/L    Anion Gap 4 3 - 14 mmol/L    Glucose 100 (H) 70 - 99 mg/dL    Urea Nitrogen 21 7 - 30 mg/dL    Creatinine 0.76 0.52 - 1.04 mg/dL    GFR Estimate 71 >60 mL/min/1.7m2    GFR Estimate If Black 86 >60 mL/min/1.7m2    Calcium 9.5 8.5 - 10.1 mg/dL   Albumin Random Urine Quantitative with Creat Ratio   Result Value Ref Range    Creatinine Urine 113 mg/dL    Albumin Urine mg/L 27 mg/L    Albumin Urine mg/g Cr 24.16 0 - 25 mg/g Cr          ASSESSMENT/PLAN:                                                      89 year old female with the following diagnoses and treatment plan:      ICD-10-CM    1. Chronic insomnia F51.04    2. Hyperlipidemia LDL goal <130 E78.5    3. Urgency incontinence N39.41 oxybutynin (DITROPAN) 5 MG tablet   4. Prediabetes R73.03    5. Essential hypertension I10        Stable chronic health issues. Essure patient that it is okay to take doxepin for sleep at nighttime on a daily basis if this helps. On nights she has no trouble, she does not have to take this.    Trial of low-dose oxybutynin at bedtime to help reduce nocturia. This will help with quality of sleep.    Patient plans to get flu vaccine at local pharmacy.    Return in 6 months for chronic disease review.    Will call or return to clinic if worsening or symptoms not improving as discussed.  See Patient Instructions.        Candy Trujillo MD-PhD  [facility]    (Note: Chart documentation was done in part with Dragon Voice Recognition software. Although reviewed after completion, some word and grammatical errors may remain.)

## 2017-09-11 NOTE — TELEPHONE ENCOUNTER
[facility] Call Center    Phone Message    Name of Caller: Nancy    Phone Number: Home number on file [phone] (home) or Cell number on file:    Telephone Information:   Mobile none       Best time to return call: Any    May a detailed message be left on voicemail: yes    Relation to patient: Self    Reason for Call: Medication Refill Request    Has the patient contacted the pharmacy for the refill? Yes   Name of medication being requested: bimatoprost (LUMIGAN) 0.01 % SOLN [[id number]] (Order [id number])     Provider who prescribed the medication: Dr. Reyes   Pharmacy: [facility]   Date medication is needed: asap- patient will be out tomorrow           Action Taken: Message routed to:  Adult Clinics: Eye p [phone]

## 2017-09-11 NOTE — PATIENT INSTRUCTIONS
Make appointment(s) for:   -- wellness visit in 6 months.         Medication(s) prescribed today:    Orders Placed This Encounter   Medications     oxybutynin (DITROPAN) 5 MG tablet     Sig: Take 0.5-1 tablets (2.5-5 mg) by mouth At Bedtime     Dispense:  30 tablet     Refill:  0

## 2017-09-11 NOTE — NURSING NOTE
Chief Complaint   Patient presents with     RECHECK       Initial /68  Pulse 52  Temp 96.5  F (35.8  C) (Temporal)  Wt 120 lb (54.4 kg)  SpO2 96%  BMI 23.44 kg/m2 Estimated body mass index is 23.44 kg/(m^2) as calculated from the following:    Height as of 6/14/17: 5' (1.524 m).    Weight as of this encounter: 120 lb (54.4 kg).  Medication Reconciliation: complete

## 2017-09-26 ENCOUNTER — TELEPHONE (OUTPATIENT)
Dept: PEDIATRICS | Facility: CLINIC | Age: 82
End: 2017-09-26

## 2017-09-26 DIAGNOSIS — F51.04 CHRONIC INSOMNIA: ICD-10-CM

## 2017-09-26 NOTE — TELEPHONE ENCOUNTER
Hello,  last fill date:06-  Last quantity:30  For doxepin 10mg/ml -- 0.3mls qhs    Thank You,  Elba Pat  Pharmacy Technician  [facility]  [phone]

## 2017-09-27 RX ORDER — DOXEPIN HYDROCHLORIDE 10 MG/ML
3 SOLUTION ORAL AT BEDTIME
Qty: 10 ML | Refills: 3 | Status: SHIPPED | OUTPATIENT
Start: 2017-09-27 | End: 2018-01-18

## 2017-10-03 ENCOUNTER — OFFICE VISIT (OUTPATIENT)
Dept: OPHTHALMOLOGY | Facility: CLINIC | Age: 82
End: 2017-10-03
Payer: COMMERCIAL

## 2017-10-03 DIAGNOSIS — H40.1132 PRIMARY OPEN ANGLE GLAUCOMA OF BOTH EYES, MODERATE STAGE: Primary | ICD-10-CM

## 2017-10-03 PROCEDURE — 92133 CPTRZD OPH DX IMG PST SGM ON: CPT | Performed by: OPHTHALMOLOGY

## 2017-10-03 PROCEDURE — 92012 INTRM OPH EXAM EST PATIENT: CPT | Performed by: OPHTHALMOLOGY

## 2017-10-03 ASSESSMENT — TONOMETRY
OD_IOP_MMHG: 18
OS_IOP_MMHG: 14
IOP_METHOD: TONOPEN

## 2017-10-03 ASSESSMENT — VISUAL ACUITY
METHOD: SNELLEN - LINEAR
OD_CC: CF @ 3'
OS_CC: 20/60

## 2017-10-03 ASSESSMENT — SLIT LAMP EXAM - LIDS
COMMENTS: NORMAL
COMMENTS: NORMAL

## 2017-10-03 ASSESSMENT — EXTERNAL EXAM - RIGHT EYE: OD_EXAM: S/P PTOSIS REPAIR

## 2017-10-03 ASSESSMENT — EXTERNAL EXAM - LEFT EYE: OS_EXAM: S/P PTOSIS REPAIR

## 2017-10-03 NOTE — PATIENT INSTRUCTIONS
Continue using your lumigan and combigan.   Use restasis and systane for your dry eye.  Practice good lid hygeine to help with the comfort and cleanliness of your lids and eye lashes    Good Lid Hygeine  To treat the problem, you need to keep your eyelids clean. Warm compresses help reduce redness and swelling and keep the eyelids clean. You may also need to clean your eyelids when you wake up.    To apply a warm compress:  1. Wash your hands with soap and warm water.  2. Wet a clean washcloth with warm water. Then wring it out.  3. Close your eyes and place the washcloth over your eyelids for 3 5 minutes. This helps loosen scales or crusts.  4. Wet the washcloth again as often as needed to keep it warm.  Repeat 2 or more times a day. Use a clean washcloth each time.    To use an eyelid scrub:  1. Wash your hands with soap and warm water.  2. Use a ready-made eyelid scrub. Or, mix 3 drops of baby shampoo in 1/4 cup of warm water.  3. Dip a lint-free pad, cotton swab, or clean washcloth in the scrub.  4. Close one eye and gently scrub the base of the eyelid.  5. Rinse the lid in cool water and dry with a clean towel.  6. Repeat on the other eye.

## 2017-10-03 NOTE — PROGRESS NOTES
Assessment & Plan   Nancy Benz is a 89 year old female who presents with:   Review of systems for the eyes was negative other than the pertinent positives and negatives noted in the HPI.  History is obtained from the patient and .     Primary open angle glaucoma of both eyes, moderate stage  - Stable IOP, Continue lumigan and combigan  - OCT Optic Nerve RNFL Optovue OU (both eyes)    Return in 6 mo for dfe, oct (no hvf)    Documentation for today's encounter was performed by Janeth Carlisle COA. OSC. Acting as a scribe in my presence. I have reviewed and verified that it is an accurate recording of today's encounter.    Attending Physician Attestation:  Complete documentation of historical and exam elements from today's encounter can be found in the full encounter summary report (not reduplicated in this progress note).  I personally obtained the chief complaint(s) and history of present illness.  I confirmed and edited as necessary the review of systems, past medical/surgical history, family history, social history, and examination findings as documented by others; and I examined the patient myself.  I personally reviewed the relevant tests, images, and reports as documented above.  I formulated and edited as necessary the assessment and plan and discussed the findings and management plan with the patient and family. - Lee Reyes MD

## 2017-10-03 NOTE — MR AVS SNAPSHOT
After Visit Summary   10/3/2017    Nancy Benz    MRN: [id number]           Patient Information     Date Of Birth          8/5/1922        Visit Information        Provider Department      10/3/2017 9:45 AM Lee Reyes MD; MG OPHTH NURSE ONLY [facility]        Today's Diagnoses     Primary open angle glaucoma of both eyes, moderate stage    -  1      Care Instructions    Continue using your lumigan and combigan.   Use restasis and systane for your dry eye.  Practice good lid hygeine to help with the comfort and cleanliness of your lids and eye lashes    Good Lid Hygeine  To treat the problem, you need to keep your eyelids clean. Warm compresses help reduce redness and swelling and keep the eyelids clean. You may also need to clean your eyelids when you wake up.    To apply a warm compress:  1. Wash your hands with soap and warm water.  2. Wet a clean washcloth with warm water. Then wring it out.  3. Close your eyes and place the washcloth over your eyelids for 3-5 minutes. This helps loosen scales or crusts.  4. Wet the washcloth again as often as needed to keep it warm.  Repeat 2 or more times a day. Use a clean washcloth each time.    To use an eyelid scrub:  1. Wash your hands with soap and warm water.  2. Use a ready-made eyelid scrub. Or, mix 3 drops of baby shampoo in 1/4 cup of warm water.  3. Dip a lint-free pad, cotton swab, or clean washcloth in the scrub.  4. Close one eye and gently scrub the base of the eyelid.  5. Rinse the lid in cool water and dry with a clean towel.  6. Repeat on the other eye.            Follow-ups after your visit        Follow-up notes from your care team     Return in about 6 months (around 4/3/2018) for Annual Eye Exam, Glaucoma check, OCT.      Your next 10 appointments already scheduled     Oct 03, 2017  9:45 AM CDT   Return Visit with Lee Reyes MD, MG OPHTH NURSE ONLY   [facility]  [facility])    [address] [facility] [address]   [phone]            Oct 24, 2017 10:00 AM CDT   [facility] RANDI [facility]   [facility]    [address] [facility] [address] [phone]            Oct 27, 2017  9:00 AM CDT   Return Visit with Sarahi Heranndez MD   [facility]    [address] [facility] [address] [phone]            Mar 15, 2018  9:00 AM CDT   LAB with LAB FIRST FLOOR Pending sale to [facility] ([facility])    [id number] [facility] [id number] [phone]           Patient must bring picture ID. Patient should be prepared to give a urine specimen  Please do not eat 10-12 hours before your appointment if you are coming in fasting for labs on lipids, cholesterol, or glucose (sugar). Pregnant women should follow their Care Team instructions. Water with medications is okay. Do not drink coffee or other fluids. If you have concerns about taking  your medications, please ask at office or if scheduling via M[employer], send a message by clicking on Secure Messaging, Message Your Care Team.            Mar 15, 2018 10:10 AM CDT   PHYSICAL with Candy Trujillo MD PhD   [facility]    [address] [facility] [address] [phone]            Apr 03, 2018  9:45 AM CDT   Return Visit with Lee Reyes MD, [facility] NURSE ONLY   [facility]    [address] [facility] [id number] [phone]              Who to contact     If you have questions or need follow up information about today's clinic visit or your schedule please contact [facility] directly at [phone].  Normal or non-critical lab and imaging results will be communicated to you by Mbaobao,  letter or phone within 4 business days after the clinic has received the results. If you do not hear from us within 7 days, please contact the clinic through [employer] or phone. If you have a critical or abnormal lab result, we will notify you by phone as soon as possible.  Submit refill requests through [employer] or call your pharmacy and they will forward the refill request to us. Please allow 3 business days for your refill to be completed.          Additional Information About Your Visit        [employer] Information     [employer] gives you secure access to your electronic health record. If you see a primary care provider, you can also send messages to your care team and make appointments. If you have questions, please call your primary care clinic.  If you do not have a primary care provider, please call [phone] and they will assist you.      [employer] is an electronic gateway that provides easy, online access to your medical records. With [employer], you can request a clinic appointment, read your test results, renew a prescription or communicate with your care team.     To access your existing account, please contact your [facility] or call [phone] for assistance.        Care EveryWhere ID     This is your Care EveryWhere ID. This could be used by other organizations to access your [facility] medical records  [id number]         Blood Pressure from Last 3 Encounters:   09/11/17 110/68   06/14/17 118/69   05/08/17 112/72    Weight from Last 3 Encounters:   09/11/17 54.4 kg (120 lb)   06/14/17 52.3 kg (115 lb 6.4 oz)   05/08/17 53.9 kg (118 lb 14.4 oz)              We Performed the Following     OCT Optic Nerve RNFL Optovue OU (both eyes)          Today's Medication Changes          These changes are accurate as of: 10/3/17  9:39 AM.  If you have any questions, ask your nurse or doctor.               These medicines have changed or have updated prescriptions.        Dose/Directions     gabapentin 100 MG capsule   Commonly known as:  NEURONTIN   This may have changed:    - how much to take  - additional instructions   Used for:  Restless leg syndrome        Dose:  200 mg   Take 2 capsules (200 mg) by mouth daily (late afternoon)   Quantity:  180 capsule   Refills:  3       polyethylene glycol powder   Commonly known as:  MIRALAX   This may have changed:    - when to take this  - reasons to take this   Used for:  Chronic constipation        Dose:  1 capful   Take 17 g (1 capful) by mouth daily   Quantity:  510 g   Refills:  11                Primary Care Provider Office Phone # Fax #    Candy Trujillo MD PhD [phone]       [address]  [facility] [address]        Equal Access to Services     CUATE FRANCO : Idania Espino, wasantos rushing, tia kaalmada neil, clark topete. So [facility] [phone].    ATENCIÓN: Si habla español, tiene a crandall disposición servicios gratuitos de asistencia lingüística. Llame al [phone].    We comply with applicable federal civil rights laws and [place] laws. We do not discriminate on the basis of race, color, national origin, age, disability, sex, sexual orientation, or gender identity.            Thank you!     Thank you for choosing [facility]  for your care. Our goal is always to provide you with excellent care. Hearing back from our patients is one way we can continue to improve our services. Please take a few minutes to complete the written survey that you may receive in the mail after your visit with us. Thank you!             Your Updated Medication List - Protect others around you: Learn how to safely use, store and throw away your medicines at www.disposemymeds.org.          This list is accurate as of: 10/3/17  9:39 AM.  Always use your most recent med list.                   Brand Name Dispense Instructions for use Diagnosis    albuterol 108 (90 BASE) MCG/ACT Inhaler     PROAIR HFA/PROVENTIL HFA/VENTOLIN HFA    1 Inhaler    Inhale 2 puffs into the lungs every 6 hours as needed for shortness of breath / dyspnea or wheezing    Asthma exacerbation, Influenza due to influenza virus, type B, Hospital discharge follow-up       bimatoprost 0.01 % Soln    LUMIGAN    3 Bottle    Place 1 drop into both eyes At Bedtime    Primary open angle glaucoma of both eyes, unspecified glaucoma stage       brimonidine-timolol 0.2-0.5 % ophthalmic solution    COMBIGAN    10 mL    Place 1 drop into both eyes 2 times daily    Primary open angle glaucoma of both eyes, moderate stage       CALCIUM 600 + D 600-200 MG-UNIT Tabs   Generic drug:  Calcium Carb-Cholecalciferol      Take 1 tablet by mouth daily Vitamin D=5377 iu    Keratoconjunctivitis sicca, not specified as Sjogren's, left, Glaucoma       ciclopirox 0.77 % cream    LOPROX    270 g    Apply topically 2 times daily To left big toe twice daily.    Dermatophytosis of nail, Peripheral arterial disease (H)       cycloSPORINE 0.05 % ophthalmic emulsion    RESTASIS    1 Box    Place 1 drop into both eyes every 12 hours    Keratoconjunctivitis sicca, not specified as Sjogren's, left       DILT- MG 24 hr capsule   Generic drug:  diltiazem     90 capsule    TAKE 1 CAPSULE (120 MG) BY MOUTH DAILY    Essential hypertension with goal blood pressure less than 140/90       doxepin 10 MG/ML (HIGH CONC) solution    SINEquan    10 mL    Take 0.3 mLs (3 mg) by mouth At Bedtime    Chronic insomnia       FLOVENT  MCG/ACT Inhaler   Generic drug:  fluticasone     36 g    INHALE 2 PUFFS INTO THE LUNGS 2 TIMES DAILY    Simple chronic bronchitis (H)       FLUoxetine 20 MG capsule    PROzac    90 capsule    Take 1 capsule (20 mg) by mouth daily    Depression with anxiety       gabapentin 100 MG capsule    NEURONTIN    180 capsule    Take 2 capsules (200 mg) by mouth daily (late afternoon)    Restless leg syndrome       GENTEAL SEVERE 0.3 % Gel ophthalmic gel    Generic drug:  hypromellose      Place 0.1 g (2 drops) into both eyes At Bedtime        oxybutynin 5 MG tablet    DITROPAN    30 tablet    Take 0.5-1 tablets (2.5-5 mg) by mouth At Bedtime    Urgency incontinence       polyethylene glycol powder    MIRALAX    510 g    Take 17 g (1 capful) by mouth daily    Chronic constipation       pravastatin 40 MG tablet    PRAVACHOL    45 tablet    TAKE 1/2 TABLET EVERY DAY    Hyperlipidemia LDL goal <100       SYSTANE 0.4-0.3 % Soln ophthalmic solution   Generic drug:  polyethylene glycol 0.4%- propylene glycol 0.3%      Place 1 drop into both eyes 3 times daily as needed for dry eyes    Keratoconjunctivitis sicca, not specified as Sjogren's, left, Glaucoma

## 2017-10-03 NOTE — NURSING NOTE
Patient presents with:  Glaucoma Follow Up: using lumigan qhs and combigan bid OU   Dry Eye Syndrome Follow Up: using restasis bid ou      Referring Provider:  ESTABLISHED PATIENT  No address on file    HPI    Affected eye(s):  Both   Symptoms:           Do you have eye pain now?:  No      Comments:     Glaucoma Follow Up: using lumigan qhs and combigan bid OU   Dry Eye Syndrome Follow Up: using restasis bid ou               Janeth WU OSC

## 2017-10-17 ENCOUNTER — OFFICE VISIT (OUTPATIENT)
Dept: OTOLARYNGOLOGY | Facility: CLINIC | Age: 82
End: 2017-10-17
Payer: COMMERCIAL

## 2017-10-17 VITALS — HEART RATE: 53 BPM | DIASTOLIC BLOOD PRESSURE: 70 MMHG | SYSTOLIC BLOOD PRESSURE: 126 MMHG

## 2017-10-17 DIAGNOSIS — H61.23 BILATERAL IMPACTED CERUMEN: Primary | ICD-10-CM

## 2017-10-17 PROCEDURE — 99212 OFFICE O/P EST SF 10 MIN: CPT | Mod: 25 | Performed by: OTOLARYNGOLOGY

## 2017-10-17 PROCEDURE — 92504 EAR MICROSCOPY EXAMINATION: CPT | Performed by: OTOLARYNGOLOGY

## 2017-10-17 NOTE — MR AVS SNAPSHOT
After Visit Summary   10/17/2017    Nancy Benz    MRN: [id number]           Patient Information     Date Of Birth          8/5/1922        Visit Information        Provider Department      10/17/2017 10:30 AM Sarahi Hernandez MD [facility]        Today's Diagnoses     Bilateral impacted cerumen    -  1       Follow-ups after your visit        Your next 10 appointments already scheduled     Oct 24, 2017 10:00 AM CDT   Return Visit with RANDI Natarajan MD   [facility] ([facility])    [address] [facility] [address] [phone]            Mar 15, 2018  9:00 AM CDT   LAB with LAB FIRST FLOOR [facility])    [address] [place] [address] [phone]           Patient must bring picture ID. Patient should be prepared to give a urine specimen  Please do not eat 10-12 hours before your appointment if you are coming in fasting for labs on lipids, cholesterol, or glucose (sugar). Pregnant women should follow their Care Team instructions. Water with medications is okay. Do not drink coffee or other fluids. If you have concerns about taking  your medications, please ask at office or if scheduling via [employer], send a message by clicking on Secure Messaging, Message Your Care Team.            Mar 15, 2018 10:10 AM CDT   PHYSICAL with Candy Trujillo MD PhD   [facility] ([facility])    [address] [place] [address] [phone]            Apr 03, 2018  9:45 AM CDT   Return Visit with Lee Reyes MD, [facility] NURSE ONLY   [facility] ([facility])    [id number] [facility] [address] [phone]              Who to contact     If you have questions or need follow up information about today's clinic visit or your schedule please contact [facility] directly at [phone].  Normal or non-critical lab and imaging results will be communicated to you by [employer], letter or phone within 4 business days after the clinic has received the results. If you do not hear from us within 7 days, please contact the clinic through [employer] or phone. If you have a critical or abnormal lab result, we will notify you by phone as soon as possible.  Submit refill requests through [employer] or call your pharmacy and they will forward the refill request to us. Please allow 3 business days for your refill to be completed.          Additional Information About Your Visit        [employer] Information     [employer] gives you secure access to your electronic health record. If you see a primary care provider, you can also send messages to your care team and make appointments. If you have questions, please call your primary care clinic.  If you do not have a primary care provider, please call [phone] and they will assist you.      [employer] is an electronic gateway that provides easy, online access to your medical records. With [employer], you can request a clinic appointment, read your test results, renew a prescription or communicate with your care team.     To access your existing account, please contact your [facility] or call [phone] for assistance.        Care EveryWhere ID     This is your Care EveryWhere ID. This could be used by other organizations to access your [facility] medical records  [id number]        Your Vitals Were     Pulse                   53            Blood Pressure from Last 3 Encounters:   10/17/17 126/70   09/11/17 110/68   06/14/17 118/69    Weight from Last 3 Encounters:   09/11/17 54.4 kg (120 lb)   06/14/17 52.3 kg (115 lb 6.4 oz)   05/08/17 53.9 kg (118 lb 14.4 oz)              We Performed the Following     Remove Cerumen          Today's Medication Changes          These changes are accurate as of: 10/17/17 11:02  AM.  If you have any questions, ask your nurse or doctor.               These medicines have changed or have updated prescriptions.        Dose/Directions    gabapentin 100 MG capsule   Commonly known as:  NEURONTIN   This may have changed:    - how much to take  - additional instructions   Used for:  Restless leg syndrome        Dose:  200 mg   Take 2 capsules (200 mg) by mouth daily (late afternoon)   Quantity:  180 capsule   Refills:  3       polyethylene glycol powder   Commonly known as:  MIRALAX   This may have changed:    - when to take this  - reasons to take this   Used for:  Chronic constipation        Dose:  1 capful   Take 17 g (1 capful) by mouth daily   Quantity:  510 g   Refills:  11                Primary Care Provider Office Phone # Fax #    Candy Trujillo MD PhD [phone] [address] [facility] [address]        Equal Access to Services     ASHUTOSH FRANCO : Idania bowles Sokevin, waaxda luqadaha, qaybta kaalmada adeegyagiuseppe, clark alfredo . So [facility] [phone].    ATENCIÓN: Si habla español, tiene a crandall disposición servicios gratuitos de asistencia lingüística. Llame al [phone].    We comply with applicable federal civil rights laws and [place] laws. We do not discriminate on the basis of race, color, national origin, age, disability, sex, sexual orientation, or gender identity.            Thank you!     Thank you for choosing [facility]  for your care. Our goal is always to provide you with excellent care. Hearing back from our patients is one way we can continue to improve our services. Please take a few minutes to complete the written survey that you may receive in the mail after your visit with us. Thank you!             Your Updated Medication List - Protect others around you: Learn how to safely use, store and throw away your medicines at www.disposemymeds.org.          This list is accurate as of: 10/17/17 11:02  AM.  Always use your most recent med list.                   Brand Name Dispense Instructions for use Diagnosis    albuterol 108 (90 BASE) MCG/ACT Inhaler    PROAIR HFA/PROVENTIL HFA/VENTOLIN HFA    1 Inhaler    Inhale 2 puffs into the lungs every 6 hours as needed for shortness of breath / dyspnea or wheezing    Asthma exacerbation, Influenza due to influenza virus, type B, Hospital discharge follow-up       bimatoprost 0.01 % Soln    LUMIGAN    3 Bottle    Place 1 drop into both eyes At Bedtime    Primary open angle glaucoma of both eyes, unspecified glaucoma stage       brimonidine-timolol 0.2-0.5 % ophthalmic solution    COMBIGAN    10 mL    Place 1 drop into both eyes 2 times daily    Primary open angle glaucoma of both eyes, moderate stage       CALCIUM 600 + D 600-200 MG-UNIT Tabs   Generic drug:  Calcium Carb-Cholecalciferol      Take 1 tablet by mouth daily Vitamin V=2320 iu    Keratoconjunctivitis sicca, not specified as Sjogren's, left, Glaucoma       ciclopirox 0.77 % cream    LOPROX    270 g    Apply topically 2 times daily To left big toe twice daily.    Dermatophytosis of nail, Peripheral arterial disease (H)       cycloSPORINE 0.05 % ophthalmic emulsion    RESTASIS    1 Box    Place 1 drop into both eyes every 12 hours    Keratoconjunctivitis sicca, not specified as Sjogren's, left       DILT- MG 24 hr capsule   Generic drug:  diltiazem     90 capsule    TAKE 1 CAPSULE (120 MG) BY MOUTH DAILY    Essential hypertension with goal blood pressure less than 140/90       doxepin 10 MG/ML (HIGH CONC) solution    SINEquan    10 mL    Take 0.3 mLs (3 mg) by mouth At Bedtime    Chronic insomnia       FLOVENT  MCG/ACT Inhaler   Generic drug:  fluticasone     36 g    INHALE 2 PUFFS INTO THE LUNGS 2 TIMES DAILY    Simple chronic bronchitis (H)       FLUoxetine 20 MG capsule    PROzac    90 capsule    Take 1 capsule (20 mg) by mouth daily    Depression with anxiety       gabapentin 100 MG capsule     NEURONTIN    180 capsule    Take 2 capsules (200 mg) by mouth daily (late afternoon)    Restless leg syndrome       GENTEAL SEVERE 0.3 % Gel ophthalmic gel   Generic drug:  hypromellose      Place 0.1 g (2 drops) into both eyes At Bedtime        oxybutynin 5 MG tablet    DITROPAN    30 tablet    Take 0.5-1 tablets (2.5-5 mg) by mouth At Bedtime    Urgency incontinence       polyethylene glycol powder    MIRALAX    510 g    Take 17 g (1 capful) by mouth daily    Chronic constipation       pravastatin 40 MG tablet    PRAVACHOL    45 tablet    TAKE 1/2 TABLET EVERY DAY    Hyperlipidemia LDL goal <100       SYSTANE 0.4-0.3 % Soln ophthalmic solution   Generic drug:  polyethylene glycol 0.4%- propylene glycol 0.3%      Place 1 drop into both eyes 3 times daily as needed for dry eyes    Keratoconjunctivitis sicca, not specified as Sjogren's, left, Glaucoma

## 2017-10-17 NOTE — PROGRESS NOTES
HPI    This pleasant patient is here for ear wax removal. States plugged up sensation in her left ear. Denies any ear drainage, pain, or vertigo.    Review of Systems   Constitutional: Negative.    HENT: Positive for hearing loss. Negative for ear discharge, ear pain and nosebleeds.    Eyes: Negative for blurred vision and double vision.   Gastrointestinal: Negative for heartburn, nausea and vomiting.   Skin: Negative.    Neurological: Negative for headaches.   Endo/Heme/Allergies: Negative for environmental allergies. Does not bruise/bleed easily.         Physical Exam   Constitutional: She is well-developed, well-nourished, and in no distress.   HENT:   Head: Normocephalic and atraumatic.   Right Ear: Tympanic membrane normal. No drainage, swelling or tenderness. No middle ear effusion. Decreased hearing is noted.   Left Ear: Tympanic membrane normal. No drainage, swelling or tenderness.  No middle ear effusion. Decreased hearing is noted.   Nose: Nose normal.   Mouth/Throat: Oropharynx is clear and moist. No oropharyngeal exudate.       Ear wax removal: On both ear canals, ear wax blocked the canal more than 90%. By suctioning and with an alligator, ear wax was removed. Ear drums were seen as normal. The patient tolerated the procedure well and left the room with no complications.    F/u as needed.

## 2017-10-17 NOTE — NURSING NOTE
Nancy Benz's goals for this visit include:   Chief Complaint   Patient presents with     RECHECK       She requests these members of her care team be copied on today's visit information: yes      PCP: Candy Trujillo    Referring Provider:  Referred Self, MD  No address on file    Chief Complaint   Patient presents with     RECHECK       Initial /70  Pulse 53 Estimated body mass index is 23.44 kg/(m^2) as calculated from the following:    Height as of 6/14/17: 1.524 m (5').    Weight as of 9/11/17: 54.4 kg (120 lb).  Medication Reconciliation: complete   Lucille Peña CMA (AAMA)

## 2017-10-24 ENCOUNTER — OFFICE VISIT (OUTPATIENT)
Dept: DERMATOLOGY | Facility: CLINIC | Age: 82
End: 2017-10-24
Payer: COMMERCIAL

## 2017-10-24 DIAGNOSIS — L82.1 SEBORRHEIC KERATOSES: ICD-10-CM

## 2017-10-24 DIAGNOSIS — D22.9 MULTIPLE BENIGN NEVI: ICD-10-CM

## 2017-10-24 DIAGNOSIS — R20.2 NOTALGIA PARESTHETICA: Primary | ICD-10-CM

## 2017-10-24 DIAGNOSIS — L81.4 SOLAR LENTIGO: ICD-10-CM

## 2017-10-24 PROCEDURE — 99213 OFFICE O/P EST LOW 20 MIN: CPT | Performed by: DERMATOLOGY

## 2017-10-24 NOTE — PROGRESS NOTES
SUBJECTIVE:  Mrs. Lazara Trevino comes in with her , Alcides, for a skin check today.  She has no particular concerns, but has chronic itching on her right upper back.      OBJECTIVE:  Shows a very healthy and pleasant lady of 89 in no acute distress.  She does have a lot of periocular redness, perhaps from glaucoma eyedrops. At any rate, that was not a concern.      We checked her chest, back, arms, legs, hands and feet and found numerous seborrheic keratoses, lentigines and benign nevi.  No precancers of the face noted nor anything resembling a skin cancer.  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      PLAN:  Meds and allergies reviewed.  She was reassured.  I suggested that she return in 1 year.  She is congratulated her having been  to her , Alcides, for 75 years today.         RANDI ASCENCIO MD             D: 10/24/2017 12:38   T: 10/24/2017 17:46   MT: TS      Name:     LAZARA TREVINO   MRN:      [id number]        Account:      [id number]   :      1922           Visit Date:   10/24/2017      Document: [id number]

## 2017-10-24 NOTE — LETTER
10/24/2017       RE: Lazara Trevino  [address] [address] [place] [id number]     Dear Colleague,    Thank you for referring your patient, Lazara Trevino, to the [facility] at [facility]. Please see a copy of my visit note below.    Dictated      SUBJECTIVE:  Mrs. Lazara Trevino comes in with her , Alcides, for a skin check today.  She has no particular concerns, but has chronic itching on her right upper back.      OBJECTIVE:  Shows a very healthy and pleasant lady of 89 in no acute distress.  She does have a lot of periocular redness, perhaps from glaucoma eyedrops. At any rate, that was not a concern.      We checked her chest, back, arms, legs, hands and feet and found numerous seborrheic keratoses, lentigines and benign nevi.  No precancers of the face noted nor anything resembling a skin cancer.  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      PLAN:  Meds and allergies reviewed.  She was reassured.  I suggested that she return in 1 year.  She is congratulated her having been  to her , Alcides, for 75 years today.         RANDI ASCENCIO MD             D: 10/24/2017 12:38   T: 10/24/2017 17:46   MT: TS      Name:     LAZARA TREVINO   MRN:      [id number]        Account:      [id number]   :      1922           Visit Date:   10/24/2017      Document: [id number]        Again, thank you for allowing me to participate in the care of your patient.      Sincerely,    RANDI Ascencio MD

## 2017-10-24 NOTE — PATIENT INSTRUCTIONS
The itch on the back is called notalgia paresthetica - tiny nerves fire off giving a nagging itch. No good treatment beyond itch creams like Sarna and gentle back scratching.     Congratulations on your anniversary.

## 2017-10-24 NOTE — MR AVS SNAPSHOT
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 No good treatment beyond itch creams like Sarna and gentle back scratching.     Congratulations on your anniversary.            Follow-ups after your visit        Your next 10 appointments already scheduled     Mar 15, 2018  9:00 AM CDT   LAB with LAB FIRST FLOOR [facility] ([facility])    [address] [place] [id number] [phone]           Patient must bring picture ID. Patient should be prepared to give a urine specimen  Please do not eat 10-12 hours before your appointment if you are coming in fasting for labs on lipids, cholesterol, or glucose (sugar). Pregnant women should follow their Care Team instructions. Water with medications is okay. Do not drink coffee or other fluids. If you have concerns about taking  your medications, please ask at office or if scheduling via [employer], send a message by clicking on Secure Messaging, Message Your Care Team.            Mar 15, 2018 10:10 AM CDT   PHYSICAL with Candy Trujillo MD PhD   [facility] ([facility])    [address] [place] [id number] [phone]            Apr 03, 2018  9:45 AM CDT   Return Visit with Lee Reyes MD, [facility] NURSE ONLY   [facility] ([facility])    [address] [place] [address] [phone]              Who to contact     If you have questions or need follow up information about today's clinic visit or your schedule please contact [facility] directly at  [phone].  Normal or non-critical lab and imaging results will be communicated to you by [email or web address], letter or phone within 4 business days after the clinic has received the results. If you do not hear from us within 7 days, please contact the clinic through [employer] or phone. If you have a critical or abnormal lab result, we will notify you by phone as soon as possible.  Submit refill requests through [employer] or call your pharmacy and they will forward the refill request to us. Please allow 3 business days for your refill to be completed.          Additional Information About Your Visit        [email or web address] Information     Trapmine gives you secure access to your electronic health record. If you see a primary care provider, you can also send messages to your care team and make appointments. If you have questions, please call your primary care clinic.  If you do not have a primary care provider, please call [phone] and they will assist you.      Trapmine is an electronic gateway that provides easy, online access to your medical records. With Trapmine, you can request a clinic appointment, read your test results, renew a prescription or communicate with your care team.     To access your existing account, please contact your [facility] or call [phone] for assistance.        Care EveryWhere ID     This is your Care EveryWhere ID. This could be used by other organizations to access your [place] medical records  [id number]         Blood Pressure from Last 3 Encounters:   10/17/17 126/70   09/11/17 110/68   06/14/17 118/69    Weight from Last 3 Encounters:   09/11/17 54.4 kg (120 lb)   06/14/17 52.3 kg (115 lb 6.4 oz)   05/08/17 53.9 kg (118 lb 14.4 oz)              Today, you had the following     No orders found for display         Today's Medication Changes          These changes are accurate as of: 10/24/17 10:32 AM.  If you have any questions, ask your nurse or doctor.                These medicines have changed or have updated prescriptions.        Dose/Directions    gabapentin 100 MG capsule   Commonly known as:  NEURONTIN   This may have changed:    - how much to take  - additional instructions   Used for:  Restless leg syndrome        Dose:  200 mg   Take 2 capsules (200 mg) by mouth daily (late afternoon)   Quantity:  180 capsule   Refills:  3       polyethylene glycol powder   Commonly known as:  MIRALAX   This may have changed:    - when to take this  - reasons to take this   Used for:  Chronic constipation        Dose:  1 capful   Take 17 g (1 capful) by mouth daily   Quantity:  510 g   Refills:  11                Primary Care Provider Office Phone # Fax #    Candy Trujillo MD PhD [phone]       [address]  [facility] [address]        Equal Access to Services     ASHUTOSH FRANCO : Idania sharpo Sokevin, waaxda luqadaha, qaybta kaalmada adeegyada, clark alfredo . So [facility] [phone].    ATENCIÓN: Si habla español, tiene a crandall disposición servicios gratuitos de asistencia lingüística. [facility] [phone].    We comply with applicable federal civil rights laws and [place] laws. We do not discriminate on the basis of race, color, national origin, age, disability, sex, sexual orientation, or gender identity.            Thank you!     Thank you for choosing [facility]  for your care. Our goal is always to provide you with excellent care. Hearing back from our patients is one way we can continue to improve our services. Please take a few minutes to complete the written survey that you may receive in the mail after your visit with us. Thank you!             Your Updated Medication List - Protect others around you: Learn how to safely use, store and throw away your medicines at www.disposemymeds.org.          This list is accurate as of: 10/24/17 10:32 AM.  Always use your most recent med list.                   Brand Name  Dispense Instructions for use Diagnosis    albuterol 108 (90 BASE) MCG/ACT Inhaler    PROAIR HFA/PROVENTIL HFA/VENTOLIN HFA    1 Inhaler    Inhale 2 puffs into the lungs every 6 hours as needed for shortness of breath / dyspnea or wheezing    Asthma exacerbation, Influenza due to influenza virus, type B, Hospital discharge follow-up       bimatoprost 0.01 % Soln    LUMIGAN    3 Bottle    Place 1 drop into both eyes At Bedtime    Primary open angle glaucoma of both eyes, unspecified glaucoma stage       brimonidine-timolol 0.2-0.5 % ophthalmic solution    COMBIGAN    10 mL    Place 1 drop into both eyes 2 times daily    Primary open angle glaucoma of both eyes, moderate stage       CALCIUM 600 + D 600-200 MG-UNIT Tabs   Generic drug:  Calcium Carb-Cholecalciferol      Take 1 tablet by mouth daily Vitamin E=5077 iu    Keratoconjunctivitis sicca, not specified as Sjogren's, left, Glaucoma       ciclopirox 0.77 % cream    LOPROX    270 g    Apply topically 2 times daily To left big toe twice daily.    Dermatophytosis of nail, Peripheral arterial disease (H)       cycloSPORINE 0.05 % ophthalmic emulsion    RESTASIS    1 Box    Place 1 drop into both eyes every 12 hours    Keratoconjunctivitis sicca, not specified as Sjogren's, left       DILT- MG 24 hr capsule   Generic drug:  diltiazem     90 capsule    TAKE 1 CAPSULE (120 MG) BY MOUTH DAILY    Essential hypertension with goal blood pressure less than 140/90       doxepin 10 MG/ML (HIGH CONC) solution    SINEquan    10 mL    Take 0.3 mLs (3 mg) by mouth At Bedtime    Chronic insomnia       FLOVENT  MCG/ACT Inhaler   Generic drug:  fluticasone     36 g    INHALE 2 PUFFS INTO THE LUNGS 2 TIMES DAILY    Simple chronic bronchitis (H)       FLUoxetine 20 MG capsule    PROzac    90 capsule    Take 1 capsule (20 mg) by mouth daily    Depression with anxiety       gabapentin 100 MG capsule    NEURONTIN    180 capsule    Take 2 capsules (200 mg) by mouth daily (late  afternoon)    Restless leg syndrome       GENTEAL SEVERE 0.3 % Gel ophthalmic gel   Generic drug:  hypromellose      Place 0.1 g (2 drops) into both eyes At Bedtime        oxybutynin 5 MG tablet    DITROPAN    30 tablet    Take 0.5-1 tablets (2.5-5 mg) by mouth At Bedtime    Urgency incontinence       polyethylene glycol powder    MIRALAX    510 g    Take 17 g (1 capful) by mouth daily    Chronic constipation       pravastatin 40 MG tablet    PRAVACHOL    45 tablet    TAKE 1/2 TABLET EVERY DAY    Hyperlipidemia LDL goal <100       SYSTANE 0.4-0.3 % Soln ophthalmic solution   Generic drug:  polyethylene glycol 0.4%- propylene glycol 0.3%      Place 1 drop into both eyes 3 times daily as needed for dry eyes    Keratoconjunctivitis sicca, not specified as Sjogren's, left, Glaucoma

## 2017-12-06 ENCOUNTER — NURSE TRIAGE (OUTPATIENT)
Dept: NURSING | Facility: CLINIC | Age: 82
End: 2017-12-06

## 2017-12-06 ENCOUNTER — TRANSFERRED RECORDS (OUTPATIENT)
Dept: HEALTH INFORMATION MANAGEMENT | Facility: CLINIC | Age: 82
End: 2017-12-06

## 2017-12-06 ENCOUNTER — TELEPHONE (OUTPATIENT)
Dept: PEDIATRICS | Facility: CLINIC | Age: 82
End: 2017-12-06

## 2017-12-06 NOTE — TELEPHONE ENCOUNTER
Daughter is also willing to see Dr. Tabitha Morris today at [place] with the walk in appointment option override as well?    Please contact Melisa.    Thank you.    Central Scheduling  Marie TRIPP

## 2017-12-06 NOTE — TELEPHONE ENCOUNTER
Daughter calling stating patient's nursing home called stating she should go to Urgent Care for Cough/Cold symptoms.  Patient is not present for triage. Requesting to schedule with Dr Trujillo.    Oxana Solano RN  [facility]

## 2017-12-06 NOTE — TELEPHONE ENCOUNTER
[facility] Call Center    Phone Message    Name of Caller: LAAZRA TREVINO and     Phone Number: Home number on file [phone] (home)    Best time to return call: TODAY    May a detailed message be left on voicemail: no    Relation to patient: Self    Reason for Call: Other: Pt had asked for an appt today.  No availability on Dr Trujillo's schedule.  They do not want to go to urgent care or talk to FNA.  They are asking for a call back from Dr Trujillo's nurse.  Pt experiencing wheezing, allergies, cold symptoms.       Action Taken: Message routed to:  Primary Care p [phone]

## 2017-12-06 NOTE — TELEPHONE ENCOUNTER
Patient's daughter called today.    Patient was triaged to see Dr. Trujillo today.  Patient has upper respiratory problem.    Please contact Melisa.    Thank you.    Central Scheduling  Marie TRIPP

## 2017-12-06 NOTE — TELEPHONE ENCOUNTER
Informed Melisa, patients daughter, no appointments available today at [place].  She will take patient to urgent care.    Katie Prater RN,   [facility] [place] [facility]

## 2017-12-06 NOTE — TELEPHONE ENCOUNTER
Left message for patient to return call to clinic and ask to speak with RN.    CALL CENTER-OK TO SCHEDULE PATIENT WITH DR. UMAÑA TOMORROW IN SAME DAY SLOT.    Katie Prater RN,   [facility]

## 2017-12-07 NOTE — TELEPHONE ENCOUNTER
, Alcides calling clinic back. Dr Trujillo patient.  Patient is currently inpatient/hospital.  [phone] (home) none (work)

## 2017-12-14 ENCOUNTER — CARE COORDINATION (OUTPATIENT)
Dept: CARE COORDINATION | Facility: CLINIC | Age: 82
End: 2017-12-14

## 2017-12-14 NOTE — PROGRESS NOTES
[facility]: Post-Discharge Note  SITUATION                                                      Admission Date: 12/6/17  Discharge:    Date: 12/12/17   Diagnosis:  Acute Hypoxemic Respiratory Failure, UTI   Discharge Plan: Initiate home care, follow up with pcp for hospital  follow up, recheck sodium levels.     BACKGROUND                                                      Respiratory problems      ASSESSMENT      Patient reports symptoms are: stable- feeling tired, not back to usual self yet.  Does patient have all of their medications? No: is waiting for neb machine  Does patient know what their medications are for? Yes, new medications, (prednisone, duo neb, albuterol, omeprazole, and Levaquin) reviewed with both patient and daughter Kady. No further med questions at this time.  Does patient have follow-up appointment(s) scheduled?  Yes  Does patient have any other questions or concerns?  No, patient's daughters Melisa and Kady are helping the patient after discharge. They are seeking placement for assisted living at [facility] and have begun paperwork.      PLAN                                                      Outpatient Plan:  [facility] referrals have been placed.  Next 5 appointments (look out 90 days)     Dec 21, 2017  2:10 PM CST   Return Visit with Candy Trujillo MD PhD   [facility])    [address] [place] [phone] [phone]                Lorraine Crespo RN

## 2017-12-15 DIAGNOSIS — F41.8 DEPRESSION WITH ANXIETY: ICD-10-CM

## 2017-12-16 ENCOUNTER — DOCUMENTATION ONLY (OUTPATIENT)
Dept: CARE COORDINATION | Facility: CLINIC | Age: 82
End: 2017-12-16

## 2017-12-16 ENCOUNTER — MYC MEDICAL ADVICE (OUTPATIENT)
Dept: PEDIATRICS | Facility: CLINIC | Age: 82
End: 2017-12-16

## 2017-12-16 NOTE — PROGRESS NOTES
[facility] now requests orders and shares plan of care/discharge summaries for some patients through [employer].  Please REPLY TO THIS MESSAGE in order to give authorization for orders when needed.  This is considered a verbal order, you will still receive a faxed copy of orders for signature.  Thank you for your assistance in improving collaboration for our patients.    ORDER  SN 1w1,2w3,1w1 3 PRN  PT eval and treat  OT eval and treat  HHA for bathing and personal cares    Pt does not have omeprazole or oxybutynin in the home if she is supposed to be taking the medications? Also is patient supposed to be continuing the flovent bid with the new duo-neb? Please advise      SUMMARY TO MD  SITUATION Pt alert and oriented. Slightly confused with new medications. VS stable. lungs cta. O2 sats with no oxygen at 98 percent. SOB with minimal activity with fatigue and weakness. Pt denies any pain. AMbulates slowly with wheeled walker. Complete some ADLs and IADLs independently but unsafe as patient feels weak. Pt has not showered since she has been home as she feels too weak. Pt is currently managing medications with assistance of daughters. Pt is missing 2 medications at SOC, lasix and omperazole. Family is to  lasix. Urine incontinence. wear incontinent product. No pain, or burning with urination and is on levofloxcin for UTI to be completed on 12/18. No concerns with BM, taking miralax daily. Last Bm yesterday.BS present x4. Edema plus 1 in LLE and trace in RLE. Pt to follow up with Dr. Trujillo with medication on 12/21 appt. Denies any s/s of depression. Scored 0 on PHQ2.   BACKGROUND Pt hospitalized at [facility] 12/6/17 for acute resp failure and treated for an UTI. Pt discharged home on 12/12/17. Pt seen in clinic with Dr. Trujillo on 12/13/17 with orders for homecare. Pt continues to feel weak and fatigue.  ANALYSIS Pt at risk for rehospitalization d/t medical frailty, resp failure  RECOMMENDATION  SN to assess vital signs and weight, respiratory and cardiac status, pain level and activity tolerance, hydration, nutrition and bowel status, skin integrity and home safety. SN to teach medication management.  Physical Therapy Services are needed to assess and treat the following functional impairments: deconditioning, strengthening and endurance.  Occupational Therapy Services are needed to assess and treat cognitive ability and address ADL safety due to impairment. HHA for bathing and personal cares.    Tricia Mchugh RN  [phone]  [email or web address]

## 2017-12-16 NOTE — PROGRESS NOTES
Verbal order approval for the requested orders indicated below.     Unable to make recommendation on medications until patient's follow up appointment next week.

## 2017-12-17 ENCOUNTER — TRANSFERRED RECORDS (OUTPATIENT)
Dept: HEALTH INFORMATION MANAGEMENT | Facility: CLINIC | Age: 82
End: 2017-12-17

## 2017-12-18 ENCOUNTER — DOCUMENTATION ONLY (OUTPATIENT)
Dept: OTHER | Facility: CLINIC | Age: 82
End: 2017-12-18

## 2017-12-18 DIAGNOSIS — Z71.89 ACP (ADVANCE CARE PLANNING): Chronic | ICD-10-CM

## 2017-12-19 NOTE — TELEPHONE ENCOUNTER
FLUoxetine (PROZAC) 20 MG capsule  Last Written Prescription Date: 5/8/2017  Last Fill Quantity: 90, # refills: 1  Last Office Visit with [facility] primary care provider:  9/11/2017   Next 5 appointments (look out 90 days)     Mar 15, 2018 10:10 AM CDT   PHYSICAL with Candy [facility]illo MD PhD   [facility]    [address] [place] [id number] [phone]                   Last PHQ-9 score on record=   PHQ-9 SCORE 11/28/2016   Total Score 3       Refilled per P protocol.    Evelin Esqueda RN

## 2017-12-28 DIAGNOSIS — I10 ESSENTIAL HYPERTENSION WITH GOAL BLOOD PRESSURE LESS THAN 140/90: ICD-10-CM

## 2017-12-28 DIAGNOSIS — E78.5 HYPERLIPIDEMIA LDL GOAL <100: ICD-10-CM

## 2017-12-29 ENCOUNTER — CARE COORDINATION (OUTPATIENT)
Dept: CARE COORDINATION | Facility: CLINIC | Age: 82
End: 2017-12-29

## 2017-12-29 RX ORDER — DILTIAZEM HYDROCHLORIDE 120 MG/1
CAPSULE, EXTENDED RELEASE ORAL
Qty: 90 CAPSULE | Refills: 0 | Status: SHIPPED | OUTPATIENT
Start: 2017-12-29 | End: 2018-06-26

## 2017-12-29 RX ORDER — PRAVASTATIN SODIUM 40 MG
TABLET ORAL
Qty: 45 TABLET | Refills: 0 | Status: SHIPPED | OUTPATIENT
Start: 2017-12-29 | End: 2018-07-19

## 2017-12-29 NOTE — TELEPHONE ENCOUNTER
Refilled per RN protocol.     Next 5 appointments (look out 90 days)     Jan 05, 2018  9:10 AM CST   Return Visit with Candy Trujillo MD PhD   [facility])    [address] [place] [phone]   [phone]            Mar 15, 2018 10:10 AM CDT   PHYSICAL with Candy Trujillo MD PhD   [facility])    [address] [place] [id number] [phone]                  Almaz Mclean RN, [facility]

## 2017-12-29 NOTE — PROGRESS NOTES
[facility]: Post-Discharge Note  SITUATION                                                      Admission Date: 12/17/17  Discharge:    Date: 12/27/17   Diagnosis:  Anxiety and depression   Nonrheumatic aortic valve insufficiency   Prediabetes   Acute hypoxemic respiratory failure (HCC)   Ambien use disorder, severe, dependence (HCC)   Pulmonary embolus (HCC)   DVT (deep venous thrombosis) (HCC)   GI bleed     Discharge Plan: Discharge to home. Follow up with Psych and Primary Care. Was referred to skilled home care services from the hospital.     BACKGROUND                                                       Per [facility] report:   Patient was admitted for toxic encephalopathy secondary to suicide attempt with intentional overdose with Ambien as well as respiratory failure and was found to have a pulmonary embolism and left gastrocnemius vein DVT        ASSESSMENT      Patient reports symptoms are: stable per patient.  Does patient have all of their medications? Yes  Does patient know what their medications are for? Yes  Does patient have follow-up appointment(s) scheduled?  Yes  Does patient have any other questions or concerns?  No, patient did not offer any questions or concerns r/t discharge instructions or medications.      PLAN                                                      Outpatient Plan:  Patient will follow up with primary care on 1/5/18. Encouraged the patient to call us with any questions or concerns prior to her appointment. Patient verbalized understanding and agreed.       Next 5 appointments (look out 90 days)     Jan 05, 2018  9:10 AM CST   Return Visit with Candy Trujillo MD PhD   [facility])    [address] [place] [id number]   [phone]            Mar 15, 2018 10:10 AM CDT   PHYSICAL with Candy Trujillo MD PhD   [facility])    [address] [place] [address]  [place] [address]   [phone]                Lorraine Crespo, RN  RN Care Coordinator  [facility]  [phone]

## 2018-01-01 ENCOUNTER — DOCUMENTATION ONLY (OUTPATIENT)
Dept: CARE COORDINATION | Facility: CLINIC | Age: 83
End: 2018-01-01

## 2018-01-02 NOTE — PROGRESS NOTES
[facility] now requests orders and shares plan of care/discharge summaries for some patients through [employer].  Please REPLY TO THIS MESSAGE in order to give authorization for orders when needed.  This is considered a verbal order, you will still receive a faxed copy of orders for signature.  Thank you for your assistance in improving collaboration for our patients.    ORDER  Requesting resumption of care orders for patient.  3w1, 2w2, 1w4 and 3 PRN visits, SW assess, HHA 1w7, PT and OT eval and treat for mental health assessments, home safety, falls prevention.  Weekly Hgb.

## 2018-01-03 ENCOUNTER — DOCUMENTATION ONLY (OUTPATIENT)
Dept: CARE COORDINATION | Facility: CLINIC | Age: 83
End: 2018-01-03

## 2018-01-03 ENCOUNTER — MYC MEDICAL ADVICE (OUTPATIENT)
Dept: PEDIATRICS | Facility: CLINIC | Age: 83
End: 2018-01-03

## 2018-01-03 LAB — HGB BLD-MCNC: 11.8 G/DL (ref 11.7–15.7)

## 2018-01-03 PROCEDURE — 85018 HEMOGLOBIN: CPT | Performed by: INTERNAL MEDICINE

## 2018-01-03 NOTE — PROGRESS NOTES
[facility] now requests orders and shares plan of care/discharge summaries for some patients through [employer].  Please REPLY TO THIS MESSAGE in order to give authorization for orders when needed.  This is considered a verbal order, you will still receive a faxed copy of orders for signature.  Thank you for your assistance in improving collaboration for our patients.    ORDER- requesting home PT to continue 1x a week for 4 weeks for gait and balance training, there ex including instruction in HEP.

## 2018-01-05 ENCOUNTER — TELEPHONE (OUTPATIENT)
Dept: PEDIATRICS | Facility: CLINIC | Age: 83
End: 2018-01-05

## 2018-01-05 ENCOUNTER — OFFICE VISIT (OUTPATIENT)
Dept: PEDIATRICS | Facility: CLINIC | Age: 83
End: 2018-01-05
Payer: COMMERCIAL

## 2018-01-05 VITALS
SYSTOLIC BLOOD PRESSURE: 140 MMHG | DIASTOLIC BLOOD PRESSURE: 72 MMHG | BODY MASS INDEX: 23.05 KG/M2 | TEMPERATURE: 97.1 F | WEIGHT: 118 LBS | HEART RATE: 58 BPM | OXYGEN SATURATION: 98 %

## 2018-01-05 DIAGNOSIS — I82.492 ACUTE DEEP VEIN THROMBOSIS (DVT) OF OTHER SPECIFIED VEIN OF LEFT LOWER EXTREMITY (H): ICD-10-CM

## 2018-01-05 DIAGNOSIS — F51.04 CHRONIC INSOMNIA: ICD-10-CM

## 2018-01-05 DIAGNOSIS — J45.40 MODERATE PERSISTENT ASTHMA WITHOUT COMPLICATION: ICD-10-CM

## 2018-01-05 DIAGNOSIS — I26.99 OTHER ACUTE PULMONARY EMBOLISM WITHOUT ACUTE COR PULMONALE (H): ICD-10-CM

## 2018-01-05 DIAGNOSIS — E87.1 HYPONATREMIA: ICD-10-CM

## 2018-01-05 DIAGNOSIS — Z09 HOSPITAL DISCHARGE FOLLOW-UP: Primary | ICD-10-CM

## 2018-01-05 DIAGNOSIS — J45.41 MODERATE PERSISTENT ASTHMA WITH EXACERBATION: ICD-10-CM

## 2018-01-05 LAB
ANION GAP SERPL CALCULATED.3IONS-SCNC: 7 MMOL/L (ref 3–14)
BUN SERPL-MCNC: 11 MG/DL (ref 7–30)
CALCIUM SERPL-MCNC: 8.2 MG/DL (ref 8.5–10.1)
CHLORIDE SERPL-SCNC: 87 MMOL/L (ref 94–109)
CO2 SERPL-SCNC: 29 MMOL/L (ref 20–32)
CREAT SERPL-MCNC: 0.49 MG/DL (ref 0.52–1.04)
GFR SERPL CREATININE-BSD FRML MDRD: >90 ML/MIN/1.7M2
GLUCOSE SERPL-MCNC: 103 MG/DL (ref 70–99)
POTASSIUM SERPL-SCNC: 4 MMOL/L (ref 3.4–5.3)
SODIUM SERPL-SCNC: 123 MMOL/L (ref 133–144)

## 2018-01-05 PROCEDURE — 80048 BASIC METABOLIC PNL TOTAL CA: CPT | Performed by: INTERNAL MEDICINE

## 2018-01-05 PROCEDURE — 36415 COLL VENOUS BLD VENIPUNCTURE: CPT | Performed by: INTERNAL MEDICINE

## 2018-01-05 PROCEDURE — 99495 TRANSJ CARE MGMT MOD F2F 14D: CPT | Performed by: INTERNAL MEDICINE

## 2018-01-05 RX ORDER — IPRATROPIUM BROMIDE AND ALBUTEROL SULFATE .5; 3 MG/3ML; MG/3ML
1 SOLUTION RESPIRATORY (INHALATION) EVERY 6 HOURS PRN
COMMUNITY

## 2018-01-05 RX ORDER — ALBUTEROL SULFATE 90 UG/1
2 AEROSOL, METERED RESPIRATORY (INHALATION) EVERY 4 HOURS PRN
Qty: 3 INHALER | Refills: 3 | Status: SHIPPED | OUTPATIENT
Start: 2018-01-05

## 2018-01-05 RX ORDER — AMOXICILLIN 250 MG
1 CAPSULE ORAL DAILY
COMMUNITY

## 2018-01-05 RX ORDER — ALBUTEROL SULFATE 0.83 MG/ML
1 SOLUTION RESPIRATORY (INHALATION) EVERY 6 HOURS PRN
COMMUNITY
End: 2018-12-06

## 2018-01-05 RX ORDER — DOXEPIN HYDROCHLORIDE 10 MG/ML
3 SOLUTION ORAL AT BEDTIME
Qty: 30 ML | Refills: 0 | Status: SHIPPED | OUTPATIENT
Start: 2018-01-05 | End: 2018-01-25

## 2018-01-05 NOTE — TELEPHONE ENCOUNTER
Called Melisa and discussed low sodium results. Patient is to present to the ED for further eval and treatment today as soon as able. Melisa verbalized understanding and will take patient in as soon as possible. They will be going to [facility] via private transport. RN CC called [facility] and spoke with Dr. Vance regarding patient, faxed over results.    Daughter will follow up with us upon discharge.    Lorraine Crespo RN  RN Care Coordinator  [facility]  [phone]

## 2018-01-05 NOTE — TELEPHONE ENCOUNTER
[facility] Call Center    Phone Message    Name of Caller: rush Vincent daughter    [phone]    Reason for Call: Other: call back to Dr. Trujillo,  call asap     Action Taken: Message routed to:  Primary Care p [phone]

## 2018-01-05 NOTE — PROGRESS NOTES
SUBJECTIVE:   Nancy Benz is a 89 year old female who presents to clinic today for the following health issues:      Hospital Follow-up Visit:    Hospital/Nursing Home/ Rehab Facility: [facility]  Date of Admission: 12-17-17  Date of Discharge: 12-27-17  Reason(s) for Admission: Took too many Ambien            Problems taking medications regularly:  None       Medication changes since discharge: None       Problems adhering to non-medication therapy:  None    Summary of hospitalization:  CareEverywhere information obtained and reviewed  Diagnostic Tests/Treatments reviewed.  Follow up needed: psychiatry  Other Healthcare Providers Involved in Patient s Care:         Specialist appointment - psychiatry  Update since discharge: fluctuating course.     ===============  Patient came with her  and daughter Melisa to follow-up on 2 hospitalizations.  Her daughter Kady is on speaker phone.  First hospitalization was from 12/6/2017 to 12/12/2017 where she was hospitalized for moderate persistent asthma with severe exacerbation resulting in acute hypoxic respiratory failure.  She was treated with antibiotics steroids and nebulizers and discharged to home.  Second hospitalization was from 12/17/2017 to 12/27/2017.  After the first hospitalization patient had not been quite herself, had more difficulty coping with her health issues.  She overdosed on Ambien resulting in this hospitalization.  She had multiple complications including pulmonary embolism and DVT.  Anticoagulation resulted in GI bleed as well as hemothorax.  Anticoagulation was discontinued.  Patient received blood transfusion.  Patient was evaluated by psychiatry and put on fluoxetine for depression.  Patient has psychiatry follow-up in 2 weeks.    Since discharge from the hospital, patient experience confusion, drugged feeling and slurred speech cycle throughout the day. Some times a better than others. She feels tired. She attributed this to  fluoxetine.  Family has elected to reduce the fluoxetine dose to 20 mg from 40 mg on December 31.  Family member reports slight improvement.  Patient has moments of clarity.  However the patient herself does not feel significantly improved with this dose change.    She continues to struggle with insomnia.  She is on melatonin and doxepin.  Patient does not know how to take the doxepin, could not see the syringe marking and has not been taking that.  Melatonin alone does not appear to help her significantly.  Ambien was discontinued from the hospital.    Family have a lot of question regarding her medications.  We went over all her nebulizers, the indications and how to use them.  Daughter reports that patient has good technique with inhaler use.  But they do not have a spacer.  I reviewed with him how to use inhaler with a spacer.      ROS:  Constitutional, HEENT, cardiovascular, pulmonary, gi and gu systems are negative, except as otherwise noted.    GI: poor appetite, forces herself to eat, no vomiting, some nausea, no constipation or diarrhea. No more black stools and no blood. Drinks 2 x 12 oz bottle of water.   : not urinating as much as she used to. She feels dehydrated.   MSK: left leg DVT, no pain.       Current Outpatient Prescriptions on File Prior to Visit:  pravastatin (PRAVACHOL) 40 MG tablet TAKE 1/2 TABLET EVERY DAY   DILT- MG 24 hr capsule TAKE 1 CAPSULE EVERY DAY   FLUoxetine (PROZAC) 20 MG capsule Take 1 capsule (20 mg) by mouth daily   bimatoprost (LUMIGAN) 0.01 % SOLN Place 1 drop into both eyes At Bedtime   FLOVENT  MCG/ACT Inhaler INHALE 2 PUFFS INTO THE LUNGS 2 TIMES DAILY   brimonidine-timolol (COMBIGAN) 0.2-0.5 % ophthalmic solution Place 1 drop into both eyes 2 times daily   gabapentin (NEURONTIN) 100 MG capsule Take 2 capsules (200 mg) by mouth daily (late afternoon) (Patient taking differently: Take 300 mg by mouth daily (late afternoon))   cycloSPORINE (RESTASIS) 0.05 %  ophthalmic emulsion Place 1 drop into both eyes every 12 hours   polyethylene glycol 0.4%- propylene glycol 0.3% (SYSTANE) 0.4-0.3 % SOLN ophthalmic solution Place 1 drop into both eyes 3 times daily as needed for dry eyes   Calcium Carb-Cholecalciferol (CALCIUM 600 + D) 600-200 MG-UNIT TABS Take 1 tablet by mouth daily Vitamin B=5456 iu   doxepin (SINEQUAN) 10 MG/ML (HIGH CONC) solution Take 0.3 mLs (3 mg) by mouth At Bedtime (Patient not taking: Reported on 1/5/2018)   albuterol (PROAIR HFA/PROVENTIL HFA/VENTOLIN HFA) 108 (90 BASE) MCG/ACT Inhaler Inhale 2 puffs into the lungs every 6 hours as needed for shortness of breath / dyspnea or wheezing   polyethylene glycol (MIRALAX) powder Take 17 g (1 capful) by mouth daily (Patient taking differently: Take 1 capful by mouth daily as needed )     No current facility-administered medications on file prior to visit.       Problem list, Medication list, Allergies, and Medical/Social/Surgical histories reviewed in [facility] and updated as appropriate.    OBJECTIVE:                                                    /72  Pulse 58  Temp 97.1  F (36.2  C) (Temporal)  Wt 118 lb (53.5 kg)  SpO2 98%  BMI 23.05 kg/m2    GEN: Elderly female, alert and no distress; forgetful.  HEENT: unremarkable.  Neck:     supple, no thyromegaly, no cervical lymphadenopathy.   CONCHIS: Positive for Diffuse wheezing throughout the lung fields.  CV:  RRR no murmur.  Intact distal pulses, good cap refill.  Abd: soft, normal active bowel sounds, non tender, non distended. No mass or organomegaly.   Ext: Positive for mild pitting edema in the lower extremities, left side slightly worse than the right.       Diagnostic test results:  Orders Only on 01/01/2018   Component Date Value Ref Range Status     Hemoglobin 01/03/2018 11.8  11.7 - 15.7 g/dL Final            ASSESSMENT/PLAN:                                                      89 year old female with the following diagnoses and treatment  plan:      ICD-10-CM    1. Hospital discharge follow-up Z09    2. Moderate persistent asthma without complication J45.40 Respiratory Therapy Supplies ROBERTO   3. Chronic insomnia F51.04 doxepin (SINEQUAN) 10 MG/ML (HIGH CONC) solution   4. Moderate persistent asthma with exacerbation J45.41 albuterol (PROAIR HFA/PROVENTIL HFA/VENTOLIN HFA) 108 (90 BASE) MCG/ACT Inhaler   5. Hyponatremia E87.1 Basic metabolic panel   6. Other acute pulmonary embolism without acute cor pulmonale (H) I26.99    7. Acute deep vein thrombosis (DVT) of other specified vein of left lower extremity (H) I82.492      --Patient with recent hospital follow-up, continue to go through periods of confusion, slurred speech.  Family is attributing this to fluoxetine.  She has been on the lower dose for only a few days.  Given the long half-life, we will give it more time to clear.  --History of electrolyte derangement, patient is not good with hydration.  Will check a BMP today.  Encourage patient to keep herself hydrated.  --Recent asthma exacerbation.  Patient continues to have wheezing.  She has not been doing Flovent consistently at home.  There have been some confusion about nebulizer use.  We spent majority of our time educating them on the nebulizers, inhalers and proper use.  I recommended that they use Flovent scheduled, albuterol inhaler as needed when she is able.  When she is too tired or too weak to do proper inhaler techniques, they can use nebulizers.  Her current oxygen saturation is adequate.  --Untreated pulmonary embolism and DVT due to severe complication of anticoagulation, GI bleed and hemothorax.  Hemoglobin level is pretty good.  We will have patient consult with pulmonary for optimal management.  I will discuss with them to expedite her appointment.  --Chronic insomnia: Educated on the use of doxepin 0.3 mL's.  Her daughter will help her find the correct marking for this.  --Clinic follow-up in 2 weeks.      Post Discharge  Medication Reconciliation: discharge medications reconciled and changed, per note/orders (see AVS).  Plan of care communicated with patient, , and daughter Kady Vincent (on speaker phone).       Will call or return to clinic if worsening or symptoms not improving as discussed.  See Patient Instructions.    A total of 45 minutes was spent face to face with this patient. More than 50% of the time was spent on education for the above problems and management plans.    Candy Trujillo MD-PhD  [facility]    (Note: Chart documentation was done in part with Dragon Voice Recognition software. Although reviewed after completion, some word and grammatical errors may remain.)           Coding guidelines for this visit:  Type of Medical   Decision Making Face-to-Face Visit       within 7 Days of discharge Face-to-Face Visit        within 14 days of discharge   Moderate Complexity [id number]   High Complexity [id number]

## 2018-01-05 NOTE — MR AVS SNAPSHOT
After Visit Summary   1/5/2018    Nancy Benz    MRN: [id number]           Patient Information     Date Of Birth          8/5/1922        Visit Information        Provider Department      1/5/2018 9:10 AM Candy Trujillo MD PhD [facility]        Today's Diagnoses     Moderate persistent asthma without complication    -  1    Chronic insomnia        Moderate persistent asthma with exacerbation        Influenza due to influenza virus, type B        Hospital discharge follow-up        Hyponatremia          Care Instructions    Make appointment(s) for:   -- get labs today.  -- clinic follow up in 2 weeks.       Additional instructions:  Stop Omeprazole  Use Flovent inhaler with spacer twice a day no matter.   Use Albuterol inhaler with spacer as needed.   If you are not strong enough to use Albuterol inhaler, you can use the Albuterol nebulizer.  Use Doxepin for sleep at night. Ok to use this with Melatonin.             Medication(s) prescribed today:    Orders Placed This Encounter   Medications     doxepin (SINEQUAN) 10 MG/ML (HIGH CONC) solution     Sig: Take 0.3 mLs (3 mg) by mouth At Bedtime     Dispense:  30 mL     Refill:  0     Respiratory Therapy Supplies ROBERTO     Sig: Optichamber or equivalent to use with spacer.     Dispense:  1 each     Refill:  0                   Follow-ups after your visit        Your next 10 appointments already scheduled     Mar 15, 2018  9:00 AM CDT   LAB with LAB FIRST FLOOR [facility] ([facility])    [address] [place] [id number] [phone]           Please do not eat 10-12 hours before your appointment if you are coming in fasting for labs on lipids, cholesterol, or glucose (sugar). This does not apply to pregnant women. Water, hot tea and black coffee (with nothing added) are okay. Do not drink other fluids, diet soda or chew gum.            Mar 15, 2018 10:10 AM CDT   PHYSICAL with Candy  MD Ronnie PhD   [facility]    [address] [place] [id number] [phone]            Apr 03, 2018  9:45 AM CDT   Return Visit with Lee Reyes MD, [facility] NURSE ONLY   [facility]    [address] [place] [address] [phone]              Who to contact     If you have questions or need follow up information about today's clinic visit or your schedule please contact [facility] directly at [phone].  Normal or non-critical lab and imaging results will be communicated to you by Scanbuyhart, letter or phone within 4 business days after the clinic has received the results. If you do not hear from us within 7 days, please contact the clinic through Scanbuyhart or phone. If you have a critical or abnormal lab result, we will notify you by phone as soon as possible.  Submit refill requests through LocaMap or call your pharmacy and they will forward the refill request to us. Please allow 3 business days for your refill to be completed.          Additional Information About Your Visit        Scanbuyhart Information     LocaMap gives you secure access to your electronic health record. If you see a primary care provider, you can also send messages to your care team and make appointments. If you have questions, please call your primary care clinic.  If you do not have a primary care provider, please call [phone] and they will assist you.      LocaMap is an electronic gateway that provides easy, online access to your medical records. With LocaMap, you can request a clinic appointment, read your test results, renew a prescription or communicate with your care team.     To access your existing account, please contact your [facility] or call [phone] for assistance.        Care EveryWhere ID     This is your Care EveryWhere ID. This could be  used by other organizations to access your [facility] medical records  [id number]        Your Vitals Were     Pulse Temperature Pulse Oximetry BMI (Body Mass Index)          58 97.1  F (36.2  C) (Temporal) 98% 23.05 kg/m2         Blood Pressure from Last 3 Encounters:   01/05/18 140/72   10/17/17 126/70   09/11/17 110/68    Weight from Last 3 Encounters:   01/05/18 118 lb (53.5 kg)   09/11/17 120 lb (54.4 kg)   06/14/17 115 lb 6.4 oz (52.3 kg)              We Performed the Following     Basic metabolic panel          Today's Medication Changes          These changes are accurate as of: 1/5/18 10:40 AM.  If you have any questions, ask your nurse or doctor.               Start taking these medicines.        Dose/Directions    Respiratory Therapy Supplies Deja   Used for:  Moderate persistent asthma without complication   Started by:  Candy Trujillo MD PhD        Optichamber or equivalent to use with spacer.   Quantity:  1 each   Refills:  0         These medicines have changed or have updated prescriptions.        Dose/Directions    * albuterol 108 (90 BASE) MCG/ACT Inhaler   Commonly known as:  PROAIR HFA/PROVENTIL HFA/VENTOLIN HFA   This may have changed:  Another medication with the same name was added. Make sure you understand how and when to take each.   Used for:  Asthma exacerbation, Influenza due to influenza virus, type B, Hospital discharge follow-up   Changed by:  Candy Trujillo MD PhD        Dose:  2 puff   Inhale 2 puffs into the lungs every 6 hours as needed for shortness of breath / dyspnea or wheezing   Quantity:  1 Inhaler   Refills:  5       * albuterol 108 (90 BASE) MCG/ACT Inhaler   Commonly known as:  PROAIR HFA/PROVENTIL HFA/VENTOLIN HFA   This may have changed:  You were already taking a medication with the same name, and this prescription was added. Make sure you understand how and when to take each.   Used for:  Moderate persistent asthma with exacerbation   Changed by:  Candy Trujillo MD PhD        Dose:   2 puff   Inhale 2 puffs into the lungs every 4 hours as needed for shortness of breath / dyspnea or wheezing   Quantity:  3 Inhaler   Refills:  3       * doxepin 10 MG/ML (HIGH CONC) solution   Commonly known as:  SINEquan   This may have changed:  Another medication with the same name was added. Make sure you understand how and when to take each.   Used for:  Chronic insomnia   Changed by:  Candy Trujillo MD PhD        Dose:  3 mg   Take 0.3 mLs (3 mg) by mouth At Bedtime   Quantity:  10 mL   Refills:  3       * doxepin 10 MG/ML (HIGH CONC) solution   Commonly known as:  SINEquan   This may have changed:  You were already taking a medication with the same name, and this prescription was added. Make sure you understand how and when to take each.   Used for:  Chronic insomnia   Changed by:  Candy Trujillo MD PhD        Dose:  3 mg   Take 0.3 mLs (3 mg) by mouth At Bedtime   Quantity:  30 mL   Refills:  0       gabapentin 100 MG capsule   Commonly known as:  NEURONTIN   This may have changed:    - how much to take  - additional instructions   Used for:  Restless leg syndrome        Dose:  200 mg   Take 2 capsules (200 mg) by mouth daily (late afternoon)   Quantity:  180 capsule   Refills:  3       polyethylene glycol powder   Commonly known as:  MIRALAX   This may have changed:    - when to take this  - reasons to take this   Used for:  Chronic constipation        Dose:  1 capful   Take 17 g (1 capful) by mouth daily   Quantity:  510 g   Refills:  11       * Notice:  This list has 4 medication(s) that are the same as other medications prescribed for you. Read the directions carefully, and ask your doctor or other care provider to review them with you.         Where to get your medicines      These medications were sent to [facility] - [place] - [address], [address]  [address], [facility] [address]     Phone:  [phone]     albuterol 108 (90 BASE) MCG/ACT Inhaler     doxepin 10 MG/ML (HIGH CONC) solution    Respiratory Therapy Supplies Deja                Primary Care Provider Office Phone # Fax #    Candy Trujillo MD PhD [phone] [address] [facility] [address]        Equal Access to Services     ASHUTOSH FRANCO : Hadii aad ku hadmichelineo Soomaali, waaxda luqadaha, qaybta kaalmada adeegyada, waxling carleyin reyn adeye hoyos la'carloscurtis topete. So [facility] [phone].    ATENCIÓN: Si habla español, tiene a crandall disposición servicios gratuitos de asistencia lingüística. Llame al [phone].    We comply with applicable federal civil rights laws and [place] laws. We do not discriminate on the basis of race, color, national origin, age, disability, sex, sexual orientation, or gender identity.            Thank you!     Thank you for choosing [facility]  for your care. Our goal is always to provide you with excellent care. Hearing back from our patients is one way we can continue to improve our services. Please take a few minutes to complete the written survey that you may receive in the mail after your visit with us. Thank you!             Your Updated Medication List - Protect others around you: Learn how to safely use, store and throw away your medicines at www.disposemymeds.org.          This list is accurate as of: 1/5/18 10:40 AM.  Always use your most recent med list.                   Brand Name Dispense Instructions for use Diagnosis    * albuterol 108 (90 BASE) MCG/ACT Inhaler    PROAIR HFA/PROVENTIL HFA/VENTOLIN HFA    1 Inhaler    Inhale 2 puffs into the lungs every 6 hours as needed for shortness of breath / dyspnea or wheezing    Asthma exacerbation, Influenza due to influenza virus, type B, Hospital discharge follow-up       * albuterol 108 (90 BASE) MCG/ACT Inhaler    PROAIR HFA/PROVENTIL HFA/VENTOLIN HFA    3 Inhaler    Inhale 2 puffs into the lungs every 4 hours as needed for shortness of breath / dyspnea or wheezing    Moderate persistent  asthma with exacerbation       bimatoprost 0.01 % Soln    LUMIGAN    3 Bottle    Place 1 drop into both eyes At Bedtime    Primary open angle glaucoma of both eyes, unspecified glaucoma stage       brimonidine-timolol 0.2-0.5 % ophthalmic solution    COMBIGAN    10 mL    Place 1 drop into both eyes 2 times daily    Primary open angle glaucoma of both eyes, moderate stage       CALCIUM 600 + D 600-200 MG-UNIT Tabs   Generic drug:  Calcium Carb-Cholecalciferol      Take 1 tablet by mouth daily Vitamin I=9326 iu    Keratoconjunctivitis sicca, not specified as Sjogren's, left, Glaucoma       ciclopirox 0.77 % cream    LOPROX    270 g    Apply topically 2 times daily To left big toe twice daily.    Dermatophytosis of nail, Peripheral arterial disease (H)       cycloSPORINE 0.05 % ophthalmic emulsion    RESTASIS    1 Box    Place 1 drop into both eyes every 12 hours    Keratoconjunctivitis sicca, not specified as Sjogren's, left       DILT- MG 24 hr capsule   Generic drug:  diltiazem     90 capsule    TAKE 1 CAPSULE EVERY DAY    Essential hypertension with goal blood pressure less than 140/90       * doxepin 10 MG/ML (HIGH CONC) solution    SINEquan    10 mL    Take 0.3 mLs (3 mg) by mouth At Bedtime    Chronic insomnia       * doxepin 10 MG/ML (HIGH CONC) solution    SINEquan    30 mL    Take 0.3 mLs (3 mg) by mouth At Bedtime    Chronic insomnia       FLOVENT  MCG/ACT Inhaler   Generic drug:  fluticasone     36 g    INHALE 2 PUFFS INTO THE LUNGS 2 TIMES DAILY    Simple chronic bronchitis (H)       FLUoxetine 20 MG capsule    PROzac    90 capsule    Take 1 capsule (20 mg) by mouth daily    Depression with anxiety       furosemide 20 MG tablet    LASIX    30 tablet    Take 1 tablet (20 mg) by mouth daily    Right ankle swelling       gabapentin 100 MG capsule    NEURONTIN    180 capsule    Take 2 capsules (200 mg) by mouth daily (late afternoon)    Restless leg syndrome       GENTEAL SEVERE 0.3 % Gel  ophthalmic gel   Generic drug:  hypromellose      Place 0.1 g (2 drops) into both eyes At Bedtime        OMEPRAZOLE PO      Take 20 mg by mouth every morning        oxybutynin 5 MG tablet    DITROPAN    30 tablet    Take 0.5-1 tablets (2.5-5 mg) by mouth At Bedtime    Urgency incontinence       polyethylene glycol powder    MIRALAX    510 g    Take 17 g (1 capful) by mouth daily    Chronic constipation       pravastatin 40 MG tablet    PRAVACHOL    45 tablet    TAKE 1/2 TABLET EVERY DAY    Hyperlipidemia LDL goal <100       PRESERVISION AREDS 2 PO           Respiratory Therapy Supplies Deja     1 each    Optichamber or equivalent to use with spacer.    Moderate persistent asthma without complication       SYSTANE 0.4-0.3 % Soln ophthalmic solution   Generic drug:  polyethylene glycol 0.4%- propylene glycol 0.3%      Place 1 drop into both eyes 3 times daily as needed for dry eyes    Keratoconjunctivitis sicca, not specified as Sjogren's, left, Glaucoma       * Notice:  This list has 4 medication(s) that are the same as other medications prescribed for you. Read the directions carefully, and ask your doctor or other care provider to review them with you.

## 2018-01-05 NOTE — PATIENT INSTRUCTIONS
Make appointment(s) for:   -- get labs today.  -- clinic follow up in 2 weeks.       Additional instructions:  Stop Omeprazole  Use Flovent inhaler with spacer twice a day no matter.   Use Albuterol inhaler with spacer as needed.   If you are not strong enough to use Albuterol inhaler, you can use the Albuterol nebulizer.  Use Doxepin for sleep at night. Ok to use this with Melatonin.             Medication(s) prescribed today:    Orders Placed This Encounter   Medications     doxepin (SINEQUAN) 10 MG/ML (HIGH CONC) solution     Sig: Take 0.3 mLs (3 mg) by mouth At Bedtime     Dispense:  30 mL     Refill:  0     Respiratory Therapy Supplies ROBERTO     Sig: Optichamber or equivalent to use with spacer.     Dispense:  1 each     Refill:  0

## 2018-01-07 NOTE — PROGRESS NOTES
Results for orders placed or performed in visit on 01/05/18   Basic metabolic panel   Result Value Ref Range    Sodium 123 (L) 133 - 144 mmol/L    Potassium 4.0 3.4 - 5.3 mmol/L    Chloride 87 (L) 94 - 109 mmol/L    Carbon Dioxide 29 20 - 32 mmol/L    Anion Gap 7 3 - 14 mmol/L    Glucose 103 (H) 70 - 99 mg/dL    Urea Nitrogen 11 7 - 30 mg/dL    Creatinine 0.49 (L) 0.52 - 1.04 mg/dL    GFR Estimate >90 >60 mL/min/1.7m2    GFR Estimate If Black >90 >60 mL/min/1.7m2    Calcium 8.2 (L) 8.5 - 10.1 mg/dL       Patient and family were notified profound hyponatremia, which is likely the cause of her confusion.  I recommended inpatient management.  Her daughter will take the patient to the hospital.    Candy Trujillo MD PhD

## 2018-01-10 ENCOUNTER — CARE COORDINATION (OUTPATIENT)
Dept: CARE COORDINATION | Facility: CLINIC | Age: 83
End: 2018-01-10

## 2018-01-10 NOTE — LETTER
On license of [facility]  Complex Care Plan  About Me  Patient Name:  Nancy Benz    YOB: 1922  Age:     89 year old   [place] MRN:   [id number] Telephone Information:    Home Phone [phone]   Mobile none       Address:    [address] [place] [address] Email address:  [email or web address]      Emergency Contact(s)  Name Relationship Lgl Grd Work Phone Home Phone Mobile Phone   1. FABIÁN MARSH Daughter No none [phone]   2. STEPHANY WHITEHEAD Daughter No none [phone]           Primary language:  English     needed? No   [place] [facility]:  [phone] op. 1  Other communication barriers: No  Preferred Method of Communication:  Phone  Current living arrangement: I live in a private home  Mobility Status/ Medical Equipment:    Other information to know about me:    Health Maintenance  Health Maintenance Reviewed:      My Access Plan  Medical Emergency 911   Primary Clinic [place] [facility] [phone]   24 Hour Appointment Line [phone] or  [phone]) (toll-free)   24 Hour Nurse Line [phone] (toll-free)   Preferred Urgent Care     [facility]  [phone]   Preferred Pharmacy [facility] Mail Delivery - [address]     Behavioral Health Crisis Line The National Suicide Prevention Lifeline at 1-829.624.2437 or 911     My Care Team Members  Patient Care Team       Relationship Specialty Notifications Start End    Candy Trujillo MD PhD PCP - General Internal Medicine  11/27/15     Phone: [phone] Fax: [phone] [address] [facility] [address]    Flor Parker, RN Nurse Coordinator  Admissions 9/28/16     Comment:  Endocrine, [phone]    Lorraine Crespo, RN Nurse Coordinator Family Practice Admissions 1/5/18     Comment:  [phone]         My Care Plans  Self  Management and Treatment Plan  Goals and (Comments)  Goal #1: Needs hospital follow up visit, this is scheduled for 1/18/18 with Dr. Trujillo.      Action Plans on File: None  Advance Care Plans/Directives Type:   Type Advanced Care Plans/Directives: Advanced Directive - On File, POLST    My Medical and Care Information  Problem List   Patient Active Problem List   Diagnosis     Glaucoma     Keratoconjunctivitis sicca, not specified as Sjogren's, left     Cataract     Pseudophakia of both eyes     Presbyopia     Monoclonal gammopathy     Nonrheumatic aortic valve insufficiency     Dizziness     History of vertebral fracture     Nocturnal oxygen desaturation     Urgency incontinence     Hyperlipidemia LDL goal <130     Depression with anxiety     Restless leg syndrome     Mild persistent asthma without complication     ACP (advance care planning)     Chronic constipation     Prediabetes     Simple chronic bronchitis (H)     Senile osteoporosis     Essential hypertension      Current Medications and Allergies:  See printed Medication Report.    Care Coordination Start Date: 01/10/18   Frequency of Care Coordination:  as needed   Form Last Updated: 01/10/2018

## 2018-01-10 NOTE — LETTER
[facility]ARE [facility]ORDIN[facility]  [address] [place]  [address]      January 10, 2018      Nancy Benz  [address] [address] [place] [address]      Dear Nancy,    I am a clinic care coordinator who works with Candy Trujillo MD PhD at [facility]. I wanted to thank you and your daughters for spending the time to talk with me.  I wanted to introduce myself and provide you with my contact information so that you can call me with questions or concerns about your health care. Below is a description of clinic care coordination and how I can further assist you.     The clinic care coordinator is a registered nurse and/or  who understand the health care system. The goal of clinic care coordination is to help you manage your health and improve access to the [facility] system in the most efficient manner. The registered nurse can assist you in meeting your health care goals by providing education, coordinating services, and strengthening the communication among your providers. The  can assist you with financial, behavioral, psychosocial, chemical dependency, counseling, and/or psychiatric resources.    Please feel free to contact me at [phone], with any questions or concerns. We at [facility] are focused on providing you with the highest-quality healthcare experience possible and that all starts with you.     Sincerely,     Lorraine Crespo RN    Enclosed: I have enclosed a copy of the Complex Care Plan. This has helpful information and goals that we have talked about. Please keep this in an easy to access place to use as needed.

## 2018-01-10 NOTE — PROGRESS NOTES
Clinic Care Coordination Contact    Referral Source: IP/TCU Report  Clinical Data: Care Coordinator Outreach  Daughter, Melisa is calling stating that the patient has been discharged from [facility] to home with [facility] services. A hospital follow up appointment has been scheduled with Dr. Trujillo for 1/18/18. Patient seems to be doing ok at home. Melisa wanted to verify the fluoxetine dose we have on file. This was reviewed. No further questions or concerns at this time.    Plan: Care Coordinator will mail out complex care plan along with most current medication list on file. Care Coordinator will follow up with the family in about 1-2 weeks to see how things are going.      Lorraine Crespo, RN  RN Care Coordinator  [facility] Care  [phone]

## 2018-01-11 ENCOUNTER — DOCUMENTATION ONLY (OUTPATIENT)
Dept: CARE COORDINATION | Facility: CLINIC | Age: 83
End: 2018-01-11

## 2018-01-11 NOTE — PROGRESS NOTES
Dear Nancy,   Here are your recent results that we reviewed at your recent clinic visit.     Please call or Mychart if you have further questions.     Candy Trujillo MD-PhD

## 2018-01-11 NOTE — PROGRESS NOTES
Verbal order approval for the requested orders indicated below.     Home care RN to draw hemoglobin and BMP today

## 2018-01-11 NOTE — TELEPHONE ENCOUNTER
No drug interactions found on micromedix for mucinex and prozac.    Patient informed no interaction between the medication, ok to take.    Katie Prater RN,   [facility]       Abnormal stress test

## 2018-01-11 NOTE — PROGRESS NOTES
[facility] now requests orders and shares plan of care/discharge summaries for some patients through [facility].  Please REPLY TO THIS MESSAGE in order to give authorization for orders when needed.  This is considered a verbal order, you will still receive a faxed copy of orders for signature.  Thank you for your assistance in improving collaboration for our patients.    ORDER  SN 1w1, 2w3, 1w5, 3 prn  PT eval and treat  OT eval and treat  HHA 1w9    Pt to be treated after recent hospitalization for low sodium levels, and recent resp failure.    Please advise  Pt family was also wondering if we need orders for hgb and sodium level check. If you would like us to drawn these labs in the home we will just need a verbal ok in epic and I can complete along with homecare orders.    Thank you.    Tricia Mchugh RN  [phone]  [email or web address]

## 2018-01-11 NOTE — PROGRESS NOTES
Spoke [facility] nurse Tricia Mchugh RN - informed her of the verbal okay for the homecare orders she had requested for the patient. Tricia has already left the patients home for the day, and will return to get the hgb and BMP next visit.     No further questions or concerns at this time.     Routing to Tricia Mchugh RN.     Evelin Esqueda RN

## 2018-01-12 DIAGNOSIS — Z53.9 DIAGNOSIS NOT YET DEFINED: Primary | ICD-10-CM

## 2018-01-12 LAB
HGB BLD-MCNC: 12.4 G/DL (ref 11.7–15.7)
SODIUM SERPL-SCNC: 134 MMOL/L (ref 133–144)

## 2018-01-12 PROCEDURE — 85018 HEMOGLOBIN: CPT | Performed by: INTERNAL MEDICINE

## 2018-01-12 PROCEDURE — G0180 MD CERTIFICATION HHA PATIENT: HCPCS | Performed by: INTERNAL MEDICINE

## 2018-01-12 PROCEDURE — 84295 ASSAY OF SERUM SODIUM: CPT | Performed by: INTERNAL MEDICINE

## 2018-01-14 NOTE — PROGRESS NOTES
Dr. Ronnie Villalta Is out of the office and we are reviewing your results for you.  Please follow up if further concerns or questions     Attached are your test results.  -Hemoglobin is normal.  There is no evidence of anemia.  -Sodium is normal.   Please contact us if you have any questions.    Teresa De Anda, CNP

## 2018-01-16 ENCOUNTER — DOCUMENTATION ONLY (OUTPATIENT)
Dept: CARE COORDINATION | Facility: CLINIC | Age: 83
End: 2018-01-16

## 2018-01-17 NOTE — PROGRESS NOTES
[facility] now requests orders and shares plan of care/discharge summaries for some patients through [employer].  Please REPLY TO THIS MESSAGE in order to give authorization for orders when needed.  This is considered a verbal order, you will still receive a faxed copy of orders for signature.  Thank you for your assistance in improving collaboration for our patients.    ORDER    OT eval and treat one additional visit. The plan will also include falls prevention plan,  monitor skin integrity, monitor for s/s of depression,  to achieve the following goals, in 1 visit  Pt will demonstrate PLB and verbalize importance of exercises to build endurence.   Pt will demo safe shower transfer  SPouse will verbalize high protein foods pt can eat to increase energy levels.

## 2018-01-18 ENCOUNTER — OFFICE VISIT (OUTPATIENT)
Dept: PEDIATRICS | Facility: CLINIC | Age: 83
End: 2018-01-18
Payer: COMMERCIAL

## 2018-01-18 VITALS
BODY MASS INDEX: 22.26 KG/M2 | SYSTOLIC BLOOD PRESSURE: 132 MMHG | TEMPERATURE: 96.6 F | HEART RATE: 73 BPM | OXYGEN SATURATION: 96 % | WEIGHT: 114 LBS | DIASTOLIC BLOOD PRESSURE: 64 MMHG

## 2018-01-18 DIAGNOSIS — H40.1130 PRIMARY OPEN ANGLE GLAUCOMA OF BOTH EYES, UNSPECIFIED GLAUCOMA STAGE: ICD-10-CM

## 2018-01-18 DIAGNOSIS — Z09 HOSPITAL DISCHARGE FOLLOW-UP: Primary | ICD-10-CM

## 2018-01-18 DIAGNOSIS — R53.81 PHYSICAL DECONDITIONING: ICD-10-CM

## 2018-01-18 DIAGNOSIS — D50.9 IRON DEFICIENCY ANEMIA, UNSPECIFIED IRON DEFICIENCY ANEMIA TYPE: ICD-10-CM

## 2018-01-18 DIAGNOSIS — E87.1 HYPONATREMIA: ICD-10-CM

## 2018-01-18 DIAGNOSIS — K27.9 PEPTIC ULCER: ICD-10-CM

## 2018-01-18 DIAGNOSIS — F51.04 CHRONIC INSOMNIA: ICD-10-CM

## 2018-01-18 DIAGNOSIS — J45.30 MILD PERSISTENT ASTHMA WITHOUT COMPLICATION: ICD-10-CM

## 2018-01-18 DIAGNOSIS — H40.1132 PRIMARY OPEN ANGLE GLAUCOMA OF BOTH EYES, MODERATE STAGE: ICD-10-CM

## 2018-01-18 PROCEDURE — 99495 TRANSJ CARE MGMT MOD F2F 14D: CPT | Performed by: INTERNAL MEDICINE

## 2018-01-18 RX ORDER — DOXEPIN 3 MG/1
3 TABLET, FILM COATED ORAL AT BEDTIME
Qty: 30 TABLET | Refills: 0 | Status: SHIPPED | OUTPATIENT
Start: 2018-01-18 | End: 2018-01-25

## 2018-01-18 RX ORDER — PANTOPRAZOLE SODIUM 40 MG/1
40 TABLET, DELAYED RELEASE ORAL DAILY
Qty: 90 TABLET | Refills: 3 | COMMUNITY
Start: 2018-01-18 | End: 2018-01-18

## 2018-01-18 RX ORDER — PANTOPRAZOLE SODIUM 40 MG/1
40 TABLET, DELAYED RELEASE ORAL DAILY
Qty: 90 TABLET | Refills: 3 | Status: SHIPPED | OUTPATIENT
Start: 2018-01-18 | End: 2018-04-05

## 2018-01-18 NOTE — TELEPHONE ENCOUNTER
Provider: Amy    refill request from: daughter/ [employer]    Medication request: Lumigan and combigan  Prescription written: 9/1/17 and 8/9/17  Last filled: ?  Last Qty: ?    Pt's last office visit: 10/3/17  Next scheduled office visit: 4/3/18  Ok to fill  Janeth WU [facility]

## 2018-01-18 NOTE — PROGRESS NOTES
SUBJECTIVE:   Nancy Benz is a 89 year old female who presents to clinic today for the following health issues:          Hospital Follow-up Visit:    [facility]ing [facility]  Date of Admission: 1-5-18  Date of Discharge: 1-9-18  Reason(s) for Admission: Low sodium            Problems taking medications regularly:  None       Medication changes since discharge: None       Problems adhering to non-medication therapy:  None    Summary of hospitalization:  CareEverywhere information obtained and reviewed  Diagnostic Tests/Treatments reviewed.  Follow up needed: none  Other Healthcare Providers Involved in Patient s Care:         None  Update since discharge: improved.     I saw the patient for hospital follow up on 1/5/18, noted Sodium of 121 and sent her back for readmission. She received IV fluids and salt tablets. Eventually able to get her sodium back to normal. The hospital felt Prozac going from 20 to 40 mg was the culprit for the low sodium. Pt has been on 20 mg dose for many years. It was increased in a prior hospitalization involving Ambien overdose. Last December.     Since discharge, pt reports feeling about the same. Has home care nurse, PT/OT but she had declined future visits. She continues to have drugged feeling, periods of lucidity and periods of confusion. We had recheck labs on 1/12/18, sodium and hemoglobin were normal.     Her main complaint now is difficulty with sleep. She used to take Ambien. After the overdose, doxepin was given. It had worked but it was the liquid form. She didn't want to be dependent on it and didn't take it for sometime. Also because it is liquid, home are RN has not been putting out this medications. Daughter isn't sure the best way to get this out at night time for her. She is to take 3 mg (0.3 ml) at bedtime. Pharmacist recommended diluting it in 1/4 cup of water.     She had a bout of asthma exacerbation back in December, which is what has  set her back health wise. Family has been anxious to get her to see pulmonary for optimize lung function. Currently she denies shortness of breath. Uses Flovent bid daily, and albuterol inhaler prn. Family has questions about the duoneb and the albuterol neb given them from the prior hospital discharge. Went over these again. The neb is when she has exacerbation.       Current Outpatient Prescriptions on File Prior to Visit:  Multiple Vitamins-Minerals (PRESERVISION AREDS 2 PO)    senna-docusate (SENOKOT-S;PERICOLACE) 8.6-50 MG per tablet Take 1 tablet by mouth daily   pravastatin (PRAVACHOL) 40 MG tablet TAKE 1/2 TABLET EVERY DAY   DILT- MG 24 hr capsule TAKE 1 CAPSULE EVERY DAY   FLUoxetine (PROZAC) 20 MG capsule Take 1 capsule (20 mg) by mouth daily   FLOVENT  MCG/ACT Inhaler INHALE 2 PUFFS INTO THE LUNGS 2 TIMES DAILY   gabapentin (NEURONTIN) 100 MG capsule Take 2 capsules (200 mg) by mouth daily (late afternoon) (Patient taking differently: Take 300 mg by mouth daily (late afternoon))   cycloSPORINE (RESTASIS) 0.05 % ophthalmic emulsion Place 1 drop into both eyes every 12 hours   polyethylene glycol (MIRALAX) powder Take 17 g (1 capful) by mouth daily (Patient taking differently: Take 1 capful by mouth daily as needed )   polyethylene glycol 0.4%- propylene glycol 0.3% (SYSTANE) 0.4-0.3 % SOLN ophthalmic solution Place 1 drop into both eyes 3 times daily as needed for dry eyes   Calcium Carb-Cholecalciferol (CALCIUM 600 + D) 600-200 MG-UNIT TABS Take 1 tablet by mouth daily Vitamin D=3437 iu   doxepin (SINEQUAN) 10 MG/ML (HIGH CONC) solution Take 0.3 mLs (3 mg) by mouth At Bedtime   Respiratory Therapy Supplies ROBERTO Optichamber or equivalent to use with spacer.   albuterol (PROAIR HFA/PROVENTIL HFA/VENTOLIN HFA) 108 (90 BASE) MCG/ACT Inhaler Inhale 2 puffs into the lungs every 4 hours as needed for shortness of breath / dyspnea or wheezing   albuterol (2.5 MG/3ML) 0.083% neb solution Take 1 vial  by nebulization every 6 hours as needed for shortness of breath / dyspnea or wheezing   ipratropium - albuterol 0.5 mg/2.5 mg/3 mL (DUONEB) 0.5-2.5 (3) MG/3ML Take 1 vial by nebulization every 6 hours as needed for shortness of breath / dyspnea or wheezing     No current facility-administered medications on file prior to visit.     Past Medical History:   Diagnosis Date     Arthritis      Bilateral presbyopia      Cataract 2001/2007     COPD (chronic obstructive pulmonary disease) (H)      Depressive disorder      Essential hypertension 9/11/2017     Monoclonal gammopathy      Nonrheumatic aortic valve insufficiency      Open-angle glaucoma 1969    both eyes     Uncomplicated asthma       Past Surgical History:   Procedure Laterality Date     CATARACT IOL, RT/LT       PHACOEMULSIFICATION CLEAR CORNEA WITH STANDARD INTRAOCULAR LENS IMPLANT Left 7/10/07     PHACOEMULSIFICATION WITH INTRAOCULAR LENS IMPLANT, TRABECULECTOMY, COMBINED Right 4/30/01     REPAIR PTOSIS Bilateral 9/12/2016    Procedure: REPAIR PTOSIS;  Surgeon: Deyvi Lei MD;  Location: [facility] OR     REPAIR PTOSIS BROW Left 9/12/2016    Procedure: REPAIR PTOSIS BROW;  Surgeon: Deyvi Lei MD;  Location: [facility] OR     Family History   Problem Relation Age of Onset     Glaucoma Son      Glaucoma Daughter       Social History   Substance Use Topics     Smoking status: Never Smoker     Smokeless tobacco: Never Used     Alcohol use Yes       Problem list, Medication list, Allergies, and Medical/Social/Surgical histories reviewed in [facility] and updated as appropriate.    OBJECTIVE:                                                    /64  Pulse 73  Temp 96.6  F (35.9  C) (Temporal)  Wt 114 lb (51.7 kg)  SpO2 96%  BMI 22.26 kg/m2    GEN: frail elderly femlae, alert and no distress  HEENT: unremarkable.  Neck:     supple, no thyromegaly, no cervical lymphadenopathy.   CONCHIS:  CTA B/L, no wheezing or crackles.  CV:  RRR no murmur.  Intact distal pulses,  "good cap refill.  Abd: soft, normal active bowel sounds, non tender, non distended. No mass or organomegaly.   Ext:  no cyanosis, clubbing or edema.         Diagnostic test results:  No results found for this or any previous visit (from the past 24 hour(s)).       ASSESSMENT/PLAN:                                                      89 year old female with the following diagnoses and treatment plan:      ICD-10-CM    1. Hospital discharge follow-up Z09 HOME CARE NURSING REFERRAL   2. Chronic insomnia F51.04 doxepin (SILENOR) 3 MG tablet   3. Hyponatremia E87.1 Sodium   4. Iron deficiency anemia, unspecified iron deficiency anemia type D50.9 Hemoglobin   5. Peptic ulcer K27.9 pantoprazole (PROTONIX) 40 MG EC tablet     DISCONTINUED: pantoprazole (PROTONIX) 40 MG EC tablet   6. Physical deconditioning R53.81 HOME CARE NURSING REFERRAL       -- pt is stable currently.  Patient does not want to change prozac, her \"happy pill\" of many years. We'll leave her at the 20 mg dose. Will have home care nurse recheck sodium/hgb next week to ensure stability.  -- insomnia: doxepin works for her. Will send the tablet form to see if this is covered by her insurance. This will make it easier for the family.   -- refilled protonix.   -- she is deconditioned. She will benefit from ongoing PT. New referral made for this.   -- her respiratory status is quite stable. I will connect with pulmonary to see if a reasonable appointment time can be given. She is doing well enough that it is not an emergency.   -- follow up in 2 weeks.       Will call or return to clinic if worsening or symptoms not improving as discussed.  See Patient Instructions.    A total of 45 minutes was spent face to face with this patient. More than 50% of the time was spent on education for the above problems and management plans.       Candy Trujillo MD-PhD  [facility]    (Note: Chart documentation was done in part with Dragon Voice Recognition software. " Although reviewed after completion, some word and grammatical errors may remain.)      Post Discharge Medication Reconciliation: discharge medications reconciled, continue medications without change.  Plan of care communicated with patient and daughter Melisa     Coding guidelines for this visit:  Type of Medical   Decision Making Face-to-Face Visit       within 7 Days of discharge Face-to-Face Visit        within 14 days of discharge   Moderate Complexity [id number] [id number]   High Complexity 51038 [address]

## 2018-01-18 NOTE — TELEPHONE ENCOUNTER
[facility] Call Center    Phone Message    Name of Caller: Melisa Person   Phone Number: Other phone number:  [phone]    Best time to return call: any    May a detailed message be left on voicemail: yes    Relation to patient: Other Name: Melisa  Relationship: daughter  Is there legal documentation in chart to discuss information with this person: Yes. Legal Documentation is verified by ks.      Reason for Call: Medication Refill Request    Has the patient contacted the pharmacy for the refill? Yes   Name of medication being requested: combigan  And lumigan  eyedrops  Provider who prescribed the medication: Amy  Pharmacy: [facility] mail order fax [phone]  Date medication is needed: time for refill, takes 3 weeks.  [facility] sent message needing new prescription for both of these medications.            Action Taken: Message routed to:  Adult Clinics: Eye p [phone]

## 2018-01-18 NOTE — PATIENT INSTRUCTIONS
Make appointment(s) for:   -- clinic follow up in 2 weeks.         Medication(s) prescribed today:    Orders Placed This Encounter   Medications     doxepin (SILENOR) 3 MG tablet     Sig: Take 1 tablet (3 mg) by mouth At Bedtime     Dispense:  30 tablet     Refill:  0     DISCONTD: pantoprazole (PROTONIX) 40 MG EC tablet     Sig: Take 1 tablet (40 mg) by mouth daily Take 30-60 minutes before a meal.     Dispense:  90 tablet     Refill:  3     pantoprazole (PROTONIX) 40 MG EC tablet     Sig: Take 1 tablet (40 mg) by mouth daily Take 30-60 minutes before a meal.     Dispense:  90 tablet     Refill:  3

## 2018-01-18 NOTE — MR AVS SNAPSHOT
After Visit Summary   1/18/2018    Nancy Benz    MRN: [id number]           Patient Information     Date Of Birth          8/5/1922        Visit Information        Provider Department      1/18/2018 2:10 PM Candy Trujillo MD PhD [facility]        Today's Diagnoses     Hyponatremia    -  1    Chronic insomnia        Iron deficiency anemia, unspecified iron deficiency anemia type        Peptic ulcer        Hospital discharge follow-up        Physical deconditioning          Care Instructions    Make appointment(s) for:   -- clinic follow up in 2 weeks.         Medication(s) prescribed today:    Orders Placed This Encounter   Medications     doxepin (SILENOR) 3 MG tablet     Sig: Take 1 tablet (3 mg) by mouth At Bedtime     Dispense:  30 tablet     Refill:  0     DISCONTD: pantoprazole (PROTONIX) 40 MG EC tablet     Sig: Take 1 tablet (40 mg) by mouth daily Take 30-60 minutes before a meal.     Dispense:  90 tablet     Refill:  3     pantoprazole (PROTONIX) 40 MG EC tablet     Sig: Take 1 tablet (40 mg) by mouth daily Take 30-60 minutes before a meal.     Dispense:  90 tablet     Refill:  3                   Follow-ups after your visit        Additional Services     HOME CARE NURSING REFERRAL       **Order classes of: [place] [facility], [facility] and [facility] will route to the Home Care and Hospice Referral Pool.  Home Care or Hospice will then contact the patient to schedule their appointment.**    If you do not hear from Home Care and Hospice, or you would like to call to schedule, please call the referring place of service that your provider has listed below.  ______________________________________________________________________    Your provider has referred you to: [facility]: [facility] [place] [phone]   [email or web address]    Extended Emergency Contact Information  Primary Emergency Contact: Kady Bellamy  Address: [id number]  [place] [address] [facility]  Home Phone: [phone]  Work Phone: none  Mobile Phone: [phone]  Relation: Daughter  Hearing or visual needs: None  Other needs: None  Preferred language: English   needed? No  Secondary Emergency Contact: Melisa Flores  Address: [address] [place] [address] [facility]  Home Phone: [phone]  Work Phone: none  Mobile Phone: [phone]  Relation: Daughter  Hearing or visual needs: None  Other needs: None  Preferred language: English   needed? No    Patient Anticipated Discharge Date:    RN, PT, HHA to begin 24 - 48 hours after discharge.  PLEASE EVALUATE AND TREAT (Evaluation timeline is 24 - 48 hrs. Please call if there is need for a variance to this timeline).    REASON FOR REFERRAL: Assessment & Treatment: PT    ADDITIONAL SERVICES NEEDED: OT    OTHER PERTINENT INFORMATION: Patient was last seen by provider on 1/18/2018 for hospital follow up and deconditioning    Current Outpatient Prescriptions:  doxepin (SILENOR) 3 MG tablet, Take 1 tablet (3 mg) by mouth At Bedtime, Disp: 30 tablet, Rfl: 0  pantoprazole (PROTONIX) 40 MG EC tablet, Take 1 tablet (40 mg) by mouth daily Take 30-60 minutes before a meal., Disp: 90 tablet, Rfl: 3  Multiple Vitamins-Minerals (PRESERVISION AREDS 2 PO), , Disp: , Rfl:   senna-docusate (SENOKOT-S;PERICOLACE) 8.6-50 MG per tablet, Take 1 tablet by mouth daily, Disp: , Rfl:   pravastatin (PRAVACHOL) 40 MG tablet, TAKE 1/2 TABLET EVERY DAY, Disp: 45 tablet, Rfl: 0  DILT- MG 24 hr capsule, TAKE 1 CAPSULE EVERY DAY, Disp: 90 capsule, Rfl: 0  FLUoxetine (PROZAC) 20 MG capsule, Take 1 capsule (20 mg) by mouth daily, Disp: 90 capsule, Rfl: 1  bimatoprost (LUMIGAN) 0.01 % SOLN, Place 1 drop into both eyes At Bedtime, Disp: 3 Bottle, Rfl: 1  FLOVENT  MCG/ACT Inhaler, INHALE 2 PUFFS INTO THE LUNGS 2 TIMES DAILY, Disp: 36 g, Rfl: 1  brimonidine-timolol (COMBIGAN) 0.2-0.5 %  ophthalmic solution, Place 1 drop into both eyes 2 times daily, Disp: 10 mL, Rfl: 3  gabapentin (NEURONTIN) 100 MG capsule, Take 2 capsules (200 mg) by mouth daily (late afternoon) (Patient taking differently: Take 300 mg by mouth daily (late afternoon)), Disp: 180 capsule, Rfl: 3  cycloSPORINE (RESTASIS) 0.05 % ophthalmic emulsion, Place 1 drop into both eyes every 12 hours, Disp: 1 Box, Rfl: 10  polyethylene glycol (MIRALAX) powder, Take 17 g (1 capful) by mouth daily (Patient taking differently: Take 1 capful by mouth daily as needed ), Disp: 510 g, Rfl: 11  polyethylene glycol 0.4%- propylene glycol 0.3% (SYSTANE) 0.4-0.3 % SOLN ophthalmic solution, Place 1 drop into both eyes 3 times daily as needed for dry eyes, Disp: , Rfl:   Calcium Carb-Cholecalciferol (CALCIUM 600 + D) 600-200 MG-UNIT TABS, Take 1 tablet by mouth daily Vitamin V=8166 iu, Disp: , Rfl:   doxepin (SINEQUAN) 10 MG/ML (HIGH CONC) solution, Take 0.3 mLs (3 mg) by mouth At Bedtime, Disp: 30 mL, Rfl: 0  Respiratory Therapy Supplies ROBERTO, Optichamber or equivalent to use with spacer., Disp: 1 each, Rfl: 0  albuterol (PROAIR HFA/PROVENTIL HFA/VENTOLIN HFA) 108 (90 BASE) MCG/ACT Inhaler, Inhale 2 puffs into the lungs every 4 hours as needed for shortness of breath / dyspnea or wheezing, Disp: 3 Inhaler, Rfl: 3  albuterol (2.5 MG/3ML) 0.083% neb solution, Take 1 vial by nebulization every 6 hours as needed for shortness of breath / dyspnea or wheezing, Disp: , Rfl:   ipratropium - albuterol 0.5 mg/2.5 mg/3 mL (DUONEB) 0.5-2.5 (3) MG/3ML, Take 1 vial by nebulization every 6 hours as needed for shortness of breath / dyspnea or wheezing, Disp: , Rfl:   [DISCONTINUED] doxepin (SINEQUAN) 10 MG/ML (HIGH CONC) solution, Take 0.3 mLs (3 mg) by mouth At Bedtime (Patient not taking: Reported on 1/5/2018), Disp: 10 mL, Rfl: 3  [DISCONTINUED] albuterol (PROAIR HFA/PROVENTIL HFA/VENTOLIN HFA) 108 (90 BASE) MCG/ACT Inhaler, Inhale 2 puffs into the lungs every 6  hours as needed for shortness of breath / dyspnea or wheezing, Disp: 1 Inhaler, Rfl: 5      Patient Active Problem List:     Glaucoma     Keratoconjunctivitis sicca, not specified as Sjogren's, left     Cataract     Pseudophakia of both eyes     Presbyopia     Monoclonal gammopathy     Nonrheumatic aortic valve insufficiency     Dizziness     History of vertebral fracture     Nocturnal oxygen desaturation     Urgency incontinence     Hyperlipidemia LDL goal <130     Depression with anxiety     Restless leg syndrome     Mild persistent asthma without complication     ACP (advance care planning)     Chronic constipation     Prediabetes     Simple chronic bronchitis (H)     Senile osteoporosis     Essential hypertension      Documentation of Face to Face and Certification for Home Health Services    I certify that patient, Nancy Benz is under my care and that I, or a Nurse Practitioner or Physician's Assistant working with me, had a face-to-face encounter that meets the physician face-to-face encounter requirements with this patient on: 1/18/2018.    This encounter with the patient was in whole, or in part, for the following medical condition, which is the primary reason for Home Health Care: yes.    I certify that, based on my findings, the following services are medically necessary Home Health Services: Occupational Therapy and Physical Therapy    My clinical findings support the need for the above services because: Occupational Therapy Services are needed to assess and treat cognitive ability and address ADL safety due to impairment in confusion. and Physical Therapy Services are needed to assess and treat the following functional impairments: deconditioning    Further, I certify that my clinical findings support that this patient is homebound (i.e. absences from home require considerable and taxing effort and are for medical reasons or [employer] services or infrequently or of short duration when for other  reasons) because: Requires assistance of another person or specialized equipment to access medical services because patient: Range of motion limitations prevents ability to exit home safely..    Based on the above findings, I certify that this patient is confined to the home and needs intermittent skilled nursing care, physical therapy and/or speech therapy.  The patient is under my care, and I have initiated the establishment of the plan of care.  This patient will be followed by a physician who will periodically review the plan of care.    Physician/Provider to provide follow up care: Candy Trujillo certified Physician at time of discharge:     Please be aware that coverage of these services is subject to the terms and limitations of your health insurance plan.  Call member services at your health plan with any benefit or coverage questions.                  Your next 10 appointments already scheduled     Mar 15, 2018  9:00 AM CDT   LAB with LAB FIRST FLOOR [facility] ([facility])    [id number] [place] [id number] [phone]           Please do not eat 10-12 hours before your appointment if you are coming in fasting for labs on lipids, cholesterol, or glucose (sugar). This does not apply to pregnant women. Water, hot tea and black coffee (with nothing added) are okay. Do not drink other fluids, diet soda or chew gum.            Mar 15, 2018 10:10 AM CDT   PHYSICAL with Candy Trujillo MD PhD   [facility] ([facility])    [id number] [place] [address] [phone]            Apr 03, 2018  9:45 AM CDT   Return Visit with Lee Reyes MD, [facility] NURSE ONLY   [facility] ([facility])    [address] [place] [address] [phone]              Future tests that were ordered for you today     Open Future Orders        Priority  Expected Expires Ordered    Sodium Routine  1/31/2018 1/18/2018    Hemoglobin Routine  1/31/2018 1/18/2018            Who to contact     If you have questions or need follow up information about today's clinic visit or your schedule please contact [facility] directly at [phone].  Normal or non-critical lab and imaging results will be communicated to you by MyChart, letter or phone within 4 business days after the clinic has received the results. If you do not hear from us within 7 days, please contact the clinic through [employer] or phone. If you have a critical or abnormal lab result, we will notify you by phone as soon as possible.  Submit refill requests through [employer] or call your pharmacy and they will forward the refill request to us. Please allow 3 business days for your refill to be completed.          Additional Information About Your Visit        MyChart Information     [employer] gives you secure access to your electronic health record. If you see a primary care provider, you can also send messages to your care team and make appointments. If you have questions, please call your primary care clinic.  If you do not have a primary care provider, please call [phone] and they will assist you.      [employer] is an electronic gateway that provides easy, online access to your medical records. With [employer], you can request a clinic appointment, read your test results, renew a prescription or communicate with your care team.     To access your existing account, please contact your [facility] or call [phone] for assistance.        Care EveryWhere ID     This is your Care EveryWhere ID. This could be used by other organizations to access your [place] medical records  [id number]        Your Vitals Were     Pulse Temperature Pulse Oximetry BMI (Body Mass Index)          73 96.6  F (35.9  C) (Temporal) 96% 22.26 kg/m2         Blood Pressure from Last 3  Encounters:   01/18/18 132/64   01/05/18 140/72   10/17/17 126/70    Weight from Last 3 Encounters:   01/18/18 114 lb (51.7 kg)   01/05/18 118 lb (53.5 kg)   09/11/17 120 lb (54.4 kg)              We Performed the Following     HOME CARE NURSING REFERRAL          Today's Medication Changes          These changes are accurate as of: 1/18/18  3:14 PM.  If you have any questions, ask your nurse or doctor.               Start taking these medicines.        Dose/Directions    doxepin 3 MG tablet   Commonly known as:  SILENOR   Used for:  Chronic insomnia   Started by:  Candy Trujillo MD PhD        Dose:  3 mg   Take 1 tablet (3 mg) by mouth At Bedtime   Quantity:  30 tablet   Refills:  0         These medicines have changed or have updated prescriptions.        Dose/Directions    gabapentin 100 MG capsule   Commonly known as:  NEURONTIN   This may have changed:    - how much to take  - additional instructions   Used for:  Restless leg syndrome        Dose:  200 mg   Take 2 capsules (200 mg) by mouth daily (late afternoon)   Quantity:  180 capsule   Refills:  3       polyethylene glycol powder   Commonly known as:  MIRALAX   This may have changed:    - when to take this  - reasons to take this   Used for:  Chronic constipation        Dose:  1 capful   Take 17 g (1 capful) by mouth daily   Quantity:  510 g   Refills:  11         Stop taking these medicines if you haven't already. Please contact your care team if you have questions.     OMEPRAZOLE PO   Stopped by:  Candy Trujillo MD PhD                Where to get your medicines      These medications were sent to [facility] - [place], [place] - [address]  [address], [facility] [address]     Phone:  [phone]     pantoprazole 40 MG EC tablet         Some of these will need a paper prescription and others can be bought over the counter.  Ask your nurse if you have questions.     Bring a paper prescription for each of these medications     doxepin  3 MG tablet                Primary Care Provider Office Phone # Fax #    Candy Trujillo MD PhD [phone] [address]  [facility] [address]        Equal Access to Services     ASHUTOSH FRANCO : Hadii percy ku lilao Soomaali, waaxda luqadaha, qaybta kaalmada adekendrick, clark hoyos laJaradeulalia topete. So [facility] [phone].    ATENCIÓN: Si habla español, tiene a crandall disposición servicios gratuitos de asistencia lingüística. Llame al [phone].    We comply with applicable federal civil rights laws and [place] laws. We do not discriminate on the basis of race, color, national origin, age, disability, sex, sexual orientation, or gender identity.            Thank you!     Thank you for choosing [facility]  for your care. Our goal is always to provide you with excellent care. Hearing back from our patients is one way we can continue to improve our services. Please take a few minutes to complete the written survey that you may receive in the mail after your visit with us. Thank you!             Your Updated Medication List - Protect others around you: Learn how to safely use, store and throw away your medicines at www.disposemymeds.org.          This list is accurate as of: 1/18/18  3:14 PM.  Always use your most recent med list.                   Brand Name Dispense Instructions for use Diagnosis    * albuterol (2.5 MG/3ML) 0.083% neb solution      Take 1 vial by nebulization every 6 hours as needed for shortness of breath / dyspnea or wheezing        * albuterol 108 (90 BASE) MCG/ACT Inhaler    PROAIR HFA/PROVENTIL HFA/VENTOLIN HFA    3 Inhaler    Inhale 2 puffs into the lungs every 4 hours as needed for shortness of breath / dyspnea or wheezing    Moderate persistent asthma with exacerbation       bimatoprost 0.01 % Soln    LUMIGAN    3 Bottle    Place 1 drop into both eyes At Bedtime    Primary open angle glaucoma of both eyes, unspecified glaucoma stage        brimonidine-timolol 0.2-0.5 % ophthalmic solution    COMBIGAN    10 mL    Place 1 drop into both eyes 2 times daily    Primary open angle glaucoma of both eyes, moderate stage       CALCIUM 600 + D 600-200 MG-UNIT Tabs   Generic drug:  Calcium Carb-Cholecalciferol      Take 1 tablet by mouth daily Vitamin L=8645 iu    Keratoconjunctivitis sicca, not specified as Sjogren's, left, Glaucoma       cycloSPORINE 0.05 % ophthalmic emulsion    RESTASIS    1 Box    Place 1 drop into both eyes every 12 hours    Keratoconjunctivitis sicca, not specified as Sjogren's, left       DILT- MG 24 hr capsule   Generic drug:  diltiazem     90 capsule    TAKE 1 CAPSULE EVERY DAY    Essential hypertension with goal blood pressure less than 140/90       doxepin 10 MG/ML (HIGH CONC) solution    SINEquan    30 mL    Take 0.3 mLs (3 mg) by mouth At Bedtime    Chronic insomnia       doxepin 3 MG tablet    SILENOR    30 tablet    Take 1 tablet (3 mg) by mouth At Bedtime    Chronic insomnia       FLOVENT  MCG/ACT Inhaler   Generic drug:  fluticasone     36 g    INHALE 2 PUFFS INTO THE LUNGS 2 TIMES DAILY    Simple chronic bronchitis (H)       FLUoxetine 20 MG capsule    PROzac    90 capsule    Take 1 capsule (20 mg) by mouth daily    Depression with anxiety       gabapentin 100 MG capsule    NEURONTIN    180 capsule    Take 2 capsules (200 mg) by mouth daily (late afternoon)    Restless leg syndrome       ipratropium - albuterol 0.5 mg/2.5 mg/3 mL 0.5-2.5 (3) MG/3ML    DUONEB     Take 1 vial by nebulization every 6 hours as needed for shortness of breath / dyspnea or wheezing        pantoprazole 40 MG EC tablet    PROTONIX    90 tablet    Take 1 tablet (40 mg) by mouth daily Take 30-60 minutes before a meal.    Peptic ulcer       polyethylene glycol powder    MIRALAX    510 g    Take 17 g (1 capful) by mouth daily    Chronic constipation       pravastatin 40 MG tablet    PRAVACHOL    45 tablet    TAKE 1/2 TABLET EVERY DAY     Hyperlipidemia LDL goal <100       PRESERVISION AREDS 2 PO           Respiratory Therapy Supplies Deja     1 each    Optichamber or equivalent to use with spacer.    Moderate persistent asthma without complication       senna-docusate 8.6-50 MG per tablet    SENOKOT-S;PERICOLACE     Take 1 tablet by mouth daily        SYSTANE 0.4-0.3 % Soln ophthalmic solution   Generic drug:  polyethylene glycol 0.4%- propylene glycol 0.3%      Place 1 drop into both eyes 3 times daily as needed for dry eyes    Keratoconjunctivitis sicca, not specified as Sjogren's, left, Glaucoma       * Notice:  This list has 2 medication(s) that are the same as other medications prescribed for you. Read the directions carefully, and ask your doctor or other care provider to review them with you.

## 2018-01-18 NOTE — NURSING NOTE
Chief Complaint   Patient presents with     Hospital F/U       Initial /64  Pulse 73  Temp 96.6  F (35.9  C) (Temporal)  Wt 114 lb (51.7 kg)  SpO2 96%  BMI 22.26 kg/m2 Estimated body mass index is 22.26 kg/(m^2) as calculated from the following:    Height as of 6/14/17: 5' (1.524 m).    Weight as of this encounter: 114 lb (51.7 kg).  Medication Reconciliation: complete

## 2018-01-19 ENCOUNTER — TELEPHONE (OUTPATIENT)
Dept: OPHTHALMOLOGY | Facility: CLINIC | Age: 83
End: 2018-01-19

## 2018-01-19 RX ORDER — BRIMONIDINE TARTRATE AND TIMOLOL MALEATE 2; 5 MG/ML; MG/ML
1 SOLUTION OPHTHALMIC 2 TIMES DAILY
Qty: 10 ML | Refills: 11 | Status: SHIPPED | OUTPATIENT
Start: 2018-01-19 | End: 2019-05-15

## 2018-01-19 NOTE — TELEPHONE ENCOUNTER
[facility] Call Center    Phone Message    Name of Caller: self    Phone Number: Cell number on file:    Telephone Information:   Mobile [phone]       Best time to return call: soon     May a detailed message be left on voicemail: no    Relation to patient: Self    Reason for Call: Other: Pt is requesting a call back regarding medication bimatoprost (LUMIGAN) 0.01 % SOLN    Action Taken: Message routed to:  Adult Clinics: Eye p [id number]

## 2018-01-22 NOTE — TELEPHONE ENCOUNTER
Pt states that the quantity sent in was not acceptable. Per formulary. [employer] told her they would send a fax to Dr Reyes for update.  Janeth ANTONIO. COA. OSC

## 2018-01-23 ENCOUNTER — NURSE TRIAGE (OUTPATIENT)
Dept: NURSING | Facility: CLINIC | Age: 83
End: 2018-01-23

## 2018-01-23 DIAGNOSIS — J41.0 SIMPLE CHRONIC BRONCHITIS (H): ICD-10-CM

## 2018-01-23 LAB
HGB BLD-MCNC: 11.8 G/DL (ref 11.7–15.7)
SODIUM SERPL-SCNC: 137 MMOL/L (ref 133–144)

## 2018-01-23 PROCEDURE — 85018 HEMOGLOBIN: CPT | Performed by: INTERNAL MEDICINE

## 2018-01-23 PROCEDURE — 84295 ASSAY OF SERUM SODIUM: CPT | Performed by: INTERNAL MEDICINE

## 2018-01-23 NOTE — TELEPHONE ENCOUNTER
Per [facility] there are no issues with the way the orders for either of the glaucoma drops was written. They are being processed and will be shipped to her in the next 2-3 days.    There was nothing on file that the [facility] pharmacy called and spoke to the pt about anything regarding this med for any reason.      did state that it is a new year so there is a higher copay for her at $116 for 3 mo for both of the eye drops.      also gave a clinical services number that she can call if she decides she no longer wants the meds and wants to send them back [phone].    Janeth ANTONIO. COA. OSC

## 2018-01-23 NOTE — TELEPHONE ENCOUNTER
Reason for Call:    FNA assisted to connect Home care Nurse Christina to [facility] and Home triage nurse Almaz to  clarification of home care lab orders from Dr LISA Pierson   .Hortensia Badillo RN [place] nurse advisors.

## 2018-01-23 NOTE — TELEPHONE ENCOUNTER
Sailaja, Homecare RN is at patient's home.  Asking if she can do a lab draw in patient's home for Sodium and hemoglobin.  The order is in chart.   Huddle with Paloma De Anda NP - ok to get the labs done today.  Per patient's daughter in home, Dr. Trujillo just needed one more normal in range sodium and hemoglobin.    Also patient needs FLOVENT INHALER. Sent to [facility].  Patient uses this daily, the associated diagnosis is Simple chronic bronchitis    Routing to  to please send refill if appropriate.    Almaz Mclean RN, [facility]

## 2018-01-24 ENCOUNTER — DOCUMENTATION ONLY (OUTPATIENT)
Dept: CARE COORDINATION | Facility: CLINIC | Age: 83
End: 2018-01-24

## 2018-01-24 RX ORDER — FLUTICASONE PROPIONATE 220 UG/1
2 AEROSOL, METERED RESPIRATORY (INHALATION) 2 TIMES DAILY
Qty: 36 G | Refills: 3 | Status: SHIPPED | OUTPATIENT
Start: 2018-01-24 | End: 2019-04-11

## 2018-01-24 NOTE — PROGRESS NOTES
Dear Nancy,   Here are your recent results which are within the expected range.  Please continue with your current plan of care.     Please call or Mychart to our office if you have further questions.     Candy Trujillo MD-PhD

## 2018-01-24 NOTE — PROGRESS NOTES
[facility] utilizes an encounter to take the place of a direct phone call to your office. Please take a moment to review the below request. Your reply to this encounter will act as a verbal OK of orders if requested below. Thank you.    ORDER    PT eval completed 1/24/18, plan continued PT 2w2 beginning wk of 1/28/18 for gait/transfer training, balance/ther ex.  Joshua Pruitt PT  [phone]

## 2018-01-25 ENCOUNTER — CARE COORDINATION (OUTPATIENT)
Dept: CARE COORDINATION | Facility: CLINIC | Age: 83
End: 2018-01-25

## 2018-01-25 DIAGNOSIS — F51.04 CHRONIC INSOMNIA: ICD-10-CM

## 2018-01-25 NOTE — PROGRESS NOTES
Clinic Care Coordination Contact    Situation: Patient chart reviewed by care coordinator.    Background: Follow up from 1/10/18    Assessment: RN CC reviewed chart. Patient had a blood draw on 1/24/18 to recheck sodium and hgb and this was wnl. Called patient and spoke with daughter Melisa. Per Melisa, patient is doing well. No concerns at this time. Melisa is requesting a refill on Doxepin to [employer]. RN CC will send request to PCP.    Plan/Recommendations: RN CC plans to follow up in about 1 month to reassess for needs at that time. Will continue to follow the patient and remain available as needs arise.    Lorraine Crespo RN  RN Care Coordinator  [facility]  [phone]

## 2018-01-26 RX ORDER — DOXEPIN HYDROCHLORIDE 10 MG/ML
3 SOLUTION ORAL AT BEDTIME
Qty: 27 ML | Refills: 3 | Status: SHIPPED | OUTPATIENT
Start: 2018-01-26 | End: 2018-02-02

## 2018-01-26 NOTE — PROGRESS NOTES
Please confirm with Melisa if they want the liquid or the pill for doxepin. Family is to find out if the pill form is covered.

## 2018-01-26 NOTE — PROGRESS NOTES
Called Melisa on home number to clarify. She verified that tablets are too expensive. They are requesting the liquid version of the doxepin sent to [facility] mail order pharmacy.  Will route to Dr. Candy Trujillo for review and orders.  Levi Aceves, CMA

## 2018-02-02 DIAGNOSIS — F51.04 CHRONIC INSOMNIA: ICD-10-CM

## 2018-02-02 NOTE — TELEPHONE ENCOUNTER
Routing refill request to provider for review/approval because:  Pharmacy note: Doxepin 10MG/ML ORAL SOL with a quantity of #27ML. This medication is packaged as #118ML and cannot be broke. Please clarify the quantity to dispense.        doxepin (SINEQUAN) 10 MG/ML (HIGH CONC) solution    Last Written Prescription Date:  1/26/18  Last Fill Quantity: 27mL,  # refills: 3   Last Office Visit with [facility] or [facility] primary care provider:  1/18/18   Future Office Visit:    Next 5 appointments (look out 90 days)     Feb 22, 2018 10:30 AM CST   [facility]    [address] [place] [id number]   [phone]            Mar 15, 2018 10:10 AM CDT   PHYSICAL with Candy Trujillo MD PhD   [facility]    [id number] [place] [id number]   [phone]            Apr 03, 2018  9:45 AM CDT   Return Visit with Lee Reyes MD, [facility] NURSE ONLY   [facility]    [address] [place] [id number]   [phone]

## 2018-02-03 RX ORDER — DOXEPIN HYDROCHLORIDE 10 MG/ML
3 SOLUTION ORAL AT BEDTIME
Qty: 27 ML | Refills: 3 | Status: SHIPPED | OUTPATIENT
Start: 2018-02-03 | End: 2018-02-05

## 2018-02-05 RX ORDER — DOXEPIN HYDROCHLORIDE 10 MG/ML
3 SOLUTION ORAL AT BEDTIME
Qty: 118 ML | Refills: 0 | Status: SHIPPED | OUTPATIENT
Start: 2018-02-05 | End: 2018-08-27

## 2018-02-07 ENCOUNTER — DOCUMENTATION ONLY (OUTPATIENT)
Dept: CARE COORDINATION | Facility: CLINIC | Age: 83
End: 2018-02-07

## 2018-02-07 NOTE — PATIENT INSTRUCTIONS
[facility] now requests orders and shares plan of care/discharge summaries for some patients through [employer].  Please REPLY TO THIS MESSAGE in order to give authorization for orders when needed.  This is considered a verbal order, you will still receive a faxed copy of orders for signature.  Thank you for your assistance in improving collaboration for our patients.      DISCHARGE SUMMARY  Goals met   Patient status improved depression improved, respritory status improved, nutrition improved.   Discharge instructions given to  Patient   Dicharge instructions given to and  Alcides.    Medication instruction\schedule  Taught to take meds as ordered pt Verbalized understanding.  Diet   Taught to eat at least 2 meals a day, try for 3, eat foods hign in nutritients and calories due to lack of appetite, pt Verbalized understanding.    Activity guidelines   Taught to ambualte with walker, pt Verbalized understanding.  Self-care instructions specify   Taught to use inhaler as ordered, bathe with assistance when aide is discharged, pt Verbalized understanding.  Summary of care\outcomes achieved   Pt discharged from skilled nursing homecare services today as pt has stabalized and goals for nursing have been achieved.  Pt was previously hospitalized on 1/9/18 treated for low sodium levels. Pt also had hospitalization prior at [facility] 12/6/17 for acute resp failure and treated for an UTI. Pt discharged home on 12/12/17. Pt continues to feel weak and fatigue.  Pt also had suicidal attempt which also led her to hospitalization.  Pt has improved since back in the home, pt denies any thoughts of hurting or harming self, states she was just depressed of her current health and not being able to do things like she normally could.  Pt has been educated to notify MD or family, spouse if these feelings arise again, pt verbzlied understanding.  Sodium and hemoglobin labs were drawn a few weeks ago and were  wnl, no more labs have been ordered by MD.  Pt appetite has increased, pt is eating 2 meals daily.  Usually eats breakfast from [employer] kitchen around 11 to 12 and then will eat supper in apartment that herself or family prepares.  Pt likes to sleep in late as she has trouble sleeping some nights and then tries to get sleep in the morning to catch up.  Pt takes liquid Doxepin to aide in sleeping, states it only works some of the time for her, varies night to night.  Pt denies any pain at most all visits.  Respiratory status has improved, pt is no longer on nebulizer daily, only talking now PRN.  Pt has demonstrated correct use of inhaler and is using BID with spacer.  At each visit per daughter request pt has demonstrated correct use of inhaler and reports to be compiant.  Denies any SOB with ambulation down hallways, and has been using excercise bike almost daily for exercise.  Pt ambulates with walker and denies any falls.  Family is very involved with pt and come by a few times per week to assist pt.  Daughter is setting up meds for pt and pt is knowledable of med regemin.  Denies any bowel or bladder issues.  Noitifed pt and  that HHA will discontinue once therapy is done in the home, unless pt would like to private pay.  Pt is not interested in private pay options.  Pt states that she is careful and makes sure that she showers with  present in the home, and  can assist if needed.  Both pt daughters had called while nurse was in the home and daughters were notified that last SNV is today.  Pt states wt to be 114 today.  Pt continue to have some dizziness with standing which reprots has been an issues for about 50 years.  Educated to sit and rest if she feels dizziness when ambulating to prevent falls, states that it never gets to the point where she would have to rest.  Pt was told 50 years ago that she may have a minor meniers problem.    Follow up with  PMD  Physician notified of discharge    Candy Trujillo via EP[employer].     Sailaja Fernando RN  [phone]  [email or web address]

## 2018-02-28 ENCOUNTER — TELEPHONE (OUTPATIENT)
Dept: PHARMACY | Facility: CLINIC | Age: 83
End: 2018-02-28

## 2018-02-28 NOTE — TELEPHONE ENCOUNTER
Called patient to schedule a f/u MTM appt. LM for patient to return call.  Almaz Christopher, Pharm.D, BCACP  Medication Therapy Management Pharmacist

## 2018-03-06 ENCOUNTER — ALLIED HEALTH/NURSE VISIT (OUTPATIENT)
Dept: PHARMACY | Facility: CLINIC | Age: 83
End: 2018-03-06
Payer: COMMERCIAL

## 2018-03-06 DIAGNOSIS — M81.0 SENILE OSTEOPOROSIS: ICD-10-CM

## 2018-03-06 DIAGNOSIS — F41.8 DEPRESSION WITH ANXIETY: ICD-10-CM

## 2018-03-06 DIAGNOSIS — H40.1132 PRIMARY OPEN ANGLE GLAUCOMA OF BOTH EYES, MODERATE STAGE: ICD-10-CM

## 2018-03-06 DIAGNOSIS — I10 ESSENTIAL HYPERTENSION, BENIGN: ICD-10-CM

## 2018-03-06 DIAGNOSIS — E78.5 HYPERLIPIDEMIA LDL GOAL <130: ICD-10-CM

## 2018-03-06 DIAGNOSIS — K59.09 CHRONIC CONSTIPATION: ICD-10-CM

## 2018-03-06 DIAGNOSIS — G25.81 RESTLESS LEG SYNDROME: ICD-10-CM

## 2018-03-06 DIAGNOSIS — J44.9 CHRONIC OBSTRUCTIVE PULMONARY DISEASE, UNSPECIFIED COPD TYPE (H): ICD-10-CM

## 2018-03-06 DIAGNOSIS — G47.00 INSOMNIA, UNSPECIFIED TYPE: Primary | ICD-10-CM

## 2018-03-06 DIAGNOSIS — K21.9 GASTROESOPHAGEAL REFLUX DISEASE WITHOUT ESOPHAGITIS: ICD-10-CM

## 2018-03-06 PROCEDURE — 99605 MTMS BY PHARM NP 15 MIN: CPT | Performed by: PHARMACIST

## 2018-03-06 PROCEDURE — 99607 MTMS BY PHARM ADDL 15 MIN: CPT | Performed by: PHARMACIST

## 2018-03-06 NOTE — PROGRESS NOTES
"SUBJECTIVE/OBJECTIVE:                           Nancy Benz is a 89 year old female called for an initial visit for Medication Therapy Management.  She was referred to me from Candy Trujillo.     Chief Complaint: Medication Review.    Allergies/ADRs: Reviewed in Epic  Tobacco: No tobacco use  Alcohol: 7-9 beverages / week; bottle of beer a day  Caffeine: 1 cups/day of decoffee  Activity: walks the hallway and uses a NuStep machine 4 times a week for 30 minutes  PMH: Reviewed in Epic    Medication Adherence/Access:  Patient has assistance with medication administration: family member (daughter).  Patient takes medications 2 time(s) per day.  Per patient, misses medication 0 times per week.   Medication barriers: None.   The patient fills medications at [facility]: NO, fills medications at [facility].     GERD: Current medications include: Protonix (pantoprazole) 40mg once daily. Pt c/o no current symptoms.  Patient feels that current regimen is effective. Patient states she has \"5 small ulcers\". Patient is not having any issues with her stomach. After reviewing Care Everywhere it appears PPI prophylaxis was started when patient was on prednisone.    Depression:  Current medications include: Fluoxetine 20mg once daily. \"Mood is pretty good for a 89 year old\" patient is active at [facility]  PHQ-9 SCORE 11/28/2016   Total Score 3     RLS: current therapy includes gabapentin 200mg daily in the late afternoon. Patient states this is very helpful. Patient denies side effects.    Hypertension: Current medications include diltiazem 120mg daily.  Patient does not self-monitor BP.  Patient reports no current medication side effects.    Open Angle Glaucoma/Dry Eyes: current therapy includes Lumigan 1 drop both eyes at bedtime, Combigan 1 drop both eyes twice daily, Restasis and Systane. Patient is wondering if there is anything else she could use in place of Systane. Patient uses Systane every couple of " hours.    Asthma/COPD: current therapy includes fluticasone 220mg 2 puffs bid, patient does rinse her mouth after using, albuterol 2 puffs every 4 hours as needed. Patient has not had to use albuterol recently.   Oxygen 2L at night. Patient also has albuterol nebs and duonebs but has not had to use since she was hospitalized in December.    Insomnia: Current medications include: doxepin 3mg. Pt reports trouble falling asleep. Doxepin does help her fall asleep. Patient has noticed that if she does take things like this consistently then she states that her medication stops working.     Constipation: current therapy includes senna-s daily prn patient has IBS-constipation. Patient has not been taking senna daily but was having problems with constipation.     Osteoporosis: Current therapy includes: calcium 600/Vitamin D 1000u. Pt is experiencing side effects.  Pt is getting the following sources of dietary calcium: milk and yogurt  Last vitamin D level: unknown  DEXA History: 2016  Risk factors: post-menopausal  chronic PPI use    Hyperlipidemia: Current therapy includes pravastatin 20mg once daily.  Pt reports no significant myalgias or other side effects.  Patient has been taking for years.  Patient states this is working and she does not want to stop.    Current labs include:  Today's Vitals: There were no vitals taken for this visit.  BP Readings from Last 3 Encounters:   01/18/18 132/64   01/05/18 140/72   10/17/17 126/70     No results found for: A1C.  Lab Results   Component Value Date    CHOL 175 09/11/2017     Lab Results   Component Value Date    TRIG 88 09/11/2017     Lab Results   Component Value Date    HDL 93 09/11/2017     Lab Results   Component Value Date    LDL 64 09/11/2017       Liver Function Studies -   Recent Labs   Lab Test  09/11/17   0849  05/08/17   1531   PROTTOTAL   --   6.7*   ALBUMIN   --   3.2*   BILITOTAL   --   0.4   ALKPHOS   --   51   AST   --   16   ALT  18  28       Lab Results    Component Value Date    UCRR 113 09/11/2017    MICROL 27 09/11/2017    UMALCR 24.16 09/11/2017       Last Basic Metabolic Panel:  Lab Results   Component Value Date     01/23/2018      Lab Results   Component Value Date    POTASSIUM 4.0 01/05/2018     Lab Results   Component Value Date    CHLORIDE 87 01/05/2018     Lab Results   Component Value Date    BUN 11 01/05/2018     Lab Results   Component Value Date    CR 0.49 01/05/2018     GFR Estimate   Date Value Ref Range Status   01/05/2018 >90 >60 mL/min/1.7m2 Final     Comment:     Non  GFR Calc   09/11/2017 71 >60 mL/min/1.7m2 Final     Comment:     Non  GFR Calc   05/08/2017 78 >60 mL/min/1.7m2 Final     Comment:     Non  GFR Calc     GFR Estimate If Black   Date Value Ref Range Status   01/05/2018 >90 >60 mL/min/1.7m2 Final     Comment:      GFR Calc   09/11/2017 86 >60 mL/min/1.7m2 Final     Comment:      GFR Calc   05/08/2017 >90   GFR Calc   >60 mL/min/1.7m2 Final     TSH   Date Value Ref Range Status   11/14/2016 1.17 0.40 - 4.00 mU/L Final   ]    Most Recent Immunizations   Administered Date(s) Administered     Influenza (High Dose) 3 valent vaccine 10/03/2017     Pneumo Conj 13-V (2010&after) 07/05/2016     Pneumococcal 23 valent 10/19/2010     TD (ADULT, 7+) 09/01/2000     Tdap (Adacel,Boostrix) 09/22/2015       ASSESSMENT:                             Current medications were reviewed today.     Medication Adherence: good, no issues identified    GERD: Needs improvement. Could consider discontinuing PPI. Will discuss with PCP.    Depression: stable    RLS: stable    Hypertension: stable    Open Angle Glaucoma/Dry Eyes:  Patient may benefit from a trial of a different artificial tear such as Refresh Optive    Asthma/COPD: stable    Insomnia: patient may benefit from a trial of doxepin every night.     Constipation: patient may benefit from using Senna  daily    Osteoporosis: stable    Hyperlipidemia: stable; due to patient's age may be reasonable to discontinue therapy at this time patient declines.    PLAN:                            Could consider trying Refresh Optive  Recommend using doxepin nightly  Recommend using senna daily  Could consider discontinuing PPI.    I spent 45 minutes with this patient today. All changes were made via collaborative practice agreement with Candy Trujillo. A copy of the visit note was provided to the patient's primary care provider.    Will follow up in after consulting with PCP.    The patient was sent via [employer] a summary of these recommendations as an after visit summary.     Almaz Christopher, Pharm.D, BCACP  Medication Therapy Management Pharmacist

## 2018-03-06 NOTE — PATIENT INSTRUCTIONS
Could consider trying Refresh Optive  Recommend using doxepin nightly  Recommend using senna daily

## 2018-03-06 NOTE — MR AVS SNAPSHOT
After Visit Summary   3/6/2018    Nancy Benz    MRN: [id number]           Patient Information     Date Of Birth          8/5/1922        Visit Information        Provider Department      3/6/2018 11:00 AM Almaz Christopher, [facility] MTM        Today's Diagnoses     Insomnia, unspecified type    -  1    Senile osteoporosis        Restless leg syndrome        Chronic constipation        Hyperlipidemia LDL goal <130        Depression with anxiety        Chronic obstructive pulmonary disease, unspecified COPD type (H)        Essential hypertension, benign        Primary open angle glaucoma of both eyes, moderate stage        Gastroesophageal reflux disease without esophagitis          Care Instructions    Could consider trying Refresh Optive  Recommend using doxepin nightly  Recommend using senna daily            Follow-ups after your visit        Your next 10 appointments already scheduled     Mar 15, 2018  9:00 AM CDT   LAB with LAB FIRST FLOOR [facility] ([facility])    [address] [place] [id number] [phone]           Please do not eat 10-12 hours before your appointment if you are coming in fasting for labs on lipids, cholesterol, or glucose (sugar). This does not apply to pregnant women. Water, hot tea and black coffee (with nothing added) are okay. Do not drink other fluids, diet soda or chew gum.            Mar 15, 2018 10:10 AM CDT   PHYSICAL with Candy Trujillo MD PhD   [facility] ([facility])    [address] [place] [id number] [phone]            Apr 03, 2018  9:45 AM CDT   Return Visit with Lee Reyes MD, [facility] NURSE ONLY   [facility] ([facility])    [address] [place] [id number] [phone]            May 01, 2018  3:00 PM CDT   Office Visit with PFT LAB   [facility]    [address] [place] [id number] [phone]           Bring a current list of meds and any records pertaining to this visit. For Physicals, please bring immunization records and any forms needing to be filled out. Please arrive 10 minutes early to complete paperwork.            May 01, 2018  4:00 PM CDT   New Visit with Flor Fraser MD   [facility]    [id number] [place] [id number] [phone]            Jul 31, 2018  1:00 PM CDT   Return Visit with Sobia Terrazas MD   [facility])    [address] [place] [id number] [phone]              Who to contact     If you have questions or need follow up information about today's clinic visit or your schedule please contact [facility] directly at [phone].  Normal or non-critical lab and imaging results will be communicated to you by [employer], letter or phone within 4 business days after the clinic has received the results. If you do not hear from us within 7 days, please contact the clinic through [employer] or phone. If you have a critical or abnormal lab result, we will notify you by phone as soon as possible.  Submit refill requests through [email or web address] or call your pharmacy and they will forward the refill request to us. Please allow 3 business days for your refill to be completed.          Additional Information About Your Visit        [facility] Information     [email or web address] gives you secure access to your electronic health record. If you see a primary care provider, you can also send messages to your care team and make appointments. If you have questions, please call your primary care clinic.  If you do not have a primary care provider, please call [phone] and they will assist you.        Care EveryWhere ID     This is your Care EveryWhere ID. This  could be used by other organizations to access your [facility] medical records  [id number]         Blood Pressure from Last 3 Encounters:   01/18/18 132/64   01/05/18 140/72   10/17/17 126/70    Weight from Last 3 Encounters:   01/18/18 114 lb (51.7 kg)   01/05/18 118 lb (53.5 kg)   09/11/17 120 lb (54.4 kg)              Today, you had the following     No orders found for display       Primary Care Provider Office Phone # Fax #    Candy Trujillo MD PhD [phone]       [address]  [facility] [address]        Equal Access to Services     [facility] campusDIANA : Hadii percy bowles Sokevin, waaxda luradhaadaha, qaybta kaalmada adeyeyada, clark alfredo . So [facility] [phone].    ATENCIÓN: Si habla español, tiene a crandall disposición servicios gratuitos de asistencia lingüística. Llame al [phone].    We comply with applicable federal civil rights laws and [place] laws. We do not discriminate on the basis of race, color, national origin, age, disability, sex, sexual orientation, or gender identity.            Thank you!     Thank you for choosing [facility]  for your care. Our goal is always to provide you with excellent care. Hearing back from our patients is one way we can continue to improve our services. Please take a few minutes to complete the written survey that you may receive in the mail after your visit with us. Thank you!             Your Updated Medication List - Protect others around you: Learn how to safely use, store and throw away your medicines at www.disposemymeds.org.          This list is accurate as of 3/6/18 12:00 PM.  Always use your most recent med list.                   Brand Name Dispense Instructions for use Diagnosis    * albuterol (2.5 MG/3ML) 0.083% neb solution      Take 1 vial by nebulization every 6 hours as needed for shortness of breath / dyspnea or wheezing        * albuterol 108 (90 BASE) MCG/ACT Inhaler    PROAIR HFA/PROVENTIL  HFA/VENTOLIN HFA    3 Inhaler    Inhale 2 puffs into the lungs every 4 hours as needed for shortness of breath / dyspnea or wheezing    Moderate persistent asthma with exacerbation       bimatoprost 0.01 % Soln    LUMIGAN    3 Bottle    Place 1 drop into both eyes At Bedtime    Primary open angle glaucoma of both eyes, unspecified glaucoma stage       brimonidine-timolol 0.2-0.5 % ophthalmic solution    COMBIGAN    10 mL    Place 1 drop into both eyes 2 times daily    Primary open angle glaucoma of both eyes, moderate stage       CALCIUM 600 + D 600-200 MG-UNIT Tabs   Generic drug:  Calcium Carb-Cholecalciferol      Take 1 tablet by mouth daily Vitamin N=1066 iu    Keratoconjunctivitis sicca, not specified as Sjogren's, left, Glaucoma       cycloSPORINE 0.05 % ophthalmic emulsion    RESTASIS    1 Box    Place 1 drop into both eyes every 12 hours    Keratoconjunctivitis sicca, not specified as Sjogren's, left       DILT- MG 24 hr capsule   Generic drug:  diltiazem     90 capsule    TAKE 1 CAPSULE EVERY DAY    Essential hypertension with goal blood pressure less than 140/90       doxepin 10 MG/ML (HIGH CONC) solution    SINEquan    118 mL    Take 0.3 mLs (3 mg) by mouth At Bedtime    Chronic insomnia       FLUoxetine 20 MG capsule    PROzac    90 capsule    Take 1 capsule (20 mg) by mouth daily    Depression with anxiety       fluticasone 220 MCG/ACT Inhaler    FLOVENT HFA    36 g    Inhale 2 puffs into the lungs 2 times daily    Simple chronic bronchitis (H)       gabapentin 100 MG capsule    NEURONTIN    180 capsule    Take 2 capsules (200 mg) by mouth daily (late afternoon)    Restless leg syndrome       ipratropium - albuterol 0.5 mg/2.5 mg/3 mL 0.5-2.5 (3) MG/3ML    DUONEB     Take 1 vial by nebulization every 6 hours as needed for shortness of breath / dyspnea or wheezing        pantoprazole 40 MG EC tablet    PROTONIX    90 tablet    Take 1 tablet (40 mg) by mouth daily Take 30-60 minutes before a meal.     Peptic ulcer       pravastatin 40 MG tablet    PRAVACHOL    45 tablet    TAKE 1/2 TABLET EVERY DAY    Hyperlipidemia LDL goal <100       Respiratory Therapy Supplies Deja     1 each    Optichamber or equivalent to use with spacer.    Moderate persistent asthma without complication       senna-docusate 8.6-50 MG per tablet    SENOKOT-S;PERICOLACE     Take 1 tablet by mouth daily        SYSTANE 0.4-0.3 % Soln ophthalmic solution   Generic drug:  polyethylene glycol 0.4%- propylene glycol 0.3%      Place 1 drop into both eyes 3 times daily as needed for dry eyes    Keratoconjunctivitis sicca, not specified as Sjogren's, left, Glaucoma       * Notice:  This list has 2 medication(s) that are the same as other medications prescribed for you. Read the directions carefully, and ask your doctor or other care provider to review them with you.

## 2018-03-07 ASSESSMENT — PATIENT HEALTH QUESTIONNAIRE - PHQ9: SUM OF ALL RESPONSES TO PHQ QUESTIONS 1-9: 4

## 2018-03-13 ENCOUNTER — DOCUMENTATION ONLY (OUTPATIENT)
Dept: LAB | Facility: CLINIC | Age: 83
End: 2018-03-13

## 2018-03-13 DIAGNOSIS — E78.5 HYPERLIPIDEMIA LDL GOAL <130: Primary | ICD-10-CM

## 2018-03-13 NOTE — PROGRESS NOTES
Appointment note states Fasting Labs. Please order Fasting Labs if needed.  Thank you  Astrid UNGERT

## 2018-03-15 ENCOUNTER — OFFICE VISIT (OUTPATIENT)
Dept: PEDIATRICS | Facility: CLINIC | Age: 83
End: 2018-03-15
Payer: COMMERCIAL

## 2018-03-15 VITALS
HEIGHT: 58 IN | DIASTOLIC BLOOD PRESSURE: 64 MMHG | OXYGEN SATURATION: 95 % | HEART RATE: 65 BPM | TEMPERATURE: 96.9 F | WEIGHT: 116 LBS | BODY MASS INDEX: 24.35 KG/M2 | SYSTOLIC BLOOD PRESSURE: 110 MMHG

## 2018-03-15 DIAGNOSIS — Z00.00 MEDICARE ANNUAL WELLNESS VISIT, SUBSEQUENT: Primary | ICD-10-CM

## 2018-03-15 DIAGNOSIS — F32.5 MAJOR DEPRESSION IN COMPLETE REMISSION (H): ICD-10-CM

## 2018-03-15 DIAGNOSIS — E87.1 HYPONATREMIA: Primary | ICD-10-CM

## 2018-03-15 DIAGNOSIS — E87.1 HYPONATREMIA: ICD-10-CM

## 2018-03-15 DIAGNOSIS — E78.5 HYPERLIPIDEMIA LDL GOAL <130: ICD-10-CM

## 2018-03-15 DIAGNOSIS — D62 ANEMIA DUE TO BLOOD LOSS, ACUTE: ICD-10-CM

## 2018-03-15 DIAGNOSIS — J45.30 MILD PERSISTENT ASTHMA WITHOUT COMPLICATION: ICD-10-CM

## 2018-03-15 LAB
ANION GAP SERPL CALCULATED.3IONS-SCNC: 7 MMOL/L (ref 3–14)
BUN SERPL-MCNC: 19 MG/DL (ref 7–30)
CALCIUM SERPL-MCNC: 8.6 MG/DL (ref 8.5–10.1)
CHLORIDE SERPL-SCNC: 101 MMOL/L (ref 94–109)
CO2 SERPL-SCNC: 31 MMOL/L (ref 20–32)
CREAT SERPL-MCNC: 0.58 MG/DL (ref 0.52–1.04)
ERYTHROCYTE [DISTWIDTH] IN BLOOD BY AUTOMATED COUNT: 14.3 % (ref 10–15)
GFR SERPL CREATININE-BSD FRML MDRD: >90 ML/MIN/1.7M2
GLUCOSE SERPL-MCNC: 147 MG/DL (ref 70–99)
HCT VFR BLD AUTO: 39.4 % (ref 35–47)
HGB BLD-MCNC: 12.2 G/DL (ref 11.7–15.7)
MCH RBC QN AUTO: 31 PG (ref 26.5–33)
MCHC RBC AUTO-ENTMCNC: 31 G/DL (ref 31.5–36.5)
MCV RBC AUTO: 100 FL (ref 78–100)
PLATELET # BLD AUTO: 238 10E9/L (ref 150–450)
POTASSIUM SERPL-SCNC: 4 MMOL/L (ref 3.4–5.3)
RBC # BLD AUTO: 3.94 10E12/L (ref 3.8–5.2)
SODIUM SERPL-SCNC: 139 MMOL/L (ref 133–144)
WBC # BLD AUTO: 6.8 10E9/L (ref 4–11)

## 2018-03-15 PROCEDURE — G0439 PPPS, SUBSEQ VISIT: HCPCS | Performed by: INTERNAL MEDICINE

## 2018-03-15 PROCEDURE — 36415 COLL VENOUS BLD VENIPUNCTURE: CPT | Performed by: INTERNAL MEDICINE

## 2018-03-15 PROCEDURE — 80048 BASIC METABOLIC PNL TOTAL CA: CPT | Performed by: INTERNAL MEDICINE

## 2018-03-15 PROCEDURE — 85027 COMPLETE CBC AUTOMATED: CPT | Performed by: INTERNAL MEDICINE

## 2018-03-15 NOTE — MR AVS SNAPSHOT
After Visit Summary   3/15/2018    Nancy Benz    MRN: [id number]           Patient Information     Date Of Birth          8/5/1922        Visit Information        Provider Department      3/15/2018 10:10 AM Candy Trujillo MD PhD [facility]        Care Instructions    Make appointment(s) for:   -- chronic disease review in 6 months with fasting lab.                 Preventive Health Recommendations    Female Ages 65 +    Yearly exam:     See your health care provider every year in order to  o Review health changes.   o Discuss preventive care.    o Review your medicines if your doctor has prescribed any.      You no longer need a yearly Pap test unless you've had an abnormal Pap test in the past 10 years. If you have vaginal symptoms, such as bleeding or discharge, be sure to talk with your provider about a Pap test.      Every 1 to 2 years, have a mammogram.  If you are over 69, talk with your health care provider about whether or not you want to continue having screening mammograms.      Every 10 years, have a colonoscopy. Or, have a yearly FIT test (stool test). These exams will check for colon cancer.       Have a cholesterol test every 5 years, or more often if your doctor advises it.       Have a diabetes test (fasting glucose) every three years. If you are at risk for diabetes, you should have this test more often.       At age 65, have a bone density scan (DEXA) to check for osteoporosis (brittle bone disease).    Shots:    Get a flu shot each year.    Get a tetanus shot every 10 years.    Talk to your doctor about your pneumonia vaccines. There are now two you should receive - Pneumovax (PPSV 23) and Prevnar (PCV 13).    Talk to your doctor about the shingles vaccine.    Talk to your doctor about the hepatitis B vaccine.    Nutrition:     Eat at least 5 servings of fruits and vegetables each day.      Eat whole-grain bread, whole-wheat pasta and brown rice instead of white  grains and rice.      Talk to your provider about Calcium and Vitamin D.     Lifestyle    Exercise at least 150 minutes a week (30 minutes a day, 5 days a week). This will help you control your weight and prevent disease.      Limit alcohol to one drink per day.      No smoking.       Wear sunscreen to prevent skin cancer.       See your dentist twice a year for an exam and cleaning.      See your eye doctor every 1 to 2 years to screen for conditions such as glaucoma, macular degeneration and cataracts.    At your visit today, we discussed your risk for falls and preventive options.    Fall Prevention  Falls often occur due to slipping, tripping or losing your balance. Millions of people fall every year and injure themselves. Here are ways to reduce your risk of falling again.    Think about your fall, was there anything that caused your fall that can be fixed, removed, or replaced?    Make your home safe by keeping walkways clear of objects you may trip over.    Use non-slip pads under rugs. Do not use area rugs or small throw rugs.    Use non-slip mats in bathtubs and showers.    Install handrails and lights on staircases.    Do not walk in poorly lit areas.    Do not stand on chairs or wobbly ladders.    Use caution when reaching overhead or looking upward. This position can cause a loss of balance.    Be sure your shoes fit properly, have non-slip bottoms and are in good condition.     Wear shoes both inside and out. Avoid going barefoot or wearing slippers.    Be cautious when going up and down stairs, curbs, and when walking on uneven sidewalks.    If your balance is poor, consider using a cane or walker.    If your fall was related to alcohol use, stop or limit alcohol intake.     If your fall was related to use of sleeping medicines, talk to your doctor about this. You may need to reduce your dosage at bedtime if you awaken during the night to go to the bathroom.      To reduce the need for nighttime  bathroom trips:    Avoid drinking fluids for several hours before going to bed    Empty your bladder before going to bed    Men can keep a urinal at the bedside    Stay as active as you can. Balance, flexibility, strength, and endurance all come from exercise. They all play a role in preventing falls. Ask your healthcare provider which types of activity are right for you.    Get your vision checked on a regular basis.    If you have pets, know where they are before you stand up or walk so you don't trip over them.    Use night lights.  Date Last Reviewed: 11/5/2015 2000-2017 [employer]. [address] [place] [address]. All rights reserved. This information is not intended as a substitute for professional medical care. Always follow your healthcare professional's instructions.                  Follow-ups after your visit        Your next 10 appointments already scheduled     Apr 03, 2018  9:45 AM CDT   Return Visit with Lee Reyes MD, [facility] NURSE ONLY   [facility])    [id number] [place] [id number]   [phone]            May 01, 2018  3:00 PM CDT   Office Visit with PFT LAB   [facility])    [address] [place] [id number]   [phone]           Bring a current list of meds and any records pertaining to this visit. For Physicals, please bring immunization records and any forms needing to be filled out. Please arrive 10 minutes early to complete paperwork.            May 01, 2018  4:00 PM CDT   New Visit with Flor Fraser MD   [facility])    [address] [place] [address]   [phone]            Jul 31, 2018  1:00 PM CDT   Return Visit with Sobia Terrazas MD   [facility])    [address] [place] [address]  "  [phone]              Who to contact     If you have questions or need follow up information about today's clinic visit or your schedule please contact [facility] directly at [phone].  Normal or non-critical lab and imaging results will be communicated to you by MyChart, letter or phone within 4 business days after the clinic has received the results. If you do not hear from us within 7 days, please contact the clinic through MyChart or phone. If you have a critical or abnormal lab result, we will notify you by phone as soon as possible.  Submit refill requests through [employer] or call your pharmacy and they will forward the refill request to us. Please allow 3 business days for your refill to be completed.          Additional Information About Your Visit        Layar Information     [employer]ar gives you secure access to your electronic health record. If you see a primary care provider, you can also send messages to your care team and make appointments. If you have questions, please call your primary care clinic.  If you do not have a primary care provider, please call [phone] and they will assist you.      L[employer]ar is an electronic gateway that provides easy, online access to your medical records. With [employer], you can request a clinic appointment, read your test results, renew a prescription or communicate with your care team.     To access your existing account, please contact your [facility] or call [phone] for assistance.        Care EveryWhere ID     This is your Care EveryWhere ID. This could be used by other organizations to access your [place] medical records  [id number]        Your Vitals Were     Pulse Temperature Height Pulse Oximetry BMI (Body Mass Index)       65 96.9  F (36.1  C) (Temporal) 4' 9.75\" (1.467 m) 95% 24.45 kg/m2        Blood Pressure from Last 3 Encounters:   03/15/18 110/64   01/18/18 132/64   01/05/18 140/72    Weight " from Last 3 Encounters:   03/15/18 116 lb (52.6 kg)   01/18/18 114 lb (51.7 kg)   01/05/18 118 lb (53.5 kg)              Today, you had the following     No orders found for display       Primary Care Provider Office Phone # Fax #    Candy Trujillo MD PhD [phone] [address]  [facility] [address]        Equal Access to Services     CUATE [facility] : Hadii aad ku hadasho Soomaali, waaxda luqadaha, qaybta kaalmada adeegyada, waxay idiin hayaan adeeg kharash laari . So [facility] [phone].    ATENCIÓN: Si habla español, tiene a crandall disposición servicios gratuitos de asistencia lingüística. Llame al [phone].    We comply with applicable federal civil rights laws and [place] laws. We do not discriminate on the basis of race, color, national origin, age, disability, sex, sexual orientation, or gender identity.            Thank you!     Thank you for choosing [facility]  for your care. Our goal is always to provide you with excellent care. Hearing back from our patients is one way we can continue to improve our services. Please take a few minutes to complete the written survey that you may receive in the mail after your visit with us. Thank you!             Your Updated Medication List - Protect others around you: Learn how to safely use, store and throw away your medicines at www.disposemymeds.org.          This list is accurate as of 3/15/18 10:35 AM.  Always use your most recent med list.                   Brand Name Dispense Instructions for use Diagnosis    * albuterol (2.5 MG/3ML) 0.083% neb solution      Take 1 vial by nebulization every 6 hours as needed for shortness of breath / dyspnea or wheezing        * albuterol 108 (90 BASE) MCG/ACT Inhaler    PROAIR HFA/PROVENTIL HFA/VENTOLIN HFA    3 Inhaler    Inhale 2 puffs into the lungs every 4 hours as needed for shortness of breath / dyspnea or wheezing    Moderate persistent asthma with exacerbation       bimatoprost 0.01  % Soln    LUMIGAN    3 Bottle    Place 1 drop into both eyes At Bedtime    Primary open angle glaucoma of both eyes, unspecified glaucoma stage       brimonidine-timolol 0.2-0.5 % ophthalmic solution    COMBIGAN    10 mL    Place 1 drop into both eyes 2 times daily    Primary open angle glaucoma of both eyes, moderate stage       CALCIUM 600 + D 600-200 MG-UNIT Tabs   Generic drug:  Calcium Carb-Cholecalciferol      Take 1 tablet by mouth daily Vitamin W=1116 iu    Keratoconjunctivitis sicca, not specified as Sjogren's, left, Glaucoma       cycloSPORINE 0.05 % ophthalmic emulsion    RESTASIS    1 Box    Place 1 drop into both eyes every 12 hours    Keratoconjunctivitis sicca, not specified as Sjogren's, left       DILT- MG 24 hr capsule   Generic drug:  diltiazem     90 capsule    TAKE 1 CAPSULE EVERY DAY    Essential hypertension with goal blood pressure less than 140/90       doxepin 10 MG/ML (HIGH CONC) solution    SINEquan    118 mL    Take 0.3 mLs (3 mg) by mouth At Bedtime    Chronic insomnia       FLUoxetine 20 MG capsule    PROzac    90 capsule    Take 1 capsule (20 mg) by mouth daily    Depression with anxiety       fluticasone 220 MCG/ACT Inhaler    FLOVENT HFA    36 g    Inhale 2 puffs into the lungs 2 times daily    Simple chronic bronchitis (H)       gabapentin 100 MG capsule    NEURONTIN    180 capsule    Take 2 capsules (200 mg) by mouth daily (late afternoon)    Restless leg syndrome       ipratropium - albuterol 0.5 mg/2.5 mg/3 mL 0.5-2.5 (3) MG/3ML    DUONEB     Take 1 vial by nebulization every 6 hours as needed for shortness of breath / dyspnea or wheezing        pantoprazole 40 MG EC tablet    PROTONIX    90 tablet    Take 1 tablet (40 mg) by mouth daily Take 30-60 minutes before a meal.    Peptic ulcer       pravastatin 40 MG tablet    PRAVACHOL    45 tablet    TAKE 1/2 TABLET EVERY DAY    Hyperlipidemia LDL goal <100       Respiratory Therapy Supplies Deja     1 each    Optichamber or  equivalent to use with spacer.    Moderate persistent asthma without complication       senna-docusate 8.6-50 MG per tablet    SENOKOT-S;PERICOLACE     Take 1 tablet by mouth daily        SYSTANE 0.4-0.3 % Soln ophthalmic solution   Generic drug:  polyethylene glycol 0.4%- propylene glycol 0.3%      Place 1 drop into both eyes 3 times daily as needed for dry eyes    Keratoconjunctivitis sicca, not specified as Sjogren's, left, Glaucoma       * Notice:  This list has 2 medication(s) that are the same as other medications prescribed for you. Read the directions carefully, and ask your doctor or other care provider to review them with you.

## 2018-03-15 NOTE — NURSING NOTE
"Chief Complaint   Patient presents with     Wellness Visit     Weening off protonix       Initial /64  Pulse 65  Temp 96.9  F (36.1  C) (Temporal)  Ht 4' 9.75\" (1.467 m)  Wt 116 lb (52.6 kg)  SpO2 95%  BMI 24.45 kg/m2 Estimated body mass index is 24.45 kg/(m^2) as calculated from the following:    Height as of this encounter: 4' 9.75\" (1.467 m).    Weight as of this encounter: 116 lb (52.6 kg).  Medication Reconciliation: complete    "

## 2018-03-15 NOTE — PATIENT INSTRUCTIONS
Make appointment(s) for:   -- chronic disease review in 6 months with fasting lab.                 Preventive Health Recommendations    Female Ages 65 +    Yearly exam:     See your health care provider every year in order to  o Review health changes.   o Discuss preventive care.    o Review your medicines if your doctor has prescribed any.      You no longer need a yearly Pap test unless you've had an abnormal Pap test in the past 10 years. If you have vaginal symptoms, such as bleeding or discharge, be sure to talk with your provider about a Pap test.      Every 1 to 2 years, have a mammogram.  If you are over 69, talk with your health care provider about whether or not you want to continue having screening mammograms.      Every 10 years, have a colonoscopy. Or, have a yearly FIT test (stool test). These exams will check for colon cancer.       Have a cholesterol test every 5 years, or more often if your doctor advises it.       Have a diabetes test (fasting glucose) every three years. If you are at risk for diabetes, you should have this test more often.       At age 65, have a bone density scan (DEXA) to check for osteoporosis (brittle bone disease).    Shots:    Get a flu shot each year.    Get a tetanus shot every 10 years.    Talk to your doctor about your pneumonia vaccines. There are now two you should receive - Pneumovax (PPSV 23) and Prevnar (PCV 13).    Talk to your doctor about the shingles vaccine.    Talk to your doctor about the hepatitis B vaccine.    Nutrition:     Eat at least 5 servings of fruits and vegetables each day.      Eat whole-grain bread, whole-wheat pasta and brown rice instead of white grains and rice.      Talk to your provider about Calcium and Vitamin D.     Lifestyle    Exercise at least 150 minutes a week (30 minutes a day, 5 days a week). This will help you control your weight and prevent disease.      Limit alcohol to one drink per day.      No smoking.       Wear sunscreen to  prevent skin cancer.       See your dentist twice a year for an exam and cleaning.      See your eye doctor every 1 to 2 years to screen for conditions such as glaucoma, macular degeneration and cataracts.    At your visit today, we discussed your risk for falls and preventive options.    Fall Prevention  Falls often occur due to slipping, tripping or losing your balance. Millions of people fall every year and injure themselves. Here are ways to reduce your risk of falling again.    Think about your fall, was there anything that caused your fall that can be fixed, removed, or replaced?    Make your home safe by keeping walkways clear of objects you may trip over.    Use non-slip pads under rugs. Do not use area rugs or small throw rugs.    Use non-slip mats in bathtubs and showers.    Install handrails and lights on staircases.    Do not walk in poorly lit areas.    Do not stand on chairs or wobbly ladders.    Use caution when reaching overhead or looking upward. This position can cause a loss of balance.    Be sure your shoes fit properly, have non-slip bottoms and are in good condition.     Wear shoes both inside and out. Avoid going barefoot or wearing slippers.    Be cautious when going up and down stairs, curbs, and when walking on uneven sidewalks.    If your balance is poor, consider using a cane or walker.    If your fall was related to alcohol use, stop or limit alcohol intake.     If your fall was related to use of sleeping medicines, talk to your doctor about this. You may need to reduce your dosage at bedtime if you awaken during the night to go to the bathroom.      To reduce the need for nighttime bathroom trips:    Avoid drinking fluids for several hours before going to bed    Empty your bladder before going to bed    Men can keep a urinal at the bedside    Stay as active as you can. Balance, flexibility, strength, and endurance all come from exercise. They all play a role in preventing falls. Ask  your healthcare provider which types of activity are right for you.    Get your vision checked on a regular basis.    If you have pets, know where they are before you stand up or walk so you don't trip over them.    Use night lights.  Date Last Reviewed: 11/5/2015 2000-2017 [employer]. [address] [place] [address], [place] [address]. All rights reserved. This information is not intended as a substitute for professional medical care. Always follow your healthcare professional's instructions.

## 2018-03-15 NOTE — PROGRESS NOTES
SUBJECTIVE:   Nancy Benz is a 89 year old female who presents for Preventive Visit.      Are you in the first 12 months of your Medicare Part B coverage?  No    Healthy Habits:    Do you get at least three servings of calcium containing foods daily (dairy, green leafy vegetables, etc.)? yes    Amount of exercise or daily activities, outside of work: 5-6 day(s) per week    Problems taking medications regularly No    Medication side effects: No    Have you had an eye exam in the past two years? yes    Do you see a dentist twice per year? yes    Do you have sleep apnea, excessive snoring or daytime drowsiness?no      Ability to successfully perform activities of daily living: No, needs assistance with: transportation, shopping, preparing meals, housework, laundry, medications and managing money    Home safety:  none identified     Hearing impairment: Yes, wears hearing aids    Fall risk:  Fallen 2 or more times in the past year?: No  Any fall with injury in the past year?: No  Timed Up and Go Test (>13.5 is fall risk; contact physician) : 18        COGNITIVE SCREEN  1) Repeat 3 items (Banana, Sunrise, Chair)    2) Clock draw: NORMAL  3) 3 item recall: Recalls 2 objects   Results: NORMAL clock, 1-2 items recalled: COGNITIVE IMPAIRMENT LESS LIKELY    Mini-CogTM Copyright S Cl. Licensed by the author for use in [facility]; reprinted with permission ([email or web address]). All rights reserved.            PROBLEMS TO ADD ON...  Weening off Omeprazole    Reviewed and updated as needed this visit by clinical staff  Tobacco  Allergies  Meds  Med Hx  Surg Hx  Fam Hx  Soc Hx        Reviewed and updated as needed this visit by Provider        Social History   Substance Use Topics     Smoking status: Never Smoker     Smokeless tobacco: Never Used     Alcohol use Yes       If you drink alcohol do you typically have >3 drinks per day or >7 drinks per week? No                        Today's PHQ-2 Score:   PHQ-2  "( 1999 Pf[employer]) 11/14/2016   Q1: Little interest or pleasure in doing things 0   Q2: Feeling down, depressed or hopeless 0   PHQ-2 Score 0       Do you feel safe in your environment - Yes    Do you have a Health Care Directive?: Yes: Advance Directive has been received and scanned.    Current providers sharing in care for this patient include:   Patient Care Team:  Candy Trujillo MD PhD as PCP - General (Internal Medicine)  Flor Parker, RN as Nurse Coordinator  Lorraine Crespo RN as Nurse Coordinator (Family Practice)    The following health maintenance items are reviewed in Epic and correct as of today:  Health Maintenance   Topic Date Due     COPD ACTION PLAN Q1 YR  09/05/1922     ASTHMA CONTROL TEST Q6 MOS  08/05/1927     DEPRESSION ACTION PLAN Q1 YR  08/05/1940     EYE EXAM Q1 YEAR  04/03/2018     ASTHMA ACTION PLAN Q1 YR  06/06/2018     FALL RISK ASSESSMENT  06/14/2018     INFLUENZA VACCINE (SYSTEM ASSIGNED)  09/01/2018     PHQ-9 Q6 MONTHS  09/06/2018     ALT Q1 YR  09/11/2018     LIPID MONITORING Q1 YEAR  09/11/2018     MICROALBUMIN Q1 YEAR  09/11/2018     BMP Q1 YR  01/05/2019     ADVANCE DIRECTIVE PLANNING Q5 YRS  12/18/2022     TETANUS IMMUNIZATION (SYSTEM ASSIGNED)  09/22/2025     SPIROMETRY ONETIME  Completed     PNEUMOCOCCAL  Completed     Labs reviewed in EPIC        ROS:  Constitutional, HEENT, cardiovascular, pulmonary, gi and gu systems are negative, except as otherwise noted.    OBJECTIVE:   /64  Pulse 65  Temp 96.9  F (36.1  C) (Temporal)  Ht 4' 9.75\" (1.467 m)  Wt 116 lb (52.6 kg)  SpO2 95%  BMI 24.45 kg/m2 Estimated body mass index is 24.45 kg/(m^2) as calculated from the following:    Height as of this encounter: 4' 9.75\" (1.467 m).    Weight as of this encounter: 116 lb (52.6 kg).  EXAM:   GENERAL: healthy, alert and no distress  EYES: Eyes grossly normal to inspection, PERRL and conjunctivae and sclerae normal  HENT: ear canals and TM's normal, nose and mouth without ulcers or " lesions  NECK: no adenopathy, no asymmetry, masses, or scars and thyroid normal to palpation  RESP: lungs clear to auscultation - no rales, rhonchi or wheezes  BREAST: normal without masses, tenderness or nipple discharge and no palpable axillary masses or adenopathy  CV: regular rate and rhythm, normal S1 S2, no S3 or S4, no murmur, click or rub, no peripheral edema and peripheral pulses strong  ABDOMEN: soft, nontender, no hepatosplenomegaly, no masses and bowel sounds normal  MS: no gross musculoskeletal defects noted, no edema  SKIN: no suspicious lesions or rashes  NEURO: Normal strength and tone, mentation intact and speech normal  PSYCH: mentation appears normal, affect normal/bright    PHQ-9 SCORE 11/28/2016 3/6/2018   Total Score 3 4     No flowsheet data found.    Results for orders placed or performed in visit on 03/15/18   Basic metabolic panel   Result Value Ref Range    Sodium 139 133 - 144 mmol/L    Potassium 4.0 3.4 - 5.3 mmol/L    Chloride 101 94 - 109 mmol/L    Carbon Dioxide 31 20 - 32 mmol/L    Anion Gap 7 3 - 14 mmol/L    Glucose 147 (H) 70 - 99 mg/dL    Urea Nitrogen 19 7 - 30 mg/dL    Creatinine 0.58 0.52 - 1.04 mg/dL    GFR Estimate >90 >60 mL/min/1.7m2    GFR Estimate If Black >90 >60 mL/min/1.7m2    Calcium 8.6 8.5 - 10.1 mg/dL   CBC with platelets   Result Value Ref Range    WBC 6.8 4.0 - 11.0 10e9/L    RBC Count 3.94 3.8 - 5.2 10e12/L    Hemoglobin 12.2 11.7 - 15.7 g/dL    Hematocrit 39.4 35.0 - 47.0 %     78 - 100 fl    MCH 31.0 26.5 - 33.0 pg    MCHC 31.0 (L) 31.5 - 36.5 g/dL    RDW 14.3 10.0 - 15.0 %    Platelet Count 238 150 - 450 10e9/L         ASSESSMENT / PLAN:       ICD-10-CM    1. Medicare annual wellness visit, subsequent Z00.00    2. 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 Mild persistent asthma without complication J45.30      -- doing well. Previous hyponatremia and anemia resolved.   -- Other chronic health issues stable. Medications refilled.   --  "return in 6 months for chronic disease review with fasting labs.   -- we didn't end up addressing her use of Pantoprazole. Ok to continue for now given her prior history of gastric ulcer.     End of Life Planning:  Patient currently has an advanced directive: DNR/DNI.  Practitioner is supportive of decision.    COUNSELING:  Reviewed preventive health counseling, as reflected in patient instructions        Estimated body mass index is 24.45 kg/(m^2) as calculated from the following:    Height as of this encounter: 4' 9.75\" (1.467 m).    Weight as of this encounter: 116 lb (52.6 kg).       reports that she has never smoked. She has never used smokeless tobacco.      Appropriate preventive services were discussed with this patient, including applicable screening as appropriate for cardiovascular disease, diabetes, osteopenia/osteoporosis, and glaucoma.  As appropriate for age/gender, discussed screening for colorectal cancer, prostate cancer, breast cancer, and cervical cancer. Checklist reviewing preventive services available has been given to the patient.    Reviewed patients plan of care and provided an AVS. The Basic Care Plan (routine screening as documented in Health Maintenance) for Nancy meets the Care Plan requirement. This Care Plan has been established and reviewed with the Patient and daughter Melisa.    Counseling Resources:  ATP IV Guidelines  Pooled Cohorts Equation Calculator  Breast Cancer Risk Calculator  FRAX Risk Assessment  ICSI Preventive Guidelines  Dietary Guidelines for Americans, 2010  USDA's MyPlate  ASA Prophylaxis  Lung CA Screening    Candy Trujillo MD PhD  [facility]  "

## 2018-03-16 ASSESSMENT — PATIENT HEALTH QUESTIONNAIRE - PHQ9: SUM OF ALL RESPONSES TO PHQ QUESTIONS 1-9: 4

## 2018-03-21 ENCOUNTER — PATIENT OUTREACH (OUTPATIENT)
Dept: CARE COORDINATION | Facility: CLINIC | Age: 83
End: 2018-03-21

## 2018-03-21 NOTE — PROGRESS NOTES
Clinic Care Coordination Contact    Situation: Patient chart reviewed by care coordinator.    Background: RN CC follow up.     Assessment: Per chart review, patient was last seen by PCP on 3/15/18 for her annual wellness visit. Dr. Trujillo noted the patient is currently doing well. Chronic health issues are stable and patient is to rtc in 6 months with fasting labs.      Plan/Recommendations: No RN CC needs identified at this time. RN CC will discontinue outreach but remain available if needed in the future.    Lorraine Crespo RN  RN Care Coordinator  [facility]  [phone]

## 2018-03-23 ENCOUNTER — MYC MEDICAL ADVICE (OUTPATIENT)
Dept: PEDIATRICS | Facility: CLINIC | Age: 83
End: 2018-03-23

## 2018-03-23 DIAGNOSIS — K27.9 PEPTIC ULCER: ICD-10-CM

## 2018-03-26 NOTE — TELEPHONE ENCOUNTER
"Dr. Ronnie Ward is out of the clinic until April 4th.  I have forwarded her this message and also to her covering providers.    1.  I think we will have to wait for her response to the cancellation of COPD appt.      2.  I cancelled the lab appt for your dad on 5/10.  He just had his labs done on 3/15/18.  That appointment is not needed as he is not due for any labs.    3.  Also in the office visit on 3/15/18 she wrote \"we didn't end up addressing her use of Pantoprazole. Ok to continue for now given her prior history of gastric ulcer.\"  We can wait for her response to this also when she returns.  "

## 2018-03-26 NOTE — TELEPHONE ENCOUNTER
"Hi, Dr. Trujillo,     Mom and Dad felt very positive about their recent appointment with you. Right now, all is well! Augusto moulton.     I have a couple of questions about upcoming appointments.      1.  First, Mom as scheduled to see Dr. Flor Fraser on May 1 (with a lab immediately preceeding that appt). We think this is the COPD appointment. Mom would prefer not to keep that appointment. She is feeling fine now, and (I am adding this) any changes to her medication regime causes her problems these days. What are your thoughts about her cancelling it?     2.  Second, Dad is scheduled for a lab appointment on May 10, and we don't know why. He wonders if he can \"roll\" that lab appointment into the labs he will do on September 19 when Mom and Dad both come back to see you again.      3.  And third, Mom meant to bring this up at the last appointment, but we need to review the instructions for her going off the pantrapozole (not sure about the spelling - it's the stomach acid medication). I know she shouldn't stop it suddenly; the initial instructions were to cut back to one pill every other day for a week. Then, if there were no stomach problems after that, she should cut back to one pill every third day for a week. But we don't know whatr to do after that. Just stop altogether? Continue easing back on the doseage? Let us know.     That's it for now. Thanks.     Kady Bellamy         1.  Will save for  to address when she returns.  2.  Just had labs completed 3/15/18.  Can cancel lab appt on 5/10/18.  3.  Per chart review from office visit on 3/15/18:  -- we didn't end up addressing her use of Pantoprazole. Ok to continue for now given her prior history of gastric ulcer.    Will wait for Dr. Trujillo to address when she returns.    "

## 2018-03-27 NOTE — TELEPHONE ENCOUNTER
"Routing [employer] message to Dr. Trujillo  to please review when able.      Almaz Mclean RN, [facility]        Thank you for letting me know that Dr. Trujillo is out until April 4.     To further update you, we have decided to keep the May 1 appt with the COPD doctor.  Apparently the PFT lab is \"going away,\" so if we needed to see this doctor in the future, we would have to go to another clinic that had a PFT lab. It seemed easier to just keep the appt. Plus, I suppose that waiting until you are \"having trouble\" to see her wouldn't work well since it is difficult to get an appt. So, we'll keep it.     Mom has already started to wean herself off the Pantaprozole, as instructed by a nurse/specialist from the pharmacy who talks to Mom periodically about her medications. Maybe we will have Mom cut back to a Pantaprozole every other day for a week and then every third day for a week, as originally instructed - and by then Dr. Trujillo will be back and she can instruct us about to go forward. But if Mom has stomach pains, we will go back to the regular dosage.      Thanks.     Kady Bellamy   "

## 2018-04-05 ENCOUNTER — MYC MEDICAL ADVICE (OUTPATIENT)
Dept: PEDIATRICS | Facility: CLINIC | Age: 83
End: 2018-04-05

## 2018-04-05 RX ORDER — PANTOPRAZOLE SODIUM 20 MG/1
TABLET, DELAYED RELEASE ORAL
Qty: 90 TABLET | Refills: 3 | Status: SHIPPED | OUTPATIENT
Start: 2018-04-05 | End: 2018-04-18

## 2018-04-16 ENCOUNTER — TELEPHONE (OUTPATIENT)
Dept: PEDIATRICS | Facility: CLINIC | Age: 83
End: 2018-04-16

## 2018-04-16 NOTE — TELEPHONE ENCOUNTER
[facility] Call Center    Phone Message    May a detailed message be left on voicemail: yes    Reason for Call: Other: Dr Faust sent a new rx to [facility] (pantoprazole) for 20 mg and [facility] is having a problem with it- it took [facility] 2 weeks to inform patient.  joanne now has a fax request in to dr faust- please return fax to [phone].  a recorded msg was left on the msg machine.     Action Taken: Message routed to:  Primary Care p [phone]

## 2018-04-16 NOTE — TELEPHONE ENCOUNTER
Form to clarify has been placed on Dr Trujillo's desk to fill out and sign. Waiting reply.   Michelle MELENDEZ

## 2018-04-17 ENCOUNTER — MYC MEDICAL ADVICE (OUTPATIENT)
Dept: PEDIATRICS | Facility: CLINIC | Age: 83
End: 2018-04-17

## 2018-04-17 ENCOUNTER — TELEPHONE (OUTPATIENT)
Dept: ORTHOPEDICS | Facility: CLINIC | Age: 83
End: 2018-04-17

## 2018-04-17 DIAGNOSIS — K27.9 PEPTIC ULCER: ICD-10-CM

## 2018-04-17 NOTE — TELEPHONE ENCOUNTER
Received VM from patient's  to help get Nancy scheduled with Dr. Fletcher for a NEW consult. Routed to [facility] to help faciliate scheduling this.

## 2018-04-18 RX ORDER — PANTOPRAZOLE SODIUM 20 MG/1
TABLET, DELAYED RELEASE ORAL
Qty: 90 TABLET | Refills: 3 | Status: SHIPPED | OUTPATIENT
Start: 2018-04-18 | End: 2018-08-27

## 2018-04-18 NOTE — TELEPHONE ENCOUNTER
pantoprazole (PROTONIX) 20 MG EC tablet 90 tablet 3 4/5/2018  --   Sig: Take by mouth 30-60 minutes before a meal.     The following message was sent to [facility] pharmacy.     Please note the new dosage change. Patient is now taking 1 tablet (20 mg) instead of 40 mg. If you have questions please call us [phone] and ask for nurse.    Evelin Esqueda RN

## 2018-04-19 ENCOUNTER — RADIANT APPOINTMENT (OUTPATIENT)
Dept: GENERAL RADIOLOGY | Facility: CLINIC | Age: 83
End: 2018-04-19
Attending: PREVENTIVE MEDICINE
Payer: COMMERCIAL

## 2018-04-19 ENCOUNTER — OFFICE VISIT (OUTPATIENT)
Dept: ORTHOPEDICS | Facility: CLINIC | Age: 83
End: 2018-04-19
Payer: COMMERCIAL

## 2018-04-19 VITALS
DIASTOLIC BLOOD PRESSURE: 58 MMHG | HEART RATE: 60 BPM | HEIGHT: 58 IN | WEIGHT: 116 LBS | SYSTOLIC BLOOD PRESSURE: 119 MMHG | BODY MASS INDEX: 24.35 KG/M2

## 2018-04-19 DIAGNOSIS — G89.29 CHRONIC BILATERAL LOW BACK PAIN WITHOUT SCIATICA: Primary | ICD-10-CM

## 2018-04-19 DIAGNOSIS — G47.01 INSOMNIA DUE TO MEDICAL CONDITION: ICD-10-CM

## 2018-04-19 DIAGNOSIS — M54.50 CHRONIC BILATERAL LOW BACK PAIN WITHOUT SCIATICA: Primary | ICD-10-CM

## 2018-04-19 DIAGNOSIS — G89.29 CHRONIC BILATERAL LOW BACK PAIN WITHOUT SCIATICA: ICD-10-CM

## 2018-04-19 DIAGNOSIS — M48.00 SPINAL STENOSIS, UNSPECIFIED SPINAL REGION: ICD-10-CM

## 2018-04-19 DIAGNOSIS — M54.50 CHRONIC BILATERAL LOW BACK PAIN WITHOUT SCIATICA: ICD-10-CM

## 2018-04-19 DIAGNOSIS — F40.240 CLAUSTROPHOBIA: ICD-10-CM

## 2018-04-19 PROCEDURE — 72100 X-RAY EXAM L-S SPINE 2/3 VWS: CPT | Performed by: RADIOLOGY

## 2018-04-19 PROCEDURE — 99214 OFFICE O/P EST MOD 30 MIN: CPT | Performed by: PREVENTIVE MEDICINE

## 2018-04-19 RX ORDER — TRAZODONE HYDROCHLORIDE 50 MG/1
25-50 TABLET, FILM COATED ORAL
Qty: 30 TABLET | Refills: 1 | Status: SHIPPED | OUTPATIENT
Start: 2018-04-19 | End: 2018-08-27 | Stop reason: ALTCHOICE

## 2018-04-19 RX ORDER — PREDNISONE 20 MG/1
10 TABLET ORAL DAILY
Qty: 3 TABLET | Refills: 0 | Status: SHIPPED | OUTPATIENT
Start: 2018-04-19 | End: 2018-04-24

## 2018-04-19 RX ORDER — LORAZEPAM 0.5 MG/1
0.25-0.5 TABLET ORAL
Qty: 4 TABLET | Refills: 0 | Status: SHIPPED | OUTPATIENT
Start: 2018-04-19 | End: 2018-09-19

## 2018-04-19 RX ORDER — METHYLPREDNISOLONE 4 MG
TABLET, DOSE PACK ORAL
Qty: 21 TABLET | Refills: 0 | Status: SHIPPED | OUTPATIENT
Start: 2018-04-19 | End: 2018-04-19

## 2018-04-19 ASSESSMENT — PAIN SCALES - GENERAL: PAINLEVEL: NO PAIN (0)

## 2018-04-19 NOTE — LETTER
4/19/2018         RE: Nancy Benz  [id number] [address] [place] [id number]        Dear Colleague,    Thank you for referring your patient, Nancy Benz, to the [facility]. Please see a copy of my visit note below.    HISTORY OF PRESENT ILLNESS  Ms. Benz is a pleasant 89 year old year old female who presents to clinic today with ongoing low back pain. She has known spinal stenosis and ddd, and several years ago had an ANA that helped some of her symptoms. She is not sleeping at night due pain, and she has more pain with standing and walking.   Nancy explains that she would be ok with the idea of another ANA if needed  Location: low back  Quality:  achy pain    Severity: 9/10 at worst    Duration: years, worse over several months  Timing: occurs with standing and walking  Modifying factors:  resting and non-use makes it better, movement and use makes it worse  Associated signs & symptoms: no radiation down legs  Previous similar pain: yes    Additional history: as documented    MEDICAL HISTORY  Patient Active Problem List   Diagnosis     Glaucoma     Keratoconjunctivitis sicca, not specified as Sjogren's, left     Cataract     Pseudophakia of both eyes     Presbyopia     Monoclonal gammopathy     Nonrheumatic aortic valve insufficiency     Dizziness     History of vertebral fracture     Nocturnal oxygen desaturation     Urgency incontinence     Hyperlipidemia LDL goal <130     Depression with anxiety     Restless leg syndrome     Mild persistent asthma without complication     ACP (advance care planning)     Chronic constipation     Prediabetes     Simple chronic bronchitis (H)     Senile osteoporosis     Essential hypertension       Current Outpatient Prescriptions   Medication Sig Dispense Refill     albuterol (2.5 MG/3ML) 0.083% neb solution Take 1 vial by nebulization every 6 hours as needed for shortness of breath / dyspnea or wheezing       albuterol (PROAIR  HFA/PROVENTIL HFA/VENTOLIN HFA) 108 (90 BASE) MCG/ACT Inhaler Inhale 2 puffs into the lungs every 4 hours as needed for shortness of breath / dyspnea or wheezing 3 Inhaler 3     bimatoprost (LUMIGAN) 0.01 % SOLN Place 1 drop into both eyes At Bedtime 3 Bottle 11     brimonidine-timolol (COMBIGAN) 0.2-0.5 % ophthalmic solution Place 1 drop into both eyes 2 times daily 10 mL 11     Calcium Carb-Cholecalciferol (CALCIUM 600 + D) 600-200 MG-UNIT TABS Take 1 tablet by mouth daily Vitamin X=0674 iu       cycloSPORINE (RESTASIS) 0.05 % ophthalmic emulsion Place 1 drop into both eyes every 12 hours 1 Box 10     DILT- MG 24 hr capsule TAKE 1 CAPSULE EVERY DAY 90 capsule 0     doxepin (SINEQUAN) 10 MG/ML (HIGH CONC) solution Take 0.3 mLs (3 mg) by mouth At Bedtime 118 mL 0     FLUoxetine (PROZAC) 20 MG capsule Take 1 capsule (20 mg) by mouth daily 90 capsule 1     fluticasone (FLOVENT HFA) 220 MCG/ACT Inhaler Inhale 2 puffs into the lungs 2 times daily 36 g 3     gabapentin (NEURONTIN) 100 MG capsule Take 2 capsules (200 mg) by mouth daily (late afternoon) 180 capsule 3     ipratropium - albuterol 0.5 mg/2.5 mg/3 mL (DUONEB) 0.5-2.5 (3) MG/3ML Take 1 vial by nebulization every 6 hours as needed for shortness of breath / dyspnea or wheezing       pantoprazole (PROTONIX) 20 MG EC tablet Take by mouth 30-60 minutes before a meal. 90 tablet 3     polyethylene glycol 0.4%- propylene glycol 0.3% (SYSTANE) 0.4-0.3 % SOLN ophthalmic solution Place 1 drop into both eyes 3 times daily as needed for dry eyes       pravastatin (PRAVACHOL) 40 MG tablet TAKE 1/2 TABLET EVERY DAY 45 tablet 0     Respiratory Therapy Supplies [facility]ERTO [facility] or equivalent to use with spacer. 1 each 0     senna-docusate (SENOKOT-S;PERICOLACE) 8.6-50 MG per tablet Take 1 tablet by mouth daily         Allergies   Allergen Reactions     Hydrocodone-Acetaminophen Other (See Comments)     Nausea, dizzy, felt awful     Pilocarpine      Timolol Difficulty  "breathing     Makes asthma worse    Oral tabs.. ophth sol does not cause a problem       Family History   Problem Relation Age of Onset     Glaucoma Son      Glaucoma Daughter        Additional medical/Social/Surgical histories reviewed in [facility] and updated as appropriate.     REVIEW OF SYSTEMS (4/19/2018)  10 point ROS of systems including Constitutional, Eyes, Respiratory, Cardiovascular, Gastroenterology, Genitourinary, Integumentary, Musculoskeletal, Psychiatric were all negative except for pertinent positives noted in my HPI.     PHYSICAL EXAM  Vitals:    04/19/18 1044   BP: 119/58   Pulse: 60   Weight: 52.6 kg (116 lb)   Height: 1.467 m (4' 9.75\")     Vital Signs: /58  Pulse 60  Ht 1.467 m (4' 9.75\")  Wt 52.6 kg (116 lb)  BMI 24.45 kg/m2 Patient declined being weighed. Body mass index is 24.45 kg/(m^2).    General  - normal appearance, in no obvious distress  CV  - normal peripheral perfusion  Pulm  - normal respiratory pattern, non-labored  Musculoskeletal - lumbar spine  - stance: normal gait without limp, no obvious leg length discrepancy, using walker for assistance but able to ambulate on her own  - inspection: normal bone and joint alignment, has lumbar kyphoscoliosis  - palpation: no paravertebral or bony tenderness  - ROM: flexion exacerbates pain in low back, abnormal extension, sidebending, rotation are limited by pain and stiffness  - strength: lower extremities 5/5 in all planes  - special tests:  (+) straight leg raise- low back pain  (+) slump test- low back pain  Neuro  - patellar and Achilles DTRs 2+ bilaterally, NO lower extremity sensory deficit throughout L5 distribution, grossly normal coordination, normal muscle tone  Skin  - no ecchymosis, erythema, warmth, or induration, no obvious rash  Psych  - interactive, appropriate, normal mood and affect  ASSESSMENT & PLAN  89 yo female with lumbar ddd, spinal stenosis  Reviewed lumbar xray shows ddd, stenosis, and ordered MRI with a plan " to get Rosendo  Will refer for PT after ROSENDO  Start medrol pack and     Norris Fletcher MD, CAQSM      See other note      Again, thank you for allowing me to participate in the care of your patient.        Sincerely,        Norris Fletcher MD

## 2018-04-19 NOTE — PATIENT INSTRUCTIONS
Thanks for coming today.  [facility]  [address] [place] [address]    To schedule future appointments in Ortho Clinic, you may call [phone].    To schedule ordered imaging by your provider:   Call [facility] [phone]    To schedule an injection ordered by your provider:  Call [facility] Injection scheduling line: [phone]  [employer] available online at:  [email or web address]    Please call if any further questions or concerns ([phone]).  Clinic hours 8 am to 5 pm.    Return to clinic (call) if symptoms worsen or fail to improve.

## 2018-04-19 NOTE — NURSING NOTE
"Nancy Benz's goals for this visit include: Low back evaluation   She requests these members of her care team be copied on today's visit information: no    PCP: Candy Trujillo    Referring Provider:  No referring provider defined for this encounter.    Chief Complaint   Patient presents with     Back Pain     Low back pain        Initial /58  Pulse 60  Ht 1.467 m (4' 9.75\")  Wt 52.6 kg (116 lb)  BMI 24.45 kg/m2 Estimated body mass index is 24.45 kg/(m^2) as calculated from the following:    Height as of this encounter: 1.467 m (4' 9.75\").    Weight as of this encounter: 52.6 kg (116 lb).  Medication Reconciliation: complete  "

## 2018-04-19 NOTE — PROGRESS NOTES
HISTORY OF PRESENT ILLNESS  Ms. Benz is a pleasant 89 year old year old female who presents to clinic today with ongoing low back pain. She has known spinal stenosis and ddd, and several years ago had an ANA that helped some of her symptoms. She is not sleeping at night due pain, and she has more pain with standing and walking.   Nancy explains that she would be ok with the idea of another ANA if needed  Location: low back  Quality:  achy pain    Severity: 9/10 at worst    Duration: years, worse over several months  Timing: occurs with standing and walking  Modifying factors:  resting and non-use makes it better, movement and use makes it worse  Associated signs & symptoms: no radiation down legs  Previous similar pain: yes    Additional history: as documented    MEDICAL HISTORY  Patient Active Problem List   Diagnosis     Glaucoma     Keratoconjunctivitis sicca, not specified as Sjogren's, left     Cataract     Pseudophakia of both eyes     Presbyopia     Monoclonal gammopathy     Nonrheumatic aortic valve insufficiency     Dizziness     History of vertebral fracture     Nocturnal oxygen desaturation     Urgency incontinence     Hyperlipidemia LDL goal <130     Depression with anxiety     Restless leg syndrome     Mild persistent asthma without complication     ACP (advance care planning)     Chronic constipation     Prediabetes     Simple chronic bronchitis (H)     Senile osteoporosis     Essential hypertension       Current Outpatient Prescriptions   Medication Sig Dispense Refill     albuterol (2.5 MG/3ML) 0.083% neb solution Take 1 vial by nebulization every 6 hours as needed for shortness of breath / dyspnea or wheezing       albuterol (PROAIR HFA/PROVENTIL HFA/VENTOLIN HFA) 108 (90 BASE) MCG/ACT Inhaler Inhale 2 puffs into the lungs every 4 hours as needed for shortness of breath / dyspnea or wheezing 3 Inhaler 3     bimatoprost (LUMIGAN) 0.01 % SOLN Place 1 drop into both eyes At Bedtime 3 Bottle 11      brimonidine-timolol (COMBIGAN) 0.2-0.5 % ophthalmic solution Place 1 drop into both eyes 2 times daily 10 mL 11     Calcium Carb-Cholecalciferol (CALCIUM 600 + D) 600-200 MG-UNIT TABS Take 1 tablet by mouth daily Vitamin M=8600 iu       cycloSPORINE (RESTASIS) 0.05 % ophthalmic emulsion Place 1 drop into both eyes every 12 hours 1 Box 10     DILT- MG 24 hr capsule TAKE 1 CAPSULE EVERY DAY 90 capsule 0     doxepin (SINEQUAN) 10 MG/ML (HIGH CONC) solution Take 0.3 mLs (3 mg) by mouth At Bedtime 118 mL 0     FLUoxetine (PROZAC) 20 MG capsule Take 1 capsule (20 mg) by mouth daily 90 capsule 1     fluticasone (FLOVENT HFA) 220 MCG/ACT Inhaler Inhale 2 puffs into the lungs 2 times daily 36 g 3     gabapentin (NEURONTIN) 100 MG capsule Take 2 capsules (200 mg) by mouth daily (late afternoon) 180 capsule 3     ipratropium - albuterol 0.5 mg/2.5 mg/3 mL (DUONEB) 0.5-2.5 (3) MG/3ML Take 1 vial by nebulization every 6 hours as needed for shortness of breath / dyspnea or wheezing       pantoprazole (PROTONIX) 20 MG EC tablet Take by mouth 30-60 minutes before a meal. 90 tablet 3     polyethylene glycol 0.4%- propylene glycol 0.3% (SYSTANE) 0.4-0.3 % SOLN ophthalmic solution Place 1 drop into both eyes 3 times daily as needed for dry eyes       pravastatin (PRAVACHOL) 40 MG tablet TAKE 1/2 TABLET EVERY DAY 45 tablet 0     Respiratory Therapy Supplies ROBERTO Optichamber or equivalent to use with spacer. 1 each 0     senna-docusate (SENOKOT-S;PERICOLACE) 8.6-50 MG per tablet Take 1 tablet by mouth daily         Allergies   Allergen Reactions     Hydrocodone-Acetaminophen Other (See Comments)     Nausea, dizzy, felt awful     Pilocarpine      Timolol Difficulty breathing     Makes asthma worse    Oral tabs.. ophth sol does not cause a problem       Family History   Problem Relation Age of Onset     Glaucoma Son      Glaucoma Daughter        Additional medical/Social/Surgical histories reviewed in EPIC and updated as  "appropriate.     REVIEW OF SYSTEMS (4/19/2018)  10 point ROS of systems including Constitutional, Eyes, Respiratory, Cardiovascular, Gastroenterology, Genitourinary, Integumentary, Musculoskeletal, Psychiatric were all negative except for pertinent positives noted in my HPI.     PHYSICAL EXAM  Vitals:    04/19/18 1044   BP: 119/58   Pulse: 60   Weight: 52.6 kg (116 lb)   Height: 1.467 m (4' 9.75\")     Vital Signs: /58  Pulse 60  Ht 1.467 m (4' 9.75\")  Wt 52.6 kg (116 lb)  BMI 24.45 kg/m2 Patient declined being weighed. Body mass index is 24.45 kg/(m^2).    General  - normal appearance, in no obvious distress  CV  - normal peripheral perfusion  Pulm  - normal respiratory pattern, non-labored  Musculoskeletal - lumbar spine  - stance: normal gait without limp, no obvious leg length discrepancy, using walker for assistance but able to ambulate on her own  - inspection: normal bone and joint alignment, has lumbar kyphoscoliosis  - palpation: no paravertebral or bony tenderness  - ROM: flexion exacerbates pain in low back, abnormal extension, sidebending, rotation are limited by pain and stiffness  - strength: lower extremities 5/5 in all planes  - special tests:  (+) straight leg raise- low back pain  (+) slump test- low back pain  Neuro  - patellar and Achilles DTRs 2+ bilaterally, NO lower extremity sensory deficit throughout L5 distribution, grossly normal coordination, normal muscle tone  Skin  - no ecchymosis, erythema, warmth, or induration, no obvious rash  Psych  - interactive, appropriate, normal mood and affect  ASSESSMENT & PLAN  89 yo female with lumbar ddd, spinal stenosis  Reviewed lumbar xray shows ddd, stenosis, and ordered MRI with a plan to get Rosendo  Will refer for PT after ROSENDO  Start medrol pack and     Norris Fletcher MD, CAQSM    "

## 2018-04-19 NOTE — MR AVS SNAPSHOT
After Visit Summary   2018    Nancy Benz    MRN: [id number]           Patient Information     Date Of Birth          1922        Visit Information        Provider Department      2018 10:40 AM Norris Fletcher MD [facility]        Today's Diagnoses     Chronic bilateral low back pain without sciatica    -  1    Spinal stenosis, unspecified spinal region        Insomnia due to medical condition        Claustrophobia          Care Instructions    Thanks for coming today.  [facility]  [address] [place] [id number]    To schedule future appointments in Ortho Clinic, you may call [phone].    To schedule ordered imaging by your provider:   Call Central Imaging [phone]    To schedule an injection ordered by your provider:  Call Central Imaging Injection scheduling line: [phone]  MyChart available online at:  [email or web address]    Please call if any further questions or concerns ([phone]).  Clinic hours 8 am to 5 pm.    Return to clinic (call) if symptoms worsen or fail to improve.          Follow-ups after your visit        Your next 10 appointments already scheduled     2018 12:00 PM CDT   Office Visit with PFT LAB   [facility] ([facility])    [address] [place] [id number] [phone]           Bring a current list of meds and any records pertaining to this visit. For Physicals, please bring immunization records and any forms needing to be filled out. Please arrive 10 minutes early to complete paperwork.            2018  1:15 PM CDT   (Arrive by 1:00 PM)   MR LUMBAR SPINE W/O CONTRAST with MGMR1   [facility] ([facility])    [address] [place] [address] [phone]           Take your medicines as usual, unless your doctor tells you not to. Bring a list of your current  medicines to your exam (including vitamins, minerals and over-the-counter drugs). Also bring the results of similar scans you may have had.  Please remove any body piercings and hair extensions before you arrive.  Follow your doctor s orders. If you do not, we may have to postpone your exam.  You may or may not receive IV contrast for this exam pending the discretion of the Radiologist.  You do not need to do anything special to prepare.  The MRI machine uses a strong magnet. Please wear clothes without metal (snaps, zippers). A sweatsuit works well, or we may give you a hospital gown.   **IMPORTANT** THE INSTRUCTIONS BELOW ARE ONLY FOR THOSE PATIENTS WHO HAVE BEEN PRESCRIBED SEDATION OR GENERAL ANESTHESIA DURING THEIR MRI PROCEDURE:  IF YOUR DOCTOR PRESCRIBED ORAL SEDATION (take medicine to help you relax during your exam):   You must get the medicine from your doctor (oral medication) before you arrive. Bring the medicine to the exam. Do not take it at home. You ll be told when to take it upon arriving for your exam.   Arrive one hour early. Bring someone who can take you home after the test. Your medicine will make you sleepy. After the exam, you may not drive, take a bus or take a taxi by yourself.  IF YOUR DOCTOR PRESCRIBED IV SEDATION:   Arrive one hour early. Bring someone who can take you home after the test. Your medicine will make you sleepy. After the exam, you may not drive, take a bus or take a taxi by yourself.   No eating 6 hours before your exam. You may have clear liquids up until 4 hours before your exam. (Clear liquids include water, clear tea, black coffee and fruit juice without pulp.)  IF YOUR DOCTOR PRESCRIBED ANESTHESIA (be asleep for your exam):   Arrive 1 1/2 hours early. Bring someone who can take you home after the test. You may not drive, take a bus or take a taxi by yourself.   No eating 8 hours before your exam. You may have clear liquids up until 4 hours before your exam. (Clear  liquids include water, clear tea, black coffee and fruit juice without pulp.)   You will spend four to five hours in the recovery room.  Please call the Imaging Department at your exam site with any questions.            May 01, 2018  4:00 PM CDT   New Visit with Flor Fraser MD   [facility])    [address] [address] [place] [id number]   [phone]            May 08, 2018  8:30 AM CDT   Return Visit with Lee Reyes MD, [facility] NURSE ONLY   [facility])    [address] [place] [id number] [phone]            Jul 31, 2018  1:00 PM CDT   Return Visit with Sobia Terrazas MD   [facility])    [address] [place] [id number] [phone]            Sep 19, 2018  9:00 AM CDT   LAB with LAB FIRST FLOOR [facility])    [id number] [place] [id number] [phone]           Please do not eat 10-12 hours before your appointment if you are coming in fasting for labs on lipids, cholesterol, or glucose (sugar). This does not apply to pregnant women. Water, hot tea and black coffee (with nothing added) are okay. Do not drink other fluids, diet soda or chew gum.            Sep 19, 2018  9:30 AM CDT   Return Visit with Candy Trujillo MD PhD   [facility])    [address] [place] [id number] [phone]              Future tests that were ordered for you today     Open Future Orders        Priority Expected Expires Ordered    MR Lumbar Spine w/o Contrast Routine  4/19/2019 4/19/2018            Who to contact     If you have questions or need follow up information about today's clinic visit or your schedule please contact [facility] directly at [phone].  Normal or non-critical  "lab and imaging results will be communicated to you by MyChart, letter or phone within 4 business days after the clinic has received the results. If you do not hear from us within 7 days, please contact the clinic through [employer] or phone. If you have a critical or abnormal lab result, we will notify you by phone as soon as possible.  Submit refill requests through [employer] or call your pharmacy and they will forward the refill request to us. Please allow 3 business days for your refill to be completed.          Additional Information About Your Visit        [employer] Information     [employer] gives you secure access to your electronic health record. If you see a primary care provider, you can also send messages to your care team and make appointments. If you have questions, please call your primary care clinic.  If you do not have a primary care provider, please call [phone] and they will assist you.      [employer] is an electronic gateway that provides easy, online access to your medical records. With [employer], you can request a clinic appointment, read your test results, renew a prescription or communicate with your care team.     To access your existing account, please contact your [facility] or call [phone] for assistance.        Care EveryWhere ID     This is your Care EveryWhere ID. This could be used by other organizations to access your [facility] medical records  [id number]        Your Vitals Were     Pulse Height BMI (Body Mass Index)             60 1.467 m (4' 9.75\") 24.45 kg/m2          Blood Pressure from Last 3 Encounters:   04/19/18 119/58   03/15/18 110/64   01/18/18 132/64    Weight from Last 3 Encounters:   04/19/18 52.6 kg (116 lb)   03/15/18 52.6 kg (116 lb)   01/18/18 51.7 kg (114 lb)                 Today's Medication Changes          These changes are accurate as of 4/19/18 12:03 PM.  If you have any questions, ask your nurse or doctor.               Start " taking these medicines.        Dose/Directions    LORazepam 0.5 MG tablet   Commonly known as:  ATIVAN   Used for:  Claustrophobia   Started by:  Norris Fletcher MD        Dose:  0.25-0.5 mg   Take 0.5-1 tablets (0.25-0.5 mg) by mouth once as needed for anxiety (take 30 minutes prior to MRI) Take 30 minutes prior to departure.  Do not operate a vehicle after taking this medication   Quantity:  4 tablet   Refills:  0       predniSONE 20 MG tablet   Commonly known as:  DELTASONE   Used for:  Spinal stenosis, unspecified spinal region, Chronic bilateral low back pain without sciatica   Started by:  Norris Fletcher MD        Dose:  10 mg   Take 0.5 tablets (10 mg) by mouth daily for 5 days   Quantity:  3 tablet   Refills:  0       traZODone 50 MG tablet   Commonly known as:  DESYREL   Used for:  Insomnia due to medical condition   Started by:  Norris Fletcher MD        Dose:  25-50 mg   Take 0.5-1 tablets (25-50 mg) by mouth nightly as needed for sleep   Quantity:  30 tablet   Refills:  1            Where to get your medicines      These medications were sent to [facility] - [place], [place] - [address], [address]  [address], [facility] [address]     Phone:  [phone]     predniSONE 20 MG tablet    traZODone 50 MG tablet         Some of these will need a paper prescription and others can be bought over the counter.  Ask your nurse if you have questions.     Bring a paper prescription for each of these medications     LORazepam 0.5 MG tablet                Primary Care Provider Office Phone # Fax #    Candy Trujillo MD PhD [phone]       [address]  [facility] [address]        Equal Access to Services     CUATE FRANCO : Hadmansoor Espino, waaxda luqadaha, qaybta kaalmada adeyeyada, clark topete. So [facility] [phone].    ATENCIÓN: Si habla español, tiene a crandall disposición servicios gratuitos de  lillie durantconstantin. Mark al [phone].    We comply with applicable federal civil rights laws and [place] laws. We do not discriminate on the basis of race, color, national origin, age, disability, sex, sexual orientation, or gender identity.            Thank you!     Thank you for choosing [facility]  for your care. Our goal is always to provide you with excellent care. Hearing back from our patients is one way we can continue to improve our services. Please take a few minutes to complete the written survey that you may receive in the mail after your visit with us. Thank you!             Your Updated Medication List - Protect others around you: Learn how to safely use, store and throw away your medicines at www.disposemymeds.org.          This list is accurate as of 4/19/18 12:03 PM.  Always use your most recent med list.                   Brand Name Dispense Instructions for use Diagnosis    * albuterol (2.5 MG/3ML) 0.083% neb solution      Take 1 vial by nebulization every 6 hours as needed for shortness of breath / dyspnea or wheezing        * albuterol 108 (90 Base) MCG/ACT Inhaler    PROAIR HFA/PROVENTIL HFA/VENTOLIN HFA    3 Inhaler    Inhale 2 puffs into the lungs every 4 hours as needed for shortness of breath / dyspnea or wheezing    Moderate persistent asthma with exacerbation       bimatoprost 0.01 % Soln    LUMIGAN    3 Bottle    Place 1 drop into both eyes At Bedtime    Primary open angle glaucoma of both eyes, unspecified glaucoma stage       brimonidine-timolol 0.2-0.5 % ophthalmic solution    COMBIGAN    10 mL    Place 1 drop into both eyes 2 times daily    Primary open angle glaucoma of both eyes, moderate stage       CALCIUM 600 + D 600-200 MG-UNIT Tabs   Generic drug:  Calcium Carb-Cholecalciferol      Take 1 tablet by mouth daily Vitamin G=0345 iu    Keratoconjunctivitis sicca, not specified as Sjogren's, left, Glaucoma       cycloSPORINE 0.05 % ophthalmic emulsion     RESTASIS    1 Box    Place 1 drop into both eyes every 12 hours    Keratoconjunctivitis sicca, not specified as Sjogren's, left       DILT- MG 24 hr capsule   Generic drug:  diltiazem     90 capsule    TAKE 1 CAPSULE EVERY DAY    Essential hypertension with goal blood pressure less than 140/90       doxepin 10 MG/ML (HIGH CONC) solution    SINEquan    118 mL    Take 0.3 mLs (3 mg) by mouth At Bedtime    Chronic insomnia       FLUoxetine 20 MG capsule    PROzac    90 capsule    Take 1 capsule (20 mg) by mouth daily    Depression with anxiety       fluticasone 220 MCG/ACT Inhaler    FLOVENT HFA    36 g    Inhale 2 puffs into the lungs 2 times daily    Simple chronic bronchitis (H)       gabapentin 100 MG capsule    NEURONTIN    180 capsule    Take 2 capsules (200 mg) by mouth daily (late afternoon)    Restless leg syndrome       ipratropium - albuterol 0.5 mg/2.5 mg/3 mL 0.5-2.5 (3) MG/3ML    DUONEB     Take 1 vial by nebulization every 6 hours as needed for shortness of breath / dyspnea or wheezing        LORazepam 0.5 MG tablet    ATIVAN    4 tablet    Take 0.5-1 tablets (0.25-0.5 mg) by mouth once as needed for anxiety (take 30 minutes prior to MRI) Take 30 minutes prior to departure.  Do not operate a vehicle after taking this medication    Claustrophobia       pantoprazole 20 MG EC tablet    PROTONIX    90 tablet    Take by mouth 30-60 minutes before a meal.    Peptic ulcer       pravastatin 40 MG tablet    PRAVACHOL    45 tablet    TAKE 1/2 TABLET EVERY DAY    Hyperlipidemia LDL goal <100       predniSONE 20 MG tablet    DELTASONE    3 tablet    Take 0.5 tablets (10 mg) by mouth daily for 5 days    Spinal stenosis, unspecified spinal region, Chronic bilateral low back pain without sciatica       Respiratory Therapy Supplies Deja     1 each    Optichamber or equivalent to use with spacer.    Moderate persistent asthma without complication       senna-docusate 8.6-50 MG per tablet    SENOKOT-S;PERICOLACE      Take 1 tablet by mouth daily        SYSTANE 0.4-0.3 % Soln ophthalmic solution   Generic drug:  polyethylene glycol 0.4%- propylene glycol 0.3%      Place 1 drop into both eyes 3 times daily as needed for dry eyes    Keratoconjunctivitis sicca, not specified as Sjogren's, left, Glaucoma       traZODone 50 MG tablet    DESYREL    30 tablet    Take 0.5-1 tablets (25-50 mg) by mouth nightly as needed for sleep    Insomnia due to medical condition       * Notice:  This list has 2 medication(s) that are the same as other medications prescribed for you. Read the directions carefully, and ask your doctor or other care provider to review them with you.

## 2018-04-26 ENCOUNTER — OFFICE VISIT (OUTPATIENT)
Dept: NURSING | Facility: CLINIC | Age: 83
End: 2018-04-26
Payer: COMMERCIAL

## 2018-04-26 DIAGNOSIS — J44.9 COPD (CHRONIC OBSTRUCTIVE PULMONARY DISEASE) (H): Primary | ICD-10-CM

## 2018-04-26 LAB
DLCOUNC-%PRED-PRE: 63 %
DLCOUNC-PRE: 10.19 ML/MIN/MMHG
DLCOUNC-PRED: 16.14 ML/MIN/MMHG
ERV-%PRED-PRE: 86 %
ERV-PRE: 0.32 L
ERV-PRED: 0.37 L
EXPTIME-PRE: 6.69 SEC
FEF2575-PRE: 0.64 L/SEC
FEFMAX-%PRED-PRE: 139 %
FEFMAX-PRE: 3.45 L/SEC
FEFMAX-PRED: 2.47 L/SEC
FEV1-PRE: 1.02 L
FEV1FEV6-PRE: 72 %
FEV1FEV6-PRED: 75 %
FEV1FVC-PRE: 72 %
FEV1FVC-PRED: 70 %
FEV1SVC-PRE: 59 %
FIFMAX-PRE: 1.55 L/SEC
FRCPLETH-%PRED-PRE: 73 %
FRCPLETH-PRE: 1.86 L
FRCPLETH-PRED: 2.52 L
FVC-PRE: 1.42 L
IC-%PRED-PRE: 84 %
IC-PRE: 1.4 L
IC-PRED: 1.67 L
RVPLETH-%PRED-PRE: 67 %
RVPLETH-PRE: 1.54 L
RVPLETH-PRED: 2.3 L
TLCPLETH-%PRED-PRE: 75 %
TLCPLETH-PRE: 3.26 L
TLCPLETH-PRED: 4.31 L
VA-%PRED-PRE: 58 %
VA-PRE: 2.61 L
VC-%PRED-PRE: 84 %
VC-PRE: 1.72 L
VC-PRED: 2.04 L

## 2018-04-26 PROCEDURE — 94726 PLETHYSMOGRAPHY LUNG VOLUMES: CPT | Performed by: INTERNAL MEDICINE

## 2018-04-26 PROCEDURE — 94729 DIFFUSING CAPACITY: CPT | Performed by: INTERNAL MEDICINE

## 2018-04-26 PROCEDURE — 94375 RESPIRATORY FLOW VOLUME LOOP: CPT | Performed by: INTERNAL MEDICINE

## 2018-04-26 PROCEDURE — 99207 ZZC DROP WITH A PROCEDURE: CPT | Performed by: INTERNAL MEDICINE

## 2018-04-26 NOTE — MR AVS SNAPSHOT
After Visit Summary   4/26/2018    Nancy Benz    MRN: [id number]           Patient Information     Date Of Birth          8/5/1922        Visit Information        Provider Department      4/26/2018 12:00 PM PFT LAB [facility]        Today's Diagnoses     COPD (chronic obstructive pulmonary disease) (H)    -  1       Follow-ups after your visit        Your next 10 appointments already scheduled     May 01, 2018  4:00 PM CDT   New Visit with Flor Fraser MD   [facility])    [address] [place] [address]   [phone]            May 03, 2018 10:15 AM CDT   (Arrive by 10:00 AM)   MR LUMBAR SPINE W/O CONTRAST with MGMR1   [facility])    [address] [place] [id number] [phone]           Take your medicines as usual, unless your doctor tells you not to. Bring a list of your current medicines to your exam (including vitamins, minerals and over-the-counter drugs). Also bring the results of similar scans you may have had.  Please remove any body piercings and hair extensions before you arrive.  Follow your doctor s orders. If you do not, we may have to postpone your exam.  You may or may not receive IV contrast for this exam pending the discretion of the Radiologist.  You do not need to do anything special to prepare.  The MRI machine uses a strong magnet. Please wear clothes without metal (snaps, zippers). A sweatsuit works well, or we may give you a hospital gown.   **IMPORTANT** THE INSTRUCTIONS BELOW ARE ONLY FOR THOSE PATIENTS WHO HAVE BEEN PRESCRIBED SEDATION OR GENERAL ANESTHESIA DURING THEIR MRI PROCEDURE:  IF YOUR DOCTOR PRESCRIBED ORAL SEDATION (take medicine to help you relax during your exam):   You must get the medicine from your doctor (oral medication) before you arrive. Bring the medicine to the exam. Do not take it at home.  You ll be told when to take it upon arriving for your exam.   Arrive one hour early. Bring someone who can take you home after the test. Your medicine will make you sleepy. After the exam, you may not drive, take a bus or take a taxi by yourself.  IF YOUR DOCTOR PRESCRIBED IV SEDATION:   Arrive one hour early. Bring someone who can take you home after the test. Your medicine will make you sleepy. After the exam, you may not drive, take a bus or take a taxi by yourself.   No eating 6 hours before your exam. You may have clear liquids up until 4 hours before your exam. (Clear liquids include water, clear tea, black coffee and fruit juice without pulp.)  IF YOUR DOCTOR PRESCRIBED ANESTHESIA (be asleep for your exam):   Arrive 1 1/2 hours early. Bring someone who can take you home after the test. You may not drive, take a bus or take a taxi by yourself.   No eating 8 hours before your exam. You may have clear liquids up until 4 hours before your exam. (Clear liquids include water, clear tea, black coffee and fruit juice without pulp.)   You will spend four to five hours in the recovery room.  Please call the Imaging Department at your exam site with any questions.            May 08, 2018  8:30 AM CDT   Return Visit with Lee Reyes MD, [facility] NURSE ONLY   Formerly named [facility])    [address] [facility] [address]   [phone]            Jul 31, 2018  1:00 PM CDT   Return Visit with Sobia Terrazas MD   Formerly named [facility])    [address] [facility] [address]   [phone]            Sep 19, 2018  9:00 AM CDT   LAB with LAB FIRST FLOOR [facility])    [address] [facility] [address]   [phone]           Please do not eat 10-12 hours before your appointment if you are coming in fasting for labs on lipids, cholesterol, or glucose  (sugar). This does not apply to pregnant women. Water, hot tea and black coffee (with nothing added) are okay. Do not drink other fluids, diet soda or chew gum.            Sep 19, 2018  9:30 AM CDT   Return Visit with Candy Trujillo MD PhD   [facility])    [address] [place] [address] [phone]              Who to contact     If you have questions or need follow up information about today's clinic visit or your schedule please contact [facility] directly at [phone].  Normal or non-critical lab and imaging results will be communicated to you by [employer], letter or phone within 4 business days after the clinic has received the results. If you do not hear from us within 7 days, please contact the clinic through [employer] or phone. If you have a critical or abnormal lab result, we will notify you by phone as soon as possible.  Submit refill requests through [employer] or call your pharmacy and they will forward the refill request to us. Please allow 3 business days for your refill to be completed.          Additional Information About Your Visit        [employer] Information     [employer] gives you secure access to your electronic health record. If you see a primary care provider, you can also send messages to your care team and make appointments. If you have questions, please call your primary care clinic.  If you do not have a primary care provider, please call [phone] and they will assist you.      [employer] is an electronic gateway that provides easy, online access to your medical records. With [employer], you can request a clinic appointment, read your test results, renew a prescription or communicate with your care team.     To access your existing account, please contact your [facility] or call [phone] for assistance.        Care EveryWhere ID     This is your Care EveryWhere ID. This could be used by  other organizations to access your [place] medical records  [id number]         Blood Pressure from Last 3 Encounters:   04/19/18 119/58   03/15/18 110/64   01/18/18 132/64    Weight from Last 3 Encounters:   04/19/18 52.6 kg (116 lb)   03/15/18 52.6 kg (116 lb)   01/18/18 51.7 kg (114 lb)              We Performed the Following     General PFT Lab (Please always keep checked)     HC DIFFUSING CAPACITY     HC PLETHYSMOGRAPHY LUNG VOLUMES W/WO AIRWAY RESIST     RESPIRATORY FLOW VOLUME LOOP        Primary Care Provider Office Phone # Fax #    Candy Trujillo MD PhD [phone]       [address]  [facility] [address]        Equal Access to Services     ASHUTOSH FRANCO : Hadii percy ku hadasho Soomaali, waaxda luqadaha, qaybta kaalmada adeegyada, waxay socorro alfredo . So [facility] [phone].    ATENCIÓN: Si habla español, tiene a crandall disposición servicios gratuitos de asistencia lingüística. [facility] [phone].    We comply with applicable federal civil rights laws and [place] laws. We do not discriminate on the basis of race, color, national origin, age, disability, sex, sexual orientation, or gender identity.            Thank you!     Thank you for choosing [facility]  for your care. Our goal is always to provide you with excellent care. Hearing back from our patients is one way we can continue to improve our services. Please take a few minutes to complete the written survey that you may receive in the mail after your visit with us. Thank you!             Your Updated Medication List - Protect others around you: Learn how to safely use, store and throw away your medicines at www.disposemymeds.org.          This list is accurate as of 4/26/18 12:31 PM.  Always use your most recent med list.                   Brand Name Dispense Instructions for use Diagnosis    * albuterol (2.5 MG/3ML) 0.083% neb solution      Take 1 vial by nebulization every 6 hours as needed for  shortness of breath / dyspnea or wheezing        * albuterol 108 (90 Base) MCG/ACT Inhaler    PROAIR HFA/PROVENTIL HFA/VENTOLIN HFA    3 Inhaler    Inhale 2 puffs into the lungs every 4 hours as needed for shortness of breath / dyspnea or wheezing    Moderate persistent asthma with exacerbation       bimatoprost 0.01 % Soln    LUMIGAN    3 Bottle    Place 1 drop into both eyes At Bedtime    Primary open angle glaucoma of both eyes, unspecified glaucoma stage       brimonidine-timolol 0.2-0.5 % ophthalmic solution    COMBIGAN    10 mL    Place 1 drop into both eyes 2 times daily    Primary open angle glaucoma of both eyes, moderate stage       CALCIUM 600 + D 600-200 MG-UNIT Tabs   Generic drug:  Calcium Carb-Cholecalciferol      Take 1 tablet by mouth daily Vitamin W=4857 iu    Keratoconjunctivitis sicca, not specified as Sjogren's, left, Glaucoma       cycloSPORINE 0.05 % ophthalmic emulsion    RESTASIS    1 Box    Place 1 drop into both eyes every 12 hours    Keratoconjunctivitis sicca, not specified as Sjogren's, left       DILT- MG 24 hr capsule   Generic drug:  diltiazem     90 capsule    TAKE 1 CAPSULE EVERY DAY    Essential hypertension with goal blood pressure less than 140/90       doxepin 10 MG/ML (HIGH CONC) solution    SINEquan    118 mL    Take 0.3 mLs (3 mg) by mouth At Bedtime    Chronic insomnia       FLUoxetine 20 MG capsule    PROzac    90 capsule    Take 1 capsule (20 mg) by mouth daily    Depression with anxiety       fluticasone 220 MCG/ACT Inhaler    FLOVENT HFA    36 g    Inhale 2 puffs into the lungs 2 times daily    Simple chronic bronchitis (H)       gabapentin 100 MG capsule    NEURONTIN    180 capsule    Take 2 capsules (200 mg) by mouth daily (late afternoon)    Restless leg syndrome       ipratropium - albuterol 0.5 mg/2.5 mg/3 mL 0.5-2.5 (3) MG/3ML    DUONEB     Take 1 vial by nebulization every 6 hours as needed for shortness of breath / dyspnea or wheezing        LORazepam 0.5  MG tablet    ATIVAN    4 tablet    Take 0.5-1 tablets (0.25-0.5 mg) by mouth once as needed for anxiety (take 30 minutes prior to MRI) Take 30 minutes prior to departure.  Do not operate a vehicle after taking this medication    Claustrophobia       pantoprazole 20 MG EC tablet    PROTONIX    90 tablet    Take by mouth 30-60 minutes before a meal.    Peptic ulcer       pravastatin 40 MG tablet    PRAVACHOL    45 tablet    TAKE 1/2 TABLET EVERY DAY    Hyperlipidemia LDL goal <100       Respiratory Therapy Supplies Deja     1 each    Optichamber or equivalent to use with spacer.    Moderate persistent asthma without complication       senna-docusate 8.6-50 MG per tablet    SENOKOT-S;PERICOLACE     Take 1 tablet by mouth daily        SYSTANE 0.4-0.3 % Soln ophthalmic solution   Generic drug:  polyethylene glycol 0.4%- propylene glycol 0.3%      Place 1 drop into both eyes 3 times daily as needed for dry eyes    Keratoconjunctivitis sicca, not specified as Sjogren's, left, Glaucoma       traZODone 50 MG tablet    DESYREL    30 tablet    Take 0.5-1 tablets (25-50 mg) by mouth nightly as needed for sleep    Insomnia due to medical condition       * Notice:  This list has 2 medication(s) that are the same as other medications prescribed for you. Read the directions carefully, and ask your doctor or other care provider to review them with you.

## 2018-04-30 ENCOUNTER — MYC MEDICAL ADVICE (OUTPATIENT)
Dept: OPHTHALMOLOGY | Facility: CLINIC | Age: 83
End: 2018-04-30

## 2018-04-30 DIAGNOSIS — H16.222: ICD-10-CM

## 2018-05-01 ENCOUNTER — OFFICE VISIT (OUTPATIENT)
Dept: PULMONOLOGY | Facility: CLINIC | Age: 83
End: 2018-05-01
Payer: COMMERCIAL

## 2018-05-01 VITALS
HEART RATE: 68 BPM | WEIGHT: 121.8 LBS | SYSTOLIC BLOOD PRESSURE: 130 MMHG | OXYGEN SATURATION: 92 % | BODY MASS INDEX: 25.68 KG/M2 | DIASTOLIC BLOOD PRESSURE: 70 MMHG

## 2018-05-01 DIAGNOSIS — J45.20 MILD INTERMITTENT ASTHMA WITHOUT COMPLICATION: Primary | ICD-10-CM

## 2018-05-01 PROCEDURE — 99214 OFFICE O/P EST MOD 30 MIN: CPT | Performed by: INTERNAL MEDICINE

## 2018-05-01 RX ORDER — CYCLOSPORINE 0.5 MG/ML
1 EMULSION OPHTHALMIC EVERY 12 HOURS
Qty: 1 BOX | Refills: 11 | Status: SHIPPED | OUTPATIENT
Start: 2018-05-01 | End: 2020-01-20

## 2018-05-01 NOTE — MR AVS SNAPSHOT
After Visit Summary   5/1/2018    Nancy Benz    MRN: [id number]           Patient Information     Date Of Birth          8/5/1922        Visit Information        Provider Department      5/1/2018 4:00 PM Flor Fraser MD [facility]        Care Instructions      Consider getting an oximeter to check you own oxygen saturations -should be 90% or higher at rest.     If low, can put on your oxygen and use the albuterol nebulizer and continue to monitor symptoms, Oxygen levels    No change recommended in the flovent dosing    Step-by-Step  Using a Nebulizer     11 steps in using a nebulizer with a face mask   Date Last Reviewed: 3/1/2017    1052-5925 [employer]. [address] [place] [address]. All rights reserved. This information is not intended as a substitute for professional medical care. Always follow your healthcare professional's instructions.        Step-by-Step  Using a Nebulizer with a Mouthpiece    Date Last Reviewed: 5/29/2015 2000-2017 [employer]. [address] [place] [address]. All rights reserved. This information is not intended as a substitute for professional medical care. Always follow your healthcare professional's instructions.                Follow-ups after your visit        Your next 10 appointments already scheduled     May 03, 2018 10:15 AM CDT   MR LUMBAR SPINE W/O CONTRAST with MGMR1   [facility] ([facility])    [address] [place] [address] [phone]           Take your medicines as usual, unless your doctor tells you not to. Bring a list of your current medicines to your exam (including vitamins, minerals and over-the-counter drugs). Also bring the results of similar scans you may have had.  Please remove any body piercings and hair extensions before you arrive.  Follow your doctor s orders. If you do not, we may have to postpone your  exam.  You may or may not receive IV contrast for this exam pending the discretion of the Radiologist.  You do not need to do anything special to prepare.  The MRI machine uses a strong magnet. Please wear clothes without metal (snaps, zippers). A sweatsuit works well, or we may give you a hospital gown.   **IMPORTANT** THE INSTRUCTIONS BELOW ARE ONLY FOR THOSE PATIENTS WHO HAVE BEEN PRESCRIBED SEDATION OR GENERAL ANESTHESIA DURING THEIR MRI PROCEDURE:  IF YOUR DOCTOR PRESCRIBED ORAL SEDATION (take medicine to help you relax during your exam):   You must get the medicine from your doctor (oral medication) before you arrive. Bring the medicine to the exam. Do not take it at home. You ll be told when to take it upon arriving for your exam.   Arrive one hour early. Bring someone who can take you home after the test. Your medicine will make you sleepy. After the exam, you may not drive, take a bus or take a taxi by yourself.  IF YOUR DOCTOR PRESCRIBED IV SEDATION:   Arrive one hour early. Bring someone who can take you home after the test. Your medicine will make you sleepy. After the exam, you may not drive, take a bus or take a taxi by yourself.   No eating 6 hours before your exam. You may have clear liquids up until 4 hours before your exam. (Clear liquids include water, clear tea, black coffee and fruit juice without pulp.)  IF YOUR DOCTOR PRESCRIBED ANESTHESIA (be asleep for your exam):   Arrive 1 1/2 hours early. Bring someone who can take you home after the test. You may not drive, take a bus or take a taxi by yourself.   No eating 8 hours before your exam. You may have clear liquids up until 4 hours before your exam. (Clear liquids include water, clear tea, black coffee and fruit juice without pulp.)   You will spend four to five hours in the recovery room.  Please call the Imaging Department at your exam site with any questions.            May 08, 2018  8:30 AM CDT   [facility]    [address] [place] [address]   [phone]            Jun 14, 2018  2:00 PM CDT   Return Visit with Sobia Terrazas MD   [facility]    [address] [place] [address] [phone]            Sep 19, 2018  9:00 AM CDT   LAB with LAB FIRST FLOOR [facility] ([facility])    [address] [place] [address]   [phone]           Please do not eat 10-12 hours before your appointment if you are coming in fasting for labs on lipids, cholesterol, or glucose (sugar). This does not apply to pregnant women. Water, hot tea and black coffee (with nothing added) are okay. Do not drink other fluids, diet soda or chew gum.            Sep 19, 2018  9:30 AM CDT   Return Visit with Candy Trujillo MD PhD   [facility]    [address] [place] [address] [phone]              Who to contact     If you have questions or need follow up information about today's clinic visit or your schedule please contact [facility] directly at [phone].  Normal or non-critical lab and imaging results will be communicated to you by MyChart, letter or phone within 4 business days after the clinic has received the results. If you do not hear from us within 7 days, please contact the clinic through [employer] or phone. If you have a critical or abnormal lab result, we will notify you by phone as soon as possible.  Submit refill requests through [employer] or call your pharmacy and they will forward the refill request to us. Please allow 3 business days for your refill to be completed.          Additional Information About Your Visit        [employer] Information     [employer] gives you secure access to your electronic health record. If you see a primary care  provider, you can also send messages to your care team and make appointments. If you have questions, please call your primary care clinic.  If you do not have a primary care provider, please call [phone] and they will assist you.      "[employer]" is an electronic gateway that provides easy, online access to your medical records. With "[employer]", you can request a clinic appointment, read your test results, renew a prescription or communicate with your care team.     To access your existing account, please contact your [facility] or call [phone] for assistance.        Care EveryWhere ID     This is your Care EveryWhere ID. This could be used by other organizations to access your [place] medical records  [id number]        Your Vitals Were     Pulse Pulse Oximetry BMI (Body Mass Index)             68 92% 25.68 kg/m2          Blood Pressure from Last 3 Encounters:   05/01/18 130/70   04/19/18 119/58   03/15/18 110/64    Weight from Last 3 Encounters:   05/01/18 55.2 kg (121 lb 12.8 oz)   04/19/18 52.6 kg (116 lb)   03/15/18 52.6 kg (116 lb)              Today, you had the following     No orders found for display       Primary Care Provider Office Phone # Fax #    Candy Trujillo MD [facility] [phone]       [address] [facility] [address]        Equal Access to Services     ASHUTOSH FRANCO : Hadii aad ku hadasho Soomaali, waaxda luqadaha, qaybta kaalmada adeyeyada, clark alfredo . So [facility] [phone].    ATENCIÓN: Si habla español, tiene a crandall disposición servicios gratuitos de asistencia lingüística. Mark al [phone].    We comply with applicable federal civil rights laws and [place] laws. We do not discriminate on the basis of race, color, national origin, age, disability, sex, sexual orientation, or gender identity.            Thank you!     Thank you for choosing [facility]  for your care. Our goal is always to provide  you with excellent care. Hearing back from our patients is one way we can continue to improve our services. Please take a few minutes to complete the written survey that you may receive in the mail after your visit with us. Thank you!             Your Updated Medication List - Protect others around you: Learn how to safely use, store and throw away your medicines at www.disposemymeds.org.          This list is accurate as of 5/1/18  4:50 PM.  Always use your most recent med list.                   Brand Name Dispense Instructions for use Diagnosis    * albuterol (2.5 MG/3ML) 0.083% neb solution      Take 1 vial by nebulization every 6 hours as needed for shortness of breath / dyspnea or wheezing        * albuterol 108 (90 Base) MCG/ACT Inhaler    PROAIR HFA/PROVENTIL HFA/VENTOLIN HFA    3 Inhaler    Inhale 2 puffs into the lungs every 4 hours as needed for shortness of breath / dyspnea or wheezing    Moderate persistent asthma with exacerbation       bimatoprost 0.01 % Soln    LUMIGAN    3 Bottle    Place 1 drop into both eyes At Bedtime    Primary open angle glaucoma of both eyes, unspecified glaucoma stage       brimonidine-timolol 0.2-0.5 % ophthalmic solution    COMBIGAN    10 mL    Place 1 drop into both eyes 2 times daily    Primary open angle glaucoma of both eyes, moderate stage       CALCIUM 600 + D 600-200 MG-UNIT Tabs   Generic drug:  Calcium Carb-Cholecalciferol      Take 1 tablet by mouth daily Vitamin P=2388 iu    Keratoconjunctivitis sicca, not specified as Sjogren's, left, Glaucoma       cycloSPORINE 0.05 % ophthalmic emulsion    RESTASIS    1 Box    Place 1 drop into both eyes every 12 hours    Keratoconjunctivitis sicca, not specified as Sjogren's, left       DILT- MG 24 hr capsule   Generic drug:  diltiazem     90 capsule    TAKE 1 CAPSULE EVERY DAY    Essential hypertension with goal blood pressure less than 140/90       doxepin 10 MG/ML (HIGH CONC) solution    SINEquan    118 mL    Take  0.3 mLs (3 mg) by mouth At Bedtime    Chronic insomnia       FLUoxetine 20 MG capsule    PROzac    90 capsule    Take 1 capsule (20 mg) by mouth daily    Depression with anxiety       fluticasone 220 MCG/ACT Inhaler    FLOVENT HFA    36 g    Inhale 2 puffs into the lungs 2 times daily    Simple chronic bronchitis (H)       gabapentin 100 MG capsule    NEURONTIN    180 capsule    Take 2 capsules (200 mg) by mouth daily (late afternoon)    Restless leg syndrome       ipratropium - albuterol 0.5 mg/2.5 mg/3 mL 0.5-2.5 (3) MG/3ML    DUONEB     Take 1 vial by nebulization every 6 hours as needed for shortness of breath / dyspnea or wheezing        LORazepam 0.5 MG tablet    ATIVAN    4 tablet    Take 0.5-1 tablets (0.25-0.5 mg) by mouth once as needed for anxiety (take 30 minutes prior to MRI) Take 30 minutes prior to departure.  Do not operate a vehicle after taking this medication    Claustrophobia       pantoprazole 20 MG EC tablet    PROTONIX    90 tablet    Take by mouth 30-60 minutes before a meal.    Peptic ulcer       pravastatin 40 MG tablet    PRAVACHOL    45 tablet    TAKE 1/2 TABLET EVERY DAY    Hyperlipidemia LDL goal <100       Respiratory Therapy Supplies Deja     1 each    Optichamber or equivalent to use with spacer.    Moderate persistent asthma without complication       senna-docusate 8.6-50 MG per tablet    SENOKOT-S;PERICOLACE     Take 1 tablet by mouth daily        SYSTANE 0.4-0.3 % Soln ophthalmic solution   Generic drug:  polyethylene glycol 0.4%- propylene glycol 0.3%      Place 1 drop into both eyes 3 times daily as needed for dry eyes    Keratoconjunctivitis sicca, not specified as Sjogren's, left, Glaucoma       traZODone 50 MG tablet    DESYREL    30 tablet    Take 0.5-1 tablets (25-50 mg) by mouth nightly as needed for sleep    Insomnia due to medical condition       * Notice:  This list has 2 medication(s) that are the same as other medications prescribed for you. Read the directions  carefully, and ask your doctor or other care provider to review them with you.

## 2018-05-01 NOTE — PATIENT INSTRUCTIONS
Consider getting an oximeter to check you own oxygen saturations -should be 90% or higher at rest.     If low, can put on your oxygen and use the albuterol nebulizer and continue to monitor symptoms, Oxygen levels    No change recommended in the flovent dosing    Step-by-Step  Using a Nebulizer     11 steps in using a nebulizer with a face mask   Date Last Reviewed: 3/1/2017    3606-1963 [employer]. [address] [place] [address]. All rights reserved. This information is not intended as a substitute for professional medical care. Always follow your healthcare professional's instructions.        Step-by-Step  Using a Nebulizer with a Mouthpiece    Date Last Reviewed: 5/29/2015 2000-2017 [employer]. [address] [place] [address]. All rights reserved. This information is not intended as a substitute for professional medical care. Always follow your healthcare professional's instructions.

## 2018-05-01 NOTE — PROGRESS NOTES
Chief complaint: Consultaion requested by Dr. Candy Trujillo for the evaluation of asthma    History of Present Illness: A 89-year-old female here with her daughter.  They describe that the patient is diagnosed with adult onset asthma a few years back.  She has been on supplemental oxygen with sleep for years.  She thinks that started around the time of a pelvis fracture.  Apparently there is been oximetry done as recently as 2013 but that is not available.  She does feel that she eventually recovered completely from her symptoms in December.  She associates her asthma with upper respiratory tract infections.  Typically gets a severe cough and wheezing.  On a day-to-day basis when she is doing well she does not have any symptoms.  She does use inhaled fluticasone twice daily.  Has albuterol available but has not needed it since December.  She denies any symptoms of PND or orthopnea.  No fevers chills sweats or night sweats or chest pains.  She lives in senior apartments independent living.  Lives with her .  Moved down here from [place] a couple years ago to be closer to family.  She is a lifelong non-smoker.  She does not have any pets.  He does try to exercise regularly, she uses a new step up to 30 minutes a day multiple days a week.  She has some mild edema that develops towards the ends of the day.  She and her daughter state that they would like to know what to do if her symptoms return.    Recent Hospitalizations: Dec 6th 2017 asthma, hypoxemia, Heart failure with preserved ejection fraction, viral URI  Dec 17 related to overdose, complicated by PE/DVT, then acute blood loss anemia and hemothorax (presumed) related to anticoagulation  Jan 5, 2018 Hyponatremia      Past Medical History:   Diagnosis Date     Arthritis      Bilateral presbyopia      Cataract 2001/2007     COPD (chronic obstructive pulmonary disease) (H)      Depressive disorder      Essential hypertension 9/11/2017     Monoclonal gammopathy       Nonrheumatic aortic valve insufficiency      Open-angle glaucoma 1969    both eyes     Uncomplicated asthma        Allergies   Allergen Reactions     Hydrocodone-Acetaminophen Other (See Comments)     Nausea, dizzy, felt awful     Pilocarpine      Timolol Difficulty breathing     Makes asthma worse    Oral tabs.. ophth sol does not cause a problem       Current Outpatient Prescriptions   Medication     albuterol (2.5 MG/3ML) 0.083% neb solution     albuterol (PROAIR HFA/PROVENTIL HFA/VENTOLIN HFA) 108 (90 BASE) MCG/ACT Inhaler     bimatoprost (LUMIGAN) 0.01 % SOLN     brimonidine-timolol (COMBIGAN) 0.2-0.5 % ophthalmic solution     Calcium Carb-Cholecalciferol (CALCIUM 600 + D) 600-200 MG-UNIT TABS     cycloSPORINE (RESTASIS) 0.05 % ophthalmic emulsion     DILT- MG 24 hr capsule     doxepin (SINEQUAN) 10 MG/ML (HIGH CONC) solution     FLUoxetine (PROZAC) 20 MG capsule     fluticasone (FLOVENT HFA) 220 MCG/ACT Inhaler     gabapentin (NEURONTIN) 100 MG capsule     ipratropium - albuterol 0.5 mg/2.5 mg/3 mL (DUONEB) 0.5-2.5 (3) MG/3ML     LORazepam (ATIVAN) 0.5 MG tablet     pantoprazole (PROTONIX) 20 MG EC tablet     polyethylene glycol 0.4%- propylene glycol 0.3% (SYSTANE) 0.4-0.3 % SOLN ophthalmic solution     pravastatin (PRAVACHOL) 40 MG tablet     Respiratory Therapy Supplies ROBERTO     senna-docusate (SENOKOT-S;PERICOLACE) 8.6-50 MG per tablet     traZODone (DESYREL) 50 MG tablet     No current facility-administered medications for this visit.        Social History     Social History     Marital status:      Spouse name: N/A     Number of children: N/A     Years of education: N/A     Occupational History     Not on file.     Social History Main Topics     Smoking status: Never Smoker     Smokeless tobacco: Never Used     Alcohol use Yes     Drug use: No     Sexual activity: Not on file     Other Topics Concern     Parent/Sibling W/ Cabg, Mi Or Angioplasty Before 65f 55m? No     Social History  Narrative       Family History   Problem Relation Age of Onset     Glaucoma Son      Glaucoma Daughter        Review of Systems: Positve per HPI, otherwise comprehensive review of systems is negative.    EXAM: Pleasant alert older female no distress  /70 (BP Location: Left arm, Patient Position: Chair, Cuff Size: Adult Regular)  Pulse 68  Wt 55.2 kg (121 lb 12.8 oz)  SpO2 92%  BMI 25.68 kg/m2  HEENT: Normocephalic/atraumatic, pupils are equal,  no scleral icterus  Oropharynx: No evidence for thrush  Neck supple no lymphadenopathy  Chest: Clear to auscultation bilaterally, no rales or wheezes  Cardiac: Regular rate and rhythm, S1-S2 stephon systolic murmur present in the right upper sternal border  Abdomen: Obese, soft and nontender, bowel sounds present  Extremities: Warm, well perfused, no cyanosis clubbing , there is trace bilateral lower extremity edema  Psychiatric: Mood and affect appear normal  Neurologic: No gross focal deficits    Pulmonary function testing performed on 4/26/2018  Forced vital capacity 1.42  FEV1 1.02  Ratio is 72-ratio is normal there are no predicted's available for her age her for FEV1 or FVC  Total lung capacity 3.26 which is 75% predicted  Diffusion capacity 63% predicted  The above is likely normal for age  Notably there is no significant change from testing in August 2016    Imaging personally reviewed, agree with radiology read:    CT chest [facility] 12/18/2017  IMPRESSION:   1.  Left upper lobe pulmonary embolus. The main pulmonary arteries are dilated suggesting some underlying pulmonary hypertension.  2.  4 mm right upper lobe noncalcified pulmonary nodule. Six-month follow-up is recommended.  3.  Subsegmental atelectasis and pleural-parenchymal scarring in the left lung base  4.  Old granulomatous disease    CT chest [facility] 12/26/2018  6 cm pleural opacity in the apical part of the right upper lobe, new compared to 12/18/2017, with attenuation of 50-60  Hounsfield units suggesting hemothorax, presumably at least partially loculated given its localization superiorly in the chest. Elsewhere, dependently within the right pleural space moderate right pleural effusion with attenuation of 25-30 Hounsfield units, suspected also to reflect presence of hemothorax. Associated compressive atelectasis in the right lung. The right lung was better aerated on 12/18/2017. A couple of 4 mm pulmonary nodules are present, both highly unlikely to be of any significance; considering patient age, it is doubtful these warrant any imaging follow-up.    CXR 1/5/2018 (not available for personal review)  IMPRESSION:   1.  Resolution of the nonloculated right effusion/hemothorax.  2.  Loculated probable hemothorax in the right apex, unchanged.  3.  No new acute findings    ASSESSMENT: 89-year-old female with complex cardiopulmonary disease, history of adult onset asthma, PE, hemothorax.  Pulmonary function testing does not show chronic obstruction, and is unchanged from two years ago.  May be challenging to sort out cardiac pulmonary symptoms in an acute setting.  Currently she is doing well on a reasonable inhaled regimen.    PLAN: Agree with her current regimen.  Urged her to consider getting an oximeter for personal use.  If she is having any respiratory complaints she can check the oximeter and if her sats are less than 90% consider using her home oxygen.  She could then try an albuterol nebulizer see if her sats improve.  Urged her to communicate closely with family and nurse regarding symptoms develop.  Patient is encouraged to practice frequent handwashing, stay up-to-date on immunizations and other measures to avoid infection.  No further recommendations at this time, then follow-up in pulmonary on an as-needed basis.    Flor Fraser M.D.  Pulmonary/Critical Care/Sleep Medicine    The above note was dictated using voice recognition software and may include typographical errors.  Please contact the author for any clarifications.

## 2018-05-01 NOTE — NURSING NOTE
"Nancy Benz's goals for this visit include: COPD  She requests these members of her care team be copied on today's visit information: yes    PCP: Candy Trujillo    Referring Provider:  No referring provider defined for this encounter.    Chief Complaint   Patient presents with     COPD       Initial /70 (BP Location: Left arm, Patient Position: Chair, Cuff Size: Adult Regular)  Pulse 68  Wt 55.2 kg (121 lb 12.8 oz)  SpO2 92%  BMI 25.68 kg/m2 Estimated body mass index is 25.68 kg/(m^2) as calculated from the following:    Height as of 4/19/18: 1.467 m (4' 9.75\").    Weight as of this encounter: 55.2 kg (121 lb 12.8 oz).  Medication Reconciliation: complete    Do you need any medication refills at today's visit? no    "

## 2018-05-01 NOTE — TELEPHONE ENCOUNTER
Provider: Amy    Faxed refill request from: Pt    Medication request: Restasis  Prescription written: 12/2016  Last filled: ?  Last Qty: 2 trays    Pt's last office visit: Oct 2018  Next scheduled office visit: 5/8/18  Ok to fill. Order ok'd to fill for 90 day supply when indicated.  Janeth NEGRON. OSC

## 2018-05-03 ENCOUNTER — RADIANT APPOINTMENT (OUTPATIENT)
Dept: MRI IMAGING | Facility: CLINIC | Age: 83
End: 2018-05-03
Attending: PREVENTIVE MEDICINE
Payer: COMMERCIAL

## 2018-05-03 DIAGNOSIS — M54.50 CHRONIC BILATERAL LOW BACK PAIN WITHOUT SCIATICA: ICD-10-CM

## 2018-05-03 DIAGNOSIS — M48.00 SPINAL STENOSIS, UNSPECIFIED SPINAL REGION: ICD-10-CM

## 2018-05-03 DIAGNOSIS — G89.29 CHRONIC BILATERAL LOW BACK PAIN WITHOUT SCIATICA: ICD-10-CM

## 2018-05-03 PROCEDURE — 72148 MRI LUMBAR SPINE W/O DYE: CPT | Performed by: RADIOLOGY

## 2018-05-08 ENCOUNTER — OFFICE VISIT (OUTPATIENT)
Dept: OPHTHALMOLOGY | Facility: CLINIC | Age: 83
End: 2018-05-08
Payer: COMMERCIAL

## 2018-05-08 DIAGNOSIS — H16.222: ICD-10-CM

## 2018-05-08 DIAGNOSIS — H40.1130 PRIMARY OPEN ANGLE GLAUCOMA OF BOTH EYES, UNSPECIFIED GLAUCOMA STAGE: Primary | ICD-10-CM

## 2018-05-08 DIAGNOSIS — H01.119 CONTACT AND ALLERGIC DERMATITIS OF EYELID, UNSPECIFIED LATERALITY: ICD-10-CM

## 2018-05-08 DIAGNOSIS — H54.40 BLIND RIGHT EYE: ICD-10-CM

## 2018-05-08 DIAGNOSIS — H35.30 AMD (AGE-RELATED MACULAR DEGENERATION), BILATERAL: ICD-10-CM

## 2018-05-08 DIAGNOSIS — Z96.1 PSEUDOPHAKIA: ICD-10-CM

## 2018-05-08 PROCEDURE — 92133 CPTRZD OPH DX IMG PST SGM ON: CPT | Performed by: OPHTHALMOLOGY

## 2018-05-08 PROCEDURE — 92014 COMPRE OPH EXAM EST PT 1/>: CPT | Performed by: OPHTHALMOLOGY

## 2018-05-08 PROCEDURE — 92015 DETERMINE REFRACTIVE STATE: CPT | Performed by: OPHTHALMOLOGY

## 2018-05-08 ASSESSMENT — TONOMETRY
OD_IOP_MMHG: 17
OS_IOP_MMHG: 13
IOP_METHOD: ICARE

## 2018-05-08 ASSESSMENT — CONF VISUAL FIELD
OD_SUPERIOR_TEMPORAL_RESTRICTION: 3
OS_SUPERIOR_TEMPORAL_RESTRICTION: 3
OS_SUPERIOR_NASAL_RESTRICTION: 3
OD_SUPERIOR_NASAL_RESTRICTION: 3

## 2018-05-08 ASSESSMENT — VISUAL ACUITY
METHOD: SNELLEN - LINEAR
OS_PH_SC: 20/50
OD_SC: CF 5FT
OS_CC: 20/80

## 2018-05-08 ASSESSMENT — REFRACTION_WEARINGRX
OD_ADD: +3.00
OD_CYLINDER: +1.00
OS_CYLINDER: +1.00
OS_SPHERE: -1.00
OD_SPHERE: -1.25
SPECS_TYPE: PAL
OS_AXIS: 179
OD_AXIS: 025
OS_ADD: +3.00

## 2018-05-08 ASSESSMENT — EXTERNAL EXAM - LEFT EYE: OS_EXAM: S/P PTOSIS REPAIR

## 2018-05-08 ASSESSMENT — CUP TO DISC RATIO
OS_RATIO: 0.6
OD_RATIO: 0.7

## 2018-05-08 ASSESSMENT — REFRACTION_MANIFEST
OS_ADD: +3.00
OS_AXIS: 011
OS_CYLINDER: +2.00
OS_SPHERE: -1.50
OD_SPHERE: BALANCE

## 2018-05-08 ASSESSMENT — EXTERNAL EXAM - RIGHT EYE: OD_EXAM: S/P PTOSIS REPAIR

## 2018-05-08 ASSESSMENT — PACHYMETRY
OS_CT(UM): 540
OD_CT(UM): 514

## 2018-05-08 NOTE — PROGRESS NOTES
Assessment & Plan   Nancy Benz is a 89 year old female who presents with:   Review of systems for the eyes was negative other than the pertinent positives and negatives noted in the HPI.  History is obtained from the patient and .     Chief Complaint   Patient presents with     COMPREHENSIVE EYE EXAM     Primary Open Angle Glaucoma Follow Up     6 mo for dfe, oct, iop (no hvf)         Primary open angle glaucoma of both eyes, unspecified glaucoma stage  - OCT Optic Nerve RNFL Optovue OU (both eyes)    Keratoconjunctivitis sicca, not specified as Sjogren's, left  - CPM w/ Restasis and ATs  - Attempted BCL in left eye, unable to insert       Pseudophakia bilateral  - Open capsule left eye  - PCO right eye  - REFRACTION - no improvement in vision    Blind right eye  - Referral to Low vision    Contact and allergic dermatitis of eyelid, unspecified laterality  - LH/WC    Return in 6 mo for pressure check    Documentation for today's encounter was performed by Janeth Carlisle COA. OSC. Acting as a scribe in my presence. I have reviewed and verified that it is an accurate recording of today's encounter.    Attending Physician Attestation:  Complete documentation of historical and exam elements from today's encounter can be found in the full encounter summary report (not reduplicated in this progress note).  I personally obtained the chief complaint(s) and history of present illness.  I confirmed and edited as necessary the review of systems, past medical/surgical history, family history, social history, and examination findings as documented by others; and I examined the patient myself.  I personally reviewed the relevant tests, images, and reports as documented above.  I formulated and edited as necessary the assessment and plan and discussed the findings and management plan with the patient and family. - Lee Reyes MD

## 2018-05-08 NOTE — NURSING NOTE
Patient presents with:  COMPREHENSIVE EYE EXAM  Primary Open Angle Glaucoma Follow Up: 6 mo for dfe, oct, iop (no hvf)      Referring Provider:  No referring provider defined for this encounter.    HPI    Last Eye Exam:  4/10/17   Informant(s):  pt   Symptoms:     Decreased vision   Foreign body sensation   Dryness   Burning      Duration:  1 year         Comments:  Eyes very dry and uncomfortable using systane ultra at least every 15 - 30 minutes - sometimes more  genteal marisol qhs  Restatis bid ou  lumigan qhs OU combigan bid OU last gtt at 7am             Unable to obtain OCT images OS due to cornea issues     Paloma Humphries, COA

## 2018-05-08 NOTE — MR AVS SNAPSHOT
After Visit Summary   5/8/2018    Nancy Benz    MRN: [id number]           Patient Information     Date Of Birth          8/5/1922        Visit Information        Provider Department      5/8/2018 8:30 AM Lee Reyes MD;  OPHTH NURSE ONLY [facility]        Today's Diagnoses     Primary open angle glaucoma of both eyes, unspecified glaucoma stage    -  1    Keratoconjunctivitis sicca, not specified as Sjogren's, left        Pseudophakia        Blind right eye        Contact and allergic dermatitis of eyelid, unspecified laterality        AMD (age-related macular degeneration), bilateral          Care Instructions    Good Lid Hygeine  To treat the problem, you need to keep your eyelids clean. Warm compresses help reduce redness and swelling and keep the eyelids clean. You may also need to clean your eyelids when you wake up.    To apply a warm compress:  1. Wash your hands with soap and warm water.  2. Wet a clean washcloth with warm water. Then wring it out.  3. Close your eyes and place the washcloth over your eyelids for 3-5 minutes. This helps loosen scales or crusts.  4. Wet the washcloth again as often as needed to keep it warm.  Repeat 2 or more times a day. Use a clean washcloth each time.    To use an eyelid scrub:  1. Wash your hands with soap and warm water.  2. Use a ready-made eyelid scrub. Or, mix 3 drops of baby shampoo in 1/4 cup of warm water.  3. Dip a lint-free pad, cotton swab, or clean washcloth in the scrub.  4. Close one eye and gently scrub the base of the eyelid.  5. Rinse the lid in cool water and dry with a clean towel.  6. Repeat on the other eye.            Follow-ups after your visit        Additional Services     OCCUPATIONAL THERAPY REFERRAL       *This therapy referral will be filtered to a centralized scheduling office at [facility] and the patient will receive a call to schedule an appointment at a [place]  "location most convenient for them. *     [facility] provides Occupational Therapy evaluation and treatment and many specialty services across the [facility] system.  If requesting a specialty program, please choose from the list below.    If you have not heard from the scheduling office within 2 business days, please call [phone] for all locations, with the exception of [place], please call [phone] and [place], please call [phone]    Treatment: Evaluation & Treatment  Special Instructions/Modalities: decreased vision left eye  Special Programs: Low Vision    Please be aware that coverage of these services is subject to the terms and limitations of your health insurance plan.  Call member services at your health plan with any benefit or coverage questions.      **Note to Provider:  If you are referring outside of [facility] for the therapy appointment, please list the name of the location in the \"special instructions\" above, print the referral and give to the patient to schedule the appointment.                  Follow-up notes from your care team     Return in about 6 months (around 11/8/2018) for Glaucoma check, Pressure check.      Your next 10 appointments already scheduled     Jun 14, 2018  2:00 PM CDT   Return Visit with Sobia Terrazas MD   [facility]    [id number] [place] [address] [id number] [phone]            Sep 19, 2018  9:00 AM CDT   LAB with LAB FIRST FLOOR [facility] ([facility])    [id number] [place] [id number] [phone]           Please do not eat 10-12 hours before your appointment if you are coming in fasting for labs on lipids, cholesterol, or glucose (sugar). This does not apply to pregnant women. Water, hot tea and black coffee (with nothing added) are okay. Do not drink other fluids, diet soda or chew gum.            Sep 19, 2018  9:30 " AM CDT   Return Visit with Candy Trujillo MD PhD   [facility]    [address] [place] [id number] [phone]              Who to contact     If you have questions or need follow up information about today's clinic visit or your schedule please contact [facility] directly at [phone].  Normal or non-critical lab and imaging results will be communicated to you by [employer], letter or phone within 4 business days after the clinic has received the results. If you do not hear from us within 7 days, please contact the clinic through [employer] or phone. If you have a critical or abnormal lab result, we will notify you by phone as soon as possible.  Submit refill requests through [employer] or call your pharmacy and they will forward the refill request to us. Please allow 3 business days for your refill to be completed.          Additional Information About Your Visit        [email or web address] Information     [employer] gives you secure access to your electronic health record. If you see a primary care provider, you can also send messages to your care team and make appointments. If you have questions, please call your primary care clinic.  If you do not have a primary care provider, please call [phone] and they will assist you.      [employer] is an electronic gateway that provides easy, online access to your medical records. With [employer], you can request a clinic appointment, read your test results, renew a prescription or communicate with your care team.     To access your existing account, please contact your [facility] or call [phone] for assistance.        Care EveryWhere ID     This is your Care EveryWhere ID. This could be used by other organizations to access your [place] medical records  [id number]         Blood Pressure from Last 3 Encounters:   05/01/18 130/70   04/19/18 119/58   03/15/18 110/64    Weight from  Last 3 Encounters:   05/01/18 55.2 kg (121 lb 12.8 oz)   04/19/18 52.6 kg (116 lb)   03/15/18 52.6 kg (116 lb)              We Performed the Following     EYE EXAM, EST PATIENT,COMPREHESV     OCCUPATIONAL THERAPY REFERRAL     OCT Optic Nerve RNFL Optovue OU (both eyes)     REFRACTION     SD-OCT Macula Optovue OU (both eyes)        Primary Care Provider Office Phone # Fax #    Candy Trujillo MD PhD [phone] [address]  [facility] [address]        Equal Access to Services     [facility]: Hadii aad ku hadasho Soomaali, waaxda luqadaha, qaybta kaalmada adeegyada, clark alfredo . So [facility] [phone].    ATENCIÓN: Si habla español, tiene a crandall disposición servicios gratuitos de asistencia lingüística. LlSelect [facility] [phone].    We comply with applicable federal civil rights laws and [place] laws. We do not discriminate on the basis of race, color, national origin, age, disability, sex, sexual orientation, or gender identity.            Thank you!     Thank you for choosing [facility]  for your care. Our goal is always to provide you with excellent care. Hearing back from our patients is one way we can continue to improve our services. Please take a few minutes to complete the written survey that you may receive in the mail after your visit with us. Thank you!             Your Updated Medication List - Protect others around you: Learn how to safely use, store and throw away your medicines at www.disposemymeds.org.          This list is accurate as of 5/8/18 10:11 AM.  Always use your most recent med list.                   Brand Name Dispense Instructions for use Diagnosis    * albuterol (2.5 MG/3ML) 0.083% neb solution      Take 1 vial by nebulization every 6 hours as needed for shortness of breath / dyspnea or wheezing        * albuterol 108 (90 Base) MCG/ACT Inhaler    PROAIR HFA/PROVENTIL HFA/VENTOLIN HFA    3 Inhaler    Inhale 2 puffs into the  lungs every 4 hours as needed for shortness of breath / dyspnea or wheezing    Moderate persistent asthma with exacerbation       bimatoprost 0.01 % Soln    LUMIGAN    3 Bottle    Place 1 drop into both eyes At Bedtime    Primary open angle glaucoma of both eyes, unspecified glaucoma stage       brimonidine-timolol 0.2-0.5 % ophthalmic solution    COMBIGAN    10 mL    Place 1 drop into both eyes 2 times daily    Primary open angle glaucoma of both eyes, moderate stage       CALCIUM 600 + D 600-200 MG-UNIT Tabs   Generic drug:  Calcium Carb-Cholecalciferol      Take 1 tablet by mouth daily Vitamin U=9643 iu    Keratoconjunctivitis sicca, not specified as Sjogren's, left, Glaucoma       cycloSPORINE 0.05 % ophthalmic emulsion    RESTASIS    1 Box    Place 1 drop into both eyes every 12 hours    Keratoconjunctivitis sicca, not specified as Sjogren's, left       DILT- MG 24 hr capsule   Generic drug:  diltiazem     90 capsule    TAKE 1 CAPSULE EVERY DAY    Essential hypertension with goal blood pressure less than 140/90       doxepin 10 MG/ML (HIGH CONC) solution    SINEquan    118 mL    Take 0.3 mLs (3 mg) by mouth At Bedtime    Chronic insomnia       FLUoxetine 20 MG capsule    PROzac    90 capsule    Take 1 capsule (20 mg) by mouth daily    Depression with anxiety       fluticasone 220 MCG/ACT Inhaler    FLOVENT HFA    36 g    Inhale 2 puffs into the lungs 2 times daily    Simple chronic bronchitis (H)       gabapentin 100 MG capsule    NEURONTIN    180 capsule    Take 2 capsules (200 mg) by mouth daily (late afternoon)    Restless leg syndrome       ipratropium - albuterol 0.5 mg/2.5 mg/3 mL 0.5-2.5 (3) MG/3ML    DUONEB     Take 1 vial by nebulization every 6 hours as needed for shortness of breath / dyspnea or wheezing        LORazepam 0.5 MG tablet    ATIVAN    4 tablet    Take 0.5-1 tablets (0.25-0.5 mg) by mouth once as needed for anxiety (take 30 minutes prior to MRI) Take 30 minutes prior to departure.   Do not operate a vehicle after taking this medication    Claustrophobia       pantoprazole 20 MG EC tablet    PROTONIX    90 tablet    Take by mouth 30-60 minutes before a meal.    Peptic ulcer       pravastatin 40 MG tablet    PRAVACHOL    45 tablet    TAKE 1/2 TABLET EVERY DAY    Hyperlipidemia LDL goal <100       Respiratory Therapy Supplies Deja     1 each    Optichamber or equivalent to use with spacer.    Moderate persistent asthma without complication       senna-docusate 8.6-50 MG per tablet    SENOKOT-S;PERICOLACE     Take 1 tablet by mouth daily        SYSTANE 0.4-0.3 % Soln ophthalmic solution   Generic drug:  polyethylene glycol 0.4%- propylene glycol 0.3%      Place 1 drop into both eyes 3 times daily as needed for dry eyes    Keratoconjunctivitis sicca, not specified as Sjogren's, left, Glaucoma       traZODone 50 MG tablet    DESYREL    30 tablet    Take 0.5-1 tablets (25-50 mg) by mouth nightly as needed for sleep    Insomnia due to medical condition       * Notice:  This list has 2 medication(s) that are the same as other medications prescribed for you. Read the directions carefully, and ask your doctor or other care provider to review them with you.

## 2018-05-08 NOTE — PATIENT INSTRUCTIONS
Good Lid Hygeine  To treat the problem, you need to keep your eyelids clean. Warm compresses help reduce redness and swelling and keep the eyelids clean. You may also need to clean your eyelids when you wake up.    To apply a warm compress:  1. Wash your hands with soap and warm water.  2. Wet a clean washcloth with warm water. Then wring it out.  3. Close your eyes and place the washcloth over your eyelids for 3 5 minutes. This helps loosen scales or crusts.  4. Wet the washcloth again as often as needed to keep it warm.  Repeat 2 or more times a day. Use a clean washcloth each time.    To use an eyelid scrub:  1. Wash your hands with soap and warm water.  2. Use a ready-made eyelid scrub. Or, mix 3 drops of baby shampoo in 1/4 cup of warm water.  3. Dip a lint-free pad, cotton swab, or clean washcloth in the scrub.  4. Close one eye and gently scrub the base of the eyelid.  5. Rinse the lid in cool water and dry with a clean towel.  6. Repeat on the other eye.

## 2018-05-15 ENCOUNTER — MYC MEDICAL ADVICE (OUTPATIENT)
Dept: PEDIATRICS | Facility: CLINIC | Age: 83
End: 2018-05-15

## 2018-05-15 DIAGNOSIS — M51.369 LUMBAR DEGENERATIVE DISC DISEASE: Primary | ICD-10-CM

## 2018-05-16 NOTE — TELEPHONE ENCOUNTER
Routing to provider to please review and advise.     doxepin (SINEQUAN) 10 MG/ML (HIGH CONC) solution 118 mL 0 2/5/2018  No   Sig: Take 0.3 mLs (3 mg) by mouth At Bedtime       Evelin Esqueda RN

## 2018-05-29 ENCOUNTER — RADIANT APPOINTMENT (OUTPATIENT)
Dept: GENERAL RADIOLOGY | Facility: CLINIC | Age: 83
End: 2018-05-29
Attending: PREVENTIVE MEDICINE
Payer: COMMERCIAL

## 2018-05-29 ENCOUNTER — HOSPITAL ENCOUNTER (OUTPATIENT)
Dept: OCCUPATIONAL THERAPY | Facility: CLINIC | Age: 83
Setting detail: THERAPIES SERIES
End: 2018-05-29
Attending: OPHTHALMOLOGY
Payer: MEDICARE

## 2018-05-29 PROCEDURE — 97535 SELF CARE MNGMENT TRAINING: CPT | Mod: GO | Performed by: OCCUPATIONAL THERAPIST

## 2018-05-29 PROCEDURE — G8987 SELF CARE CURRENT STATUS: HCPCS | Mod: CK,GO | Performed by: OCCUPATIONAL THERAPIST

## 2018-05-29 PROCEDURE — 62323 NJX INTERLAMINAR LMBR/SAC: CPT | Performed by: RADIOLOGY

## 2018-05-29 PROCEDURE — 40000249 ZZH STATISTIC VISIT LOW VISION CLINIC: Performed by: OCCUPATIONAL THERAPIST

## 2018-05-29 PROCEDURE — G8988 SELF CARE GOAL STATUS: HCPCS | Mod: CJ | Performed by: OCCUPATIONAL THERAPIST

## 2018-05-29 PROCEDURE — 97165 OT EVAL LOW COMPLEX 30 MIN: CPT | Mod: GO | Performed by: OCCUPATIONAL THERAPIST

## 2018-05-29 RX ORDER — LIDOCAINE HYDROCHLORIDE 10 MG/ML
5 INJECTION, SOLUTION EPIDURAL; INFILTRATION; INTRACAUDAL; PERINEURAL ONCE
Status: COMPLETED | OUTPATIENT
Start: 2018-05-29 | End: 2018-05-29

## 2018-05-29 RX ORDER — METHYLPREDNISOLONE ACETATE 80 MG/ML
80 INJECTION, SUSPENSION INTRA-ARTICULAR; INTRALESIONAL; INTRAMUSCULAR; SOFT TISSUE ONCE
Status: COMPLETED | OUTPATIENT
Start: 2018-05-29 | End: 2018-05-29

## 2018-05-29 RX ORDER — BUPIVACAINE HYDROCHLORIDE 5 MG/ML
3 INJECTION, SOLUTION PERINEURAL ONCE
Status: COMPLETED | OUTPATIENT
Start: 2018-05-29 | End: 2018-05-29

## 2018-05-29 RX ORDER — IOPAMIDOL 408 MG/ML
10 INJECTION, SOLUTION INTRATHECAL ONCE
Status: COMPLETED | OUTPATIENT
Start: 2018-05-29 | End: 2018-05-29

## 2018-05-29 RX ADMIN — LIDOCAINE HYDROCHLORIDE 5 ML: 10 INJECTION, SOLUTION EPIDURAL; INFILTRATION; INTRACAUDAL; PERINEURAL at 09:41

## 2018-05-29 RX ADMIN — BUPIVACAINE HYDROCHLORIDE 15 MG: 5 INJECTION, SOLUTION PERINEURAL at 09:41

## 2018-05-29 RX ADMIN — METHYLPREDNISOLONE ACETATE 80 MG: 80 INJECTION, SUSPENSION INTRA-ARTICULAR; INTRALESIONAL; INTRAMUSCULAR; SOFT TISSUE at 09:42

## 2018-05-29 RX ADMIN — IOPAMIDOL 2 ML: 408 INJECTION, SOLUTION INTRATHECAL at 09:41

## 2018-05-29 ASSESSMENT — VISUAL ACUITY: OS: 20/80

## 2018-05-29 ASSESSMENT — ACTIVITIES OF DAILY LIVING (ADL)
ADL_EQUIPMENT_USED: GRAB BAR;RAISED TOILET SEAT;TUB/SHOWER CHAIR
RETIRED_PRIOR_ADL/IADL_STATUS: IMPAIRED PRIOR TO ONSET
PRIOR_ADL/IADL_STATUS: IMPAIRED PRIOR TO ONSET
RETIRED_ADL_EQUIPMENT_USED: GRAB BAR;RAISED TOILET SEAT;TUB/SHOWER CHAIR

## 2018-05-29 NOTE — PROGRESS NOTES
[facility]          OUTPATIENT OCCUPATIONALTHERAPY  EVALUATION  PLAN OF TREATMENT FOR OUTPATIENT REHABILITATION  (COMPLETE FOR INITIAL CLAIMS ONLY)  Patient's Last Name, First Name, M.I.  YOB: 1922  Nancy Benz      Provider's Name  [facility] Medical Record No.  [id number]   Onset Date:  05/08/2018 Start of Care Date:  05/29/18   Type:     ___PT  _X_OT   ___SLP Medical Diagnosis:  glaucoma, dry eye, question strabismus    Therapy Diagnosis: Impaired ADL/IADL with deficits in Reading based ADL, Written communication, Home management (orientation, calendar management. management light/glare)    Visits from SOC: 1     _________________________________________________________________________________  Plan of Treatment/Functional Goals:  Planned Interventions: Low vision compensatory training for reading, Low vision compensatory training for written communication, Instruction in environmental adaptations for glare, Instruction in environmental adaptations for lighting, Optical device/ADL device instruction and training, Instruction in community resources (management light/glare/dry eye)        Goals  1. Patient will verbalize awareness of visual field Loss and demonstrate improved use of visual skills/adaptive equipment for increased independence in reading-based activities of daily living, written communication and detail ADL tasks.         Target Date: 08/21/18      2.                  3.  Patient will demonstrate understanding of the impact of lighting, contrast and glare on ADL activities, in conjunction with environmentally-based ADL modifications         Target Date: 08/21/18      4. Patient will verbalize awareness of community resources for the following:, Audio access to print materials, Access to large print materials, Access to low vision devices         Target  Date: 08/21/18      5.                   6.                    7.                 8.                            Therapy Frequency/Duration:  4 tx visits over 12 weeks - 1X every 3 weeks for 12 weeks    Mary Chew, OT       I CERTIFY THE NEED FOR THESE SERVICES FURNISHED UNDER        THIS PLAN OF TREATMENT AND WHILE UNDER MY CARE     (Physician co-signature of this document indicates review and certification of the therapy plan).                  Certification date from: 05/29/18 Certification date to: 08/21/18          Referring Physician: MD Amy     Initial Assessment        See Epic Evaluation Start Of Care Date: 05/29/18     Mary Chew

## 2018-05-29 NOTE — PROGRESS NOTES
: Nancy was seen in X-ray today for a lumbar epidural injection. Patient rated pain before procedure 0/10. After procedure patient rated pain 0/10. This pain level is (is/is not) acceptable to patient. Patient discharged home with Daughter.       AFTER YOU GO HOME    ? DO relax; minimize your activity for 24 hours  ? You may resume normal activity tomorrow  ? You may remove the bandage in the evening or next morning  ? You may resume bathing the next day  ? Drink at least 4 extra glasses of fluid today if not on fluid restrictions  ? DO NOT drive or operate machinery at home or at work for at least 24 hours      VISIT THE EMERGENCY ROOM OR URGENT CARE IF:    ? There is redness or swelling at the injection site  ? There is discharge from the injection site  ? You develop a temperature of 101  F or greater      ADDITIONAL INSTRUCTIONS:     ? You may resume your Coumadin or other blood thinner at your regular dose today.  Follow up with your physician to have your INR rechecked if indicated.  ? If you gain no relief from the injection after two (2) weeks, follow-up with your provider for your options.        Contacts:    During business hours from 8 to 5 pm, you may call [phone] to reach a nurse advisor at [facility].  After hours, call [facility]  [phone].  Ask for the Radiologist on-call.  Someone is on-call 24 hrs/day.  [facility] Toll Free Number   [phone]

## 2018-05-29 NOTE — PROGRESS NOTES
05/29/18 1000   Visit Type   Type of Visit Initial       Present No   General Information   Start Of Care Date 05/29/18   Referring Physician MD Amy   Orders Evaluate And Treat As Indicated   Date of Order 05/08/18   Medical Diagnosis glaucoma, keratoconjunctivitis   Onset Of Illness/injury Or Date Of Surgery 05/08/2018   Surgical/Medical history reviewed (dizzy all the time - has been examined)   Precautions/Limitations back pain (chronic), asthma, 3 recent hospitalizations   Prior ADL/IADL status Impaired prior to onset   Prior Status Comment pt's dtr reports recent change in short term memory following 3 recent hospitalizatons.    Others present at visit Child (julius)  (Dtr Kady)   Patient/family Goals Statement reading, orientation, calendar management,   Social History/Home Environment   Living Environment Apartment/condo  (lives with , independent living)   Current Community Support (dtr. 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 No   Have you fallen and had an injury in the past year? No   Is patient a fall risk? No   Fall screen comments uses Walker well.     Cognitive/Behavioral   Communication Intact   Cognitive Status Impaired  (recent memory changes - day of week, activities)   Behavior Appropriate   Patient/family aware of diagnosis Yes   How well do you understand your eye condition? Well   Vision related restrictions on visiting with family/friends Mild   Reported emotional impact of visual impairment Severe  (avid reader - very limited reading secondary to dry eyes)   Adjustment to disability Good   Cognitive/Behavioral Comment pt's dtr. reports recent change to short term memory. Pt reports recent challenges to  "orientation.  Relies on  to keep track of schedule/calendar   Physical Status/Equipment   Physical Status Weakness;Impaired balance   Mobility equipment used Walker   ADL Equipment used Grab bar;Raised toilet seat;Tub/shower chair   Visual Report   Functional Complaints Reading;Writing;Homemaking  (orientation, calendar management, light and glare managment)   Visual Complaints Constricted visual fields right eye;Visual fatigue;Difficulty maintaining focus;Double vision   Complaints comments Pt reports diplopia in the evening, reports left eye was always \"better eye\", reports sees better with right eye closed at times.    [place] Symptoms? No   Magnifier (strength and type) does not know power   Reading glasses (progressive lenses - does not wear)   Technology (no cell phone, no computer)   Lighting and Glare   Is your lighting adequate? Yes/ at home   Is glare a problem? Yes/ outdoors   Are you satisfied with your sunglasses? No   Visual Acuity   Acuity right eye CF at 5 feet   Acuity left eye 20/80   Contrast Sensitivity   Contrast sensitivity (score/25) 25/25   [place] Read   Smallest print size read 0.8M   Critical print size 1.6M   Words per minute at critical print size 200   SRAFVP SCORING   # relevant measures 36   Composite score 42   Percentage of disability (%) 41.67   Education   Learner Patient;Family   Readiness Eager;Acceptance   Method Booklet/handout;Explanation;Demonstration   Response Verbalizes understanding;Needs reinforcement   Clinical Impression, OT Eval   Criteria for Skilled Therapeutic Interventions Met yes;treatment indicated   Therapy  Diagnosis: Impaired ADL/IADL with deficits in Reading based ADL;Written communication;Home management  (orientation, calendar management. management light/glare)   Assessment of Occupational Performance 5 or more Performance Deficits   Identified Performance Deficits as above   Clinical Decision Making (Complexity) Low complexity   OT Visual " Rehabilitation Evaluation Plan   Therapy Plan Occupational therapy intervention   Planned Interventions Low vision compensatory training for reading;Low vision compensatory training for written communication;Instruction in environmental adaptations for glare;Instruction in environmental adaptations for lighting;Optical device/ADL device instruction and training;Instruction in community resources  (management light/glare/dry eye)   Frequency / Duration 4 tx visits over 12 weeks - 1X every 3 weeks for 12 weeks   Risks and Benefits of Treatment have been explained. Yes   Patient, Family in agreement with plan of care Yes   GOALS   Goals Near Vision;Environmental Modification;Resource Education   Goals Addressed this Session Near vision;Environmental modification;Resource education   Goal 1 - Near Vision   Goal Description: Near Vision Patient will verbalize awareness of visual field Loss and demonstrate improved use of visual skills/adaptive equipment for increased independence in reading-based activities of daily living, written communication and detail ADL tasks.   Target Date 08/21/18   Goal 3 - Environmental modification   Goal Description: Environment modification Patient will demonstrate understanding of the impact of lighting, contrast and glare on ADL activities, in conjunction with environmentally-based ADL modifications   Target Date 08/21/18   Goal 4 - Resource education   Goal Description: Resource education Patient will verbalize awareness of community resources for the following:;Audio access to print materials;Access to large print materials;Access to low vision devices   Target Date 08/21/18   Total Evaluation Time   Total Evaluation Time 45   Therapy Certification   Certification date from 05/29/18   Certification date to 08/21/18   Medical Diagnosis glaucoma, dry eye, question strabismus

## 2018-06-05 ENCOUNTER — HOSPITAL ENCOUNTER (OUTPATIENT)
Dept: OCCUPATIONAL THERAPY | Facility: CLINIC | Age: 83
Setting detail: THERAPIES SERIES
End: 2018-06-05
Attending: OPHTHALMOLOGY
Payer: MEDICARE

## 2018-06-05 PROCEDURE — 40000249 ZZH STATISTIC VISIT LOW VISION CLINIC: Performed by: OCCUPATIONAL THERAPIST

## 2018-06-05 PROCEDURE — 97535 SELF CARE MNGMENT TRAINING: CPT | Mod: GO | Performed by: OCCUPATIONAL THERAPIST

## 2018-06-05 NOTE — DISCHARGE INSTRUCTIONS
Visual Rehabilitation Summary  1.  Consider reading glasses from the [employer].  You used a +2.0 power. They will be strictly for reading - do not wear to walk around, as they will make things in the distance blurry.  2. Consider ordering light gray fitover sunglasses.  They are on page 44 in the catalog.    a. Order number [id number] , price $14.25  3. Consider using the bright light you have on the other end of the troy. See if you like that light better.   Mary Chew, OTR/L  [phone]

## 2018-06-07 ENCOUNTER — TELEPHONE (OUTPATIENT)
Dept: PEDIATRICS | Facility: CLINIC | Age: 83
End: 2018-06-07

## 2018-06-07 NOTE — TELEPHONE ENCOUNTER
2 encounters for same fall.    Spoke to patients daughter.  See other telephone encounter from today 6/7/2018      Katie Prater RN,   [facility]

## 2018-06-07 NOTE — TELEPHONE ENCOUNTER
2 encounter for fall 6/7/2018     Kady, daughter, states patient fell on Tuesday at [facility].  The nurse at the Assisted living did look at it and bandaged it.  She looked at it again today and said she should be seen by Dr. Trujillo tomorrow.    Also Kady states that they used to be able to purchase services at [facility], like nursing services but since they are independent this is no longer an option.   They looked at her arm to be helpful and kind.    Kady wonders if patient can get home care services.      Katie Prater RN,   [facility], [facility]

## 2018-06-07 NOTE — TELEPHONE ENCOUNTER
Patient called my private phone line stating she had a bad fall at her home yesterday at [facility] and her arm is pretty bruised up and they need Dr. Trujillo to look at it and re bandage it on 06/08/18.  I advised patient that Dr. Trujillo's schedule is full tomorrow so I would need to have Dr. Trujillo or an RN call her back to get more details on what happened and what steps we should take.  Patient stated understanding.   Patient states she would be fine with us ordering a home care nurse to come out to her house tomorrow?    Brie Galindo CMA

## 2018-06-07 NOTE — TELEPHONE ENCOUNTER
Spoke to Dr. Trujillo.  Scheduled double booked patient for tomorrow at 11:10am.    If patient is not home bound then would not qualify for home care services but could do private pay for home care.  Can discuss tomorrow with  at office visit.      Informed daughter Kady of above.  They will be here tomorrow at 11:10am.      Katie Prater RN,   [facility], [facility]

## 2018-06-07 NOTE — TELEPHONE ENCOUNTER
Reason for call:  Order   Order or referral being requested: wants home care, fell on 06.05.18.  Injured arm, scraped it badly.  Daughter wants to know if home care can be ordered. Patient had a dizzy spell.  Lives at [facility].  Can Dr Trujillo team put I order for home care, because they have independent liviing.  Reason for request: needs home care, needs to be seen for arm  Date needed: as soon as possible  Has the patient been seen by the PCP for this problem? YES    Additional comments: daughter:  Kady brooks, [phone]  And cell:  [phone]    Phone number to reach patient:      Best Time:  any    Can we leave a detailed message on this number?  YES

## 2018-06-08 ENCOUNTER — OFFICE VISIT (OUTPATIENT)
Dept: PEDIATRICS | Facility: CLINIC | Age: 83
End: 2018-06-08
Payer: COMMERCIAL

## 2018-06-08 VITALS
WEIGHT: 119.6 LBS | HEIGHT: 58 IN | SYSTOLIC BLOOD PRESSURE: 114 MMHG | DIASTOLIC BLOOD PRESSURE: 52 MMHG | BODY MASS INDEX: 25.11 KG/M2 | TEMPERATURE: 98.3 F | HEART RATE: 59 BPM

## 2018-06-08 DIAGNOSIS — T14.8XXA OPEN WOUND: Primary | ICD-10-CM

## 2018-06-08 PROCEDURE — 99213 OFFICE O/P EST LOW 20 MIN: CPT | Performed by: INTERNAL MEDICINE

## 2018-06-08 NOTE — MR AVS SNAPSHOT
After Visit Summary   6/8/2018    Nancy eBnz    MRN: [id number]           Patient Information     Date Of Birth          8/5/1922        Visit Information        Provider Department      6/8/2018 11:10 AM Candy Trujillo MD PhD [facility]        Today's Diagnoses     Open wound    -  1      Care Instructions    Change dressing daily until home care assessment.           Follow-ups after your visit        Additional Services     HOME CARE NURSING REFERRAL       **Order classes of: [facility], [facility] and [facility] will route to the Home Care and Hospice Referral Pool.  Home Care or Hospice will then contact the patient to schedule their appointment.**    If you do not hear from Home Care and Hospice, or you would like to call to schedule, please call the referring place of service that your provider has listed below.  ______________________________________________________________________    Your provider has referred you to: [facility]: [facility] [phone]   [email or web address]    Extended Emergency Contact Information  Primary Emergency Contact: Kady Bellamy  Address: [address] [place] [address]  Home Phone: [phone]  Work Phone: none  Mobile Phone: [phone]  Relation: Daughter  Hearing or visual needs: None  Other needs: None  Preferred language: English   needed? No  Secondary Emergency Contact: Melisa Flores  Address: [address] [place] [address] [facility]  Home Phone: [phone]  Work Phone: none  Mobile Phone: [phone]  Relation: Daughter  Hearing or visual needs: None  Other needs: None  Preferred language: English   needed? No    Patient Anticipated Discharge Date: n/a   RN, PT, HHA to begin 24 - 48 hours after discharge.  PLEASE EVALUATE AND TREAT (Evaluation timeline is 24 - 48 hrs. Please call if there is  need for a variance to this timeline).    REASON FOR REFERRAL: Assessment & Treatment: RN    ADDITIONAL SERVICES NEEDED: n/a    OTHER PERTINENT INFORMATION: Patient was last seen by provider on 6/8/2018  for open wound.    Current Outpatient Prescriptions:  albuterol (2.5 MG/3ML) 0.083% neb solution, Take 1 vial by nebulization every 6 hours as needed for shortness of breath / dyspnea or wheezing, Disp: , Rfl:   albuterol (PROAIR HFA/PROVENTIL HFA/VENTOLIN HFA) 108 (90 BASE) MCG/ACT Inhaler, Inhale 2 puffs into the lungs every 4 hours as needed for shortness of breath / dyspnea or wheezing, Disp: 3 Inhaler, Rfl: 3  bimatoprost (LUMIGAN) 0.01 % SOLN, Place 1 drop into both eyes At Bedtime, Disp: 3 Bottle, Rfl: 11  brimonidine-timolol (COMBIGAN) 0.2-0.5 % ophthalmic solution, Place 1 drop into both eyes 2 times daily, Disp: 10 mL, Rfl: 11  Calcium Carb-Cholecalciferol (CALCIUM 600 + D) 600-200 MG-UNIT TABS, Take 1 tablet by mouth daily Vitamin U=1115 iu, Disp: , Rfl:   cycloSPORINE (RESTASIS) 0.05 % ophthalmic emulsion, Place 1 drop into both eyes every 12 hours, Disp: 1 Box, Rfl: 11  DILT- MG 24 hr capsule, TAKE 1 CAPSULE EVERY DAY, Disp: 90 capsule, Rfl: 0  doxepin (SINEQUAN) 10 MG/ML (HIGH CONC) solution, Take 0.3 mLs (3 mg) by mouth At Bedtime, Disp: 118 mL, Rfl: 0  FLUoxetine (PROZAC) 20 MG capsule, Take 1 capsule (20 mg) by mouth daily, Disp: 90 capsule, Rfl: 1  fluticasone (FLOVENT HFA) 220 MCG/ACT Inhaler, Inhale 2 puffs into the lungs 2 times daily, Disp: 36 g, Rfl: 3  gabapentin (NEURONTIN) 100 MG capsule, Take 2 capsules (200 mg) by mouth daily (late afternoon), Disp: 180 capsule, Rfl: 3  ipratropium - albuterol 0.5 mg/2.5 mg/3 mL (DUONEB) 0.5-2.5 (3) MG/3ML, Take 1 vial by nebulization every 6 hours as needed for shortness of breath / dyspnea or wheezing, Disp: , Rfl:   pantoprazole (PROTONIX) 20 MG EC tablet, Take by mouth 30-60 minutes before a meal., Disp: 90 tablet, Rfl: 3  polyethylene glycol  0.4%- propylene glycol 0.3% (SYSTANE) 0.4-0.3 % SOLN ophthalmic solution, Place 1 drop into both eyes 3 times daily as needed for dry eyes, Disp: , Rfl:   pravastatin (PRAVACHOL) 40 MG tablet, TAKE 1/2 TABLET EVERY DAY, Disp: 45 tablet, Rfl: 0  Respiratory Therapy Supplies ROBERTO, Optichamber or equivalent to use with spacer., Disp: 1 each, Rfl: 0  senna-docusate (SENOKOT-S;PERICOLACE) 8.6-50 MG per tablet, Take 1 tablet by mouth daily, Disp: , Rfl:   LORazepam (ATIVAN) 0.5 MG tablet, Take 0.5-1 tablets (0.25-0.5 mg) by mouth once as needed for anxiety (take 30 minutes prior to MRI) Take 30 minutes prior to departure.  Do not operate a vehicle after taking this medication (Patient not taking: Reported on 6/8/2018), Disp: 4 tablet, Rfl: 0  traZODone (DESYREL) 50 MG tablet, Take 0.5-1 tablets (25-50 mg) by mouth nightly as needed for sleep (Patient not taking: Reported on 6/8/2018), Disp: 30 tablet, Rfl: 1      Patient Active Problem List:     Glaucoma     Keratoconjunctivitis sicca, not specified as Sjogren's, left     Cataract     Pseudophakia of both eyes     Presbyopia     Monoclonal gammopathy     Nonrheumatic aortic valve insufficiency     Dizziness     History of vertebral fracture     Nocturnal oxygen desaturation     Urgency incontinence     Hyperlipidemia LDL goal <130     Depression with anxiety     Restless leg syndrome     Mild persistent asthma without complication     ACP (advance care planning)     Chronic constipation     Prediabetes     Simple chronic bronchitis (H)     Senile osteoporosis     Essential hypertension      Documentation of Face to Face and Certification for Home Health Services    I certify that patientNancy is under my care and that I, or a Nurse Practitioner or Physician's Assistant working with me, had a face-to-face encounter that meets the physician face-to-face encounter requirements with this patient on: 6/8/2018.    This encounter with the patient was in whole, or in  part, for the following medical condition, which is the primary reason for Home Health Care: yes.    I certify that, based on my findings, the following services are medically necessary Home Health Services: Nursing    My clinical findings support the need for the above services because: Nurse is needed: For wound care because the patient needs instruction and cannot perform care on their own due to: the wound on her arm    Further, I certify that my clinical findings support that this patient is homebound (i.e. absences from home require considerable and taxing effort and are for medical reasons or [employer] services or infrequently or of short duration when for other reasons) because: Requires assistance of another person or specialized equipment to access medical services because patient: Requires supervision of another for safe transfer..    Based on the above findings, I certify that this patient is confined to the home and needs intermittent skilled nursing care, physical therapy and/or speech therapy.  The patient is under my care, and I have initiated the establishment of the plan of care.  This patient will be followed by a physician who will periodically review the plan of care.    Physician/Provider to provide follow up care: Candy Trujillo certified Physician at time of discharge:     Please be aware that coverage of these services is subject to the terms and limitations of your health insurance plan.  Call member services at your health plan with any benefit or coverage questions.                  Your next 10 appointments already scheduled     Jun 12, 2018 11:00 AM CDT   L/Vision Treatment with Mary Chew OT   [facility] ([facility])    [address] [facility] [address]   [phone]            Jun 14, 2018  2:00 PM CDT   Return Visit with Sobia Terrazas MD   [facility] ([facility])    [address]  [facility] [address]   [phone]            Jun 19, 2018 11:00 AM CDT   L/Vision Treatment with Mary Chew OT   [facility] ([facility])    [address] [place] [address]   [phone]            Sep 19, 2018  9:00 AM CDT   LAB with LAB FIRST FLOOR [facility] ([facility])    [address] [place] [address]   [phone]           Please do not eat 10-12 hours before your appointment if you are coming in fasting for labs on lipids, cholesterol, or glucose (sugar). This does not apply to pregnant women. Water, hot tea and black coffee (with nothing added) are okay. Do not drink other fluids, diet soda or chew gum.            Sep 19, 2018  9:30 AM CDT   Return Visit with Candy Trujillo MD PhD   [facility]    [address] [place] [address]   [phone]              Who to contact     If you have questions or need follow up information about today's clinic visit or your schedule please contact [facility] directly at [phone].  Normal or non-critical lab and imaging results will be communicated to you by [employer], letter or phone within 4 business days after the clinic has received the results. If you do not hear from us within 7 days, please contact the clinic through MyChart or phone. If you have a critical or abnormal lab result, we will notify you by phone as soon as possible.  Submit refill requests through [employer] or call your pharmacy and they will forward the refill request to us. Please allow 3 business days for your refill to be completed.          Additional Information About Your Visit        [employer] Information     [employer] gives you secure access to your electronic health record. If you see a primary care provider, you can also send messages to your care team and make appointments. If you have  "questions, please call your primary care clinic.  If you do not have a primary care provider, please call [phone] and they will assist you.      [employer] is an electronic gateway that provides easy, online access to your medical records. With [employer], you can request a clinic appointment, read your test results, renew a prescription or communicate with your care team.     To access your existing account, please contact your [facility] or call [phone] for assistance.        Care EveryWhere ID     This is your Care EveryWhere ID. This could be used by other organizations to access your [facility] medical records  [id number]        Your Vitals Were     Pulse Temperature Height Peak Flow BMI (Body Mass Index)       59 98.3  F (36.8  C) (Temporal) 4' 9.75\" (1.467 m) 95 L/min 25.21 kg/m2        Blood Pressure from Last 3 Encounters:   06/08/18 114/52   05/01/18 130/70   04/19/18 119/58    Weight from Last 3 Encounters:   06/08/18 119 lb 9.6 oz (54.3 kg)   05/01/18 121 lb 12.8 oz (55.2 kg)   04/19/18 116 lb (52.6 kg)              We Performed the Following     HOME CARE NURSING REFERRAL        Primary Care Provider Office Phone # Fax #    Candy Trujillo MD PhD [phone]       [address] [facility] [address]        Equal Access to Services     [facility] Center[facility]IANA AH: Hadii aad ku hadasho Soomaali, waaxda luqadaha, qaybta kaalmada adeegyada, clark alfredo . So [facility] [phone].    ATENCIÓN: Si habla español, tiene a crandall disposición servicios gratuitos de asistencia lingüística. Llame al [phone].    We comply with applicable federal civil rights laws and [place] laws. We do not discriminate on the basis of race, color, national origin, age, disability, sex, sexual orientation, or gender identity.            Thank you!     Thank you for choosing [facility]  for your care. Our goal is always to provide you with excellent care. " Hearing back from our patients is one way we can continue to improve our services. Please take a few minutes to complete the written survey that you may receive in the mail after your visit with us. Thank you!             Your Updated Medication List - Protect others around you: Learn how to safely use, store and throw away your medicines at www.disposemymeds.org.          This list is accurate as of 6/8/18 12:29 PM.  Always use your most recent med list.                   Brand Name Dispense Instructions for use Diagnosis    * albuterol (2.5 MG/3ML) 0.083% neb solution      Take 1 vial by nebulization every 6 hours as needed for shortness of breath / dyspnea or wheezing        * albuterol 108 (90 Base) MCG/ACT Inhaler    PROAIR HFA/PROVENTIL HFA/VENTOLIN HFA    3 Inhaler    Inhale 2 puffs into the lungs every 4 hours as needed for shortness of breath / dyspnea or wheezing    Moderate persistent asthma with exacerbation       bimatoprost 0.01 % Soln    LUMIGAN    3 Bottle    Place 1 drop into both eyes At Bedtime    Primary open angle glaucoma of both eyes, unspecified glaucoma stage       brimonidine-timolol 0.2-0.5 % ophthalmic solution    COMBIGAN    10 mL    Place 1 drop into both eyes 2 times daily    Primary open angle glaucoma of both eyes, moderate stage       CALCIUM 600 + D 600-200 MG-UNIT Tabs   Generic drug:  Calcium Carb-Cholecalciferol      Take 1 tablet by mouth daily Vitamin C=8207 iu    Keratoconjunctivitis sicca, not specified as Sjogren's, left, Glaucoma       cycloSPORINE 0.05 % ophthalmic emulsion    RESTASIS    1 Box    Place 1 drop into both eyes every 12 hours    Keratoconjunctivitis sicca, not specified as Sjogren's, left       DILT- MG 24 hr capsule   Generic drug:  diltiazem     90 capsule    TAKE 1 CAPSULE EVERY DAY    Essential hypertension with goal blood pressure less than 140/90       doxepin 10 MG/ML (HIGH CONC) solution    SINEquan    118 mL    Take 0.3 mLs (3 mg) by mouth At  Bedtime    Chronic insomnia       FLUoxetine 20 MG capsule    PROzac    90 capsule    Take 1 capsule (20 mg) by mouth daily    Depression with anxiety       fluticasone 220 MCG/ACT Inhaler    FLOVENT HFA    36 g    Inhale 2 puffs into the lungs 2 times daily    Simple chronic bronchitis (H)       gabapentin 100 MG capsule    NEURONTIN    180 capsule    Take 2 capsules (200 mg) by mouth daily (late afternoon)    Restless leg syndrome       ipratropium - albuterol 0.5 mg/2.5 mg/3 mL 0.5-2.5 (3) MG/3ML    DUONEB     Take 1 vial by nebulization every 6 hours as needed for shortness of breath / dyspnea or wheezing        LORazepam 0.5 MG tablet    ATIVAN    4 tablet    Take 0.5-1 tablets (0.25-0.5 mg) by mouth once as needed for anxiety (take 30 minutes prior to MRI) Take 30 minutes prior to departure.  Do not operate a vehicle after taking this medication    Claustrophobia       pantoprazole 20 MG EC tablet    PROTONIX    90 tablet    Take by mouth 30-60 minutes before a meal.    Peptic ulcer       pravastatin 40 MG tablet    PRAVACHOL    45 tablet    TAKE 1/2 TABLET EVERY DAY    Hyperlipidemia LDL goal <100       Respiratory Therapy Supplies Deja     1 each    Optichamber or equivalent to use with spacer.    Moderate persistent asthma without complication       senna-docusate 8.6-50 MG per tablet    SENOKOT-S;PERICOLACE     Take 1 tablet by mouth daily        SYSTANE 0.4-0.3 % Soln ophthalmic solution   Generic drug:  polyethylene glycol 0.4%- propylene glycol 0.3%      Place 1 drop into both eyes 3 times daily as needed for dry eyes    Keratoconjunctivitis sicca, not specified as Sjogren's, left, Glaucoma       traZODone 50 MG tablet    DESYREL    30 tablet    Take 0.5-1 tablets (25-50 mg) by mouth nightly as needed for sleep    Insomnia due to medical condition       * Notice:  This list has 2 medication(s) that are the same as other medications prescribed for you. Read the directions carefully, and ask your doctor  or other care provider to review them with you.

## 2018-06-08 NOTE — PROGRESS NOTES
SUBJECTIVE:   Nancy Benz is a 89 year old female who presents to clinic today for the following health issues:      Fell on 06/04/18 at [facility]/[place] dizzy after being outside in the sun and fell indoors/Nurse at [facility] advised patient needs to be seen    Duration: 06/04/18    Description Right forearm bruised and skin torn on the carpet    Intensity:  moderate    Accompanying signs and symptoms: Still feeling dizzy/hard to get around    History (similar episodes/previous evaluation): None    Precipitating or alleviating factors: None    Therapies tried and outcome: None     Patient was added on today schedule for dressing change on the arm and set up home care. She resides in independent living area of a senior building. She fell and had skin laceration two days ago. The facility cannot provide dressing change and pt needs home care for this.     ROS:  Constitutional, HEENT, cardiovascular, pulmonary, gi and gu systems are negative, except as otherwise noted.         Current Outpatient Prescriptions on File Prior to Visit:  albuterol (2.5 MG/3ML) 0.083% neb solution Take 1 vial by nebulization every 6 hours as needed for shortness of breath / dyspnea or wheezing   albuterol (PROAIR HFA/PROVENTIL HFA/VENTOLIN HFA) 108 (90 BASE) MCG/ACT Inhaler Inhale 2 puffs into the lungs every 4 hours as needed for shortness of breath / dyspnea or wheezing   bimatoprost (LUMIGAN) 0.01 % SOLN Place 1 drop into both eyes At Bedtime   brimonidine-timolol (COMBIGAN) 0.2-0.5 % ophthalmic solution Place 1 drop into both eyes 2 times daily   Calcium Carb-Cholecalciferol (CALCIUM 600 + D) 600-200 MG-UNIT TABS Take 1 tablet by mouth daily Vitamin O=0951 iu   cycloSPORINE (RESTASIS) 0.05 % ophthalmic emulsion Place 1 drop into both eyes every 12 hours   DILT- MG 24 hr capsule TAKE 1 CAPSULE EVERY DAY   doxepin (SINEQUAN) 10 MG/ML (HIGH CONC) solution Take 0.3 mLs (3 mg) by mouth At Bedtime   FLUoxetine (PROZAC) 20 MG  capsule Take 1 capsule (20 mg) by mouth daily   fluticasone (FLOVENT HFA) 220 MCG/ACT Inhaler Inhale 2 puffs into the lungs 2 times daily   gabapentin (NEURONTIN) 100 MG capsule Take 2 capsules (200 mg) by mouth daily (late afternoon)   ipratropium - albuterol 0.5 mg/2.5 mg/3 mL (DUONEB) 0.5-2.5 (3) MG/3ML Take 1 vial by nebulization every 6 hours as needed for shortness of breath / dyspnea or wheezing   pantoprazole (PROTONIX) 20 MG EC tablet Take by mouth 30-60 minutes before a meal.   polyethylene glycol 0.4%- propylene glycol 0.3% (SYSTANE) 0.4-0.3 % SOLN ophthalmic solution Place 1 drop into both eyes 3 times daily as needed for dry eyes   pravastatin (PRAVACHOL) 40 MG tablet TAKE 1/2 TABLET EVERY DAY   Respiratory Therapy Supplies ROBERTO Optichamber or equivalent to use with spacer.   senna-docusate (SENOKOT-S;PERICOLACE) 8.6-50 MG per tablet Take 1 tablet by mouth daily   LORazepam (ATIVAN) 0.5 MG tablet Take 0.5-1 tablets (0.25-0.5 mg) by mouth once as needed for anxiety (take 30 minutes prior to MRI) Take 30 minutes prior to departure.  Do not operate a vehicle after taking this medication   traZODone (DESYREL) 50 MG tablet Take 0.5-1 tablets (25-50 mg) by mouth nightly as needed for sleep     No current facility-administered medications on file prior to visit.        Patient Active Problem List   Diagnosis     Glaucoma     Keratoconjunctivitis sicca, not specified as Sjogren's, left     Cataract     Pseudophakia of both eyes     Presbyopia     Monoclonal gammopathy     Nonrheumatic aortic valve insufficiency     Dizziness     History of vertebral fracture     Nocturnal oxygen desaturation     Urgency incontinence     Hyperlipidemia LDL goal <130     Depression with anxiety     Restless leg syndrome     Mild persistent asthma without complication     ACP (advance care planning)     Chronic constipation     Prediabetes     Simple chronic bronchitis (H)     Senile osteoporosis     Essential hypertension      "Past Surgical History:   Procedure Laterality Date     CATARACT IOL, RT/LT       PHACOEMULSIFICATION CLEAR CORNEA WITH STANDARD INTRAOCULAR LENS IMPLANT Left 7/10/07     PHACOEMULSIFICATION WITH INTRAOCULAR LENS IMPLANT, TRABECULECTOMY, COMBINED Right 4/30/01     REPAIR PTOSIS Bilateral 9/12/2016    Procedure: REPAIR PTOSIS;  Surgeon: Deyvi Lei MD;  Location: [facility] OR     REPAIR PTOSIS BROW Left 9/12/2016    Procedure: REPAIR PTOSIS BROW;  Surgeon: Deyvi Lei MD;  Location: [facility] OR       Social History   Substance Use Topics     Smoking status: Never Smoker     Smokeless tobacco: Never Used     Alcohol use Yes     Family History   Problem Relation Age of Onset     Glaucoma Son      Glaucoma Daughter              Problem list, Medication list, Allergies, and Medical/Social/Surgical histories reviewed in [facility] and updated as appropriate.    OBJECTIVE:                                                    /52 (BP Location: Left arm, Patient Position: Sitting, Cuff Size: Adult Regular)  Pulse 59  Temp 98.3  F (36.8  C) (Temporal)  Ht 4' 9.75\" (1.467 m)  Wt 119 lb 9.6 oz (54.3 kg)  PF 95 L/min  BMI 25.21 kg/m2    GENERAL: healthy, alert and no distress  Right forearm has a larger area of skin tear with the skin detachment of an area of 3 x 1.5 inches. There is no surrounding erythema. Tissue looks healthy.         ASSESSMENT/PLAN:                                                      89 year old female with the following diagnoses and treatment plan:      ICD-10-CM    1. Open wound T14.8XXA HOME CARE NURSING REFERRAL       -- applied petroleum dressing covered with non stick pad and wrapped with Kerlex. Home care wound care referral made.     Will call or return to clinic if worsening or symptoms not improving as discussed.  See Patient Instructions.      Candy Trujillo MD-PhD  [facility]    (Note: Chart documentation was done in part with Dragon Voice Recognition software. Although " reviewed after completion, some word and grammatical errors may remain.)

## 2018-06-10 ENCOUNTER — DOCUMENTATION ONLY (OUTPATIENT)
Dept: CARE COORDINATION | Facility: CLINIC | Age: 83
End: 2018-06-10

## 2018-06-10 NOTE — PROGRESS NOTES
[facility] now requests orders and shares plan of care/discharge summaries for some patients through [employer].  Please REPLY TO THIS MESSAGE OR ROUTE BACK TO THE AUTHOR in order to give authorization for orders when needed.  This is considered a verbal order, you will still receive a faxed copy of orders for signature.  Thank you for your assistance in improving collaboration for our patients.    ORDER  SN 3w2, 2w3, 1w4, 3 PRN  PT eval and treat   OT eval and treat    Daily wound care as follows until WOCN eval.  Cleanse wound with wound cleanser/ns, apply adaptic, cover with telfa, and wrap with kerlix, tape to secure.    Family to complete on non nursing days.

## 2018-06-12 ENCOUNTER — HOSPITAL ENCOUNTER (OUTPATIENT)
Dept: OCCUPATIONAL THERAPY | Facility: CLINIC | Age: 83
Setting detail: THERAPIES SERIES
End: 2018-06-12
Attending: OPHTHALMOLOGY
Payer: MEDICARE

## 2018-06-12 PROCEDURE — G8988 SELF CARE GOAL STATUS: HCPCS | Mod: GO,CJ | Performed by: OCCUPATIONAL THERAPIST

## 2018-06-12 PROCEDURE — G8989 SELF CARE D/C STATUS: HCPCS | Mod: GO,CI | Performed by: OCCUPATIONAL THERAPIST

## 2018-06-12 PROCEDURE — 97535 SELF CARE MNGMENT TRAINING: CPT | Mod: GO | Performed by: OCCUPATIONAL THERAPIST

## 2018-06-12 PROCEDURE — 40000249 ZZH STATISTIC VISIT LOW VISION CLINIC: Performed by: OCCUPATIONAL THERAPIST

## 2018-06-12 NOTE — DISCHARGE INSTRUCTIONS
Visual Rehabilitation Summary  1. Writing  a. Use print instead of cursive  b. The bold uniball gel pen is on page 69 or your catalog  c. Consider a large format calendar for the future  2. Marking Dials  a. Use fabric paint or Velcro to marcin dials and keypads.  On your laundry you wanted to marcin high and low heat.  On the stove, you could marcin medium and off.  3. Bingo  a. Consider large print Bingo Card (page 72) and large print playing cards Page 72.  4. Use high contrast, solid backgrounds to make things show up better in home environment. Ie. A solid throw on you bedspread to place things on.  5. The TV viewing binoculars are called [employer] Max TV glasses, and are available from [employer] ($185.0)  Mary Chew, OTR/L  [phone]

## 2018-06-14 ENCOUNTER — OFFICE VISIT (OUTPATIENT)
Dept: DERMATOLOGY | Facility: CLINIC | Age: 83
End: 2018-06-14
Payer: COMMERCIAL

## 2018-06-14 ENCOUNTER — MYC MEDICAL ADVICE (OUTPATIENT)
Dept: DERMATOLOGY | Facility: CLINIC | Age: 83
End: 2018-06-14

## 2018-06-14 ENCOUNTER — DOCUMENTATION ONLY (OUTPATIENT)
Dept: CARE COORDINATION | Facility: CLINIC | Age: 83
End: 2018-06-14

## 2018-06-14 DIAGNOSIS — L30.9 DERMATITIS: Primary | ICD-10-CM

## 2018-06-14 DIAGNOSIS — L82.1 SEBORRHEIC KERATOSIS: ICD-10-CM

## 2018-06-14 PROCEDURE — 99213 OFFICE O/P EST LOW 20 MIN: CPT | Performed by: DERMATOLOGY

## 2018-06-14 RX ORDER — TACROLIMUS 0.3 MG/G
OINTMENT TOPICAL
Qty: 30 G | Refills: 0 | Status: SHIPPED | OUTPATIENT
Start: 2018-06-14 | End: 2018-09-19

## 2018-06-14 NOTE — NURSING NOTE
Nancy Benz's goals for this visit include:   Chief Complaint   Patient presents with     Skin Check     Hx of AK; pt has concern of right side of forehead and back       She requests these members of her care team be copied on today's visit information: PCP    PCP: Candy Trujillo    Referring Provider:  No referring provider defined for this encounter.    There were no vitals taken for this visit.    Do you need any medication refills at today's visit? None    Susanna Allen, [facility]

## 2018-06-14 NOTE — PROGRESS NOTES
[facility] Dermatology Note      Dermatology Problem List:  1. Actinic keratosis  -s/p cryotherapy  2. Periocular dermatitis, likely from eye drop use  -Current Tx: hydrocortisone 2.5% cream. (initiated 6/28/2016) without improvement, we have switched to ointment  -declines patch testing  3. Notalgia paraesthetica     Encounter Date: Jun 14, 2018    CC:  Chief Complaint   Patient presents with     Skin Check     Hx of AK; pt has concern of right side of forehead and back         History of Present Illness:  Ms. Nancy Benz is a 89 year old female who presents as follow up for history of actinic keratosis. The patient was last seen 10/24/2017 by Dr. Natarajan who noted her notalgia paraesthetica. She has no history of lymphoma. The patient reports that she has several areas of concern:     1) A spot on the right side of the forehead.     2) told nurse there was a spot on back but denies on further interview    3) The patient reports that she is still pink under the eyes. The cream she was prescribed does not improve her. The patient is not interested in patch testing. It is not pruritic.     4) She has wound due to the fall. Nurse coming in for home care who treats this    present.   No other reported lesions.     Past Medical History:   Patient Active Problem List   Diagnosis     Glaucoma     Keratoconjunctivitis sicca, not specified as Sjogren's, left     Cataract     Pseudophakia of both eyes     Presbyopia     Monoclonal gammopathy     Nonrheumatic aortic valve insufficiency     Dizziness     History of vertebral fracture     Nocturnal oxygen desaturation     Urgency incontinence     Hyperlipidemia LDL goal <130     Depression with anxiety     Restless leg syndrome     Mild persistent asthma without complication     ACP (advance care planning)     Chronic constipation     Prediabetes     Simple chronic bronchitis (H)     Senile osteoporosis     Essential hypertension     Past Medical  History:   Diagnosis Date     Arthritis      Bilateral presbyopia      Cataract 2001/2007     COPD (chronic obstructive pulmonary disease) (H)      Depressive disorder      Essential hypertension 9/11/2017     Monoclonal gammopathy      Nonrheumatic aortic valve insufficiency      Open-angle glaucoma 1969    both eyes     Uncomplicated asthma      Past Surgical History:   Procedure Laterality Date     CATARACT IOL, RT/LT       PHACOEMULSIFICATION CLEAR CORNEA WITH STANDARD INTRAOCULAR LENS IMPLANT Left 7/10/07     PHACOEMULSIFICATION WITH INTRAOCULAR LENS IMPLANT, TRABECULECTOMY, COMBINED Right 4/30/01     REPAIR PTOSIS Bilateral 9/12/2016    Procedure: REPAIR PTOSIS;  Surgeon: Deyvi Lei MD;  Location: [facility] OR     REPAIR PTOSIS BROW Left 9/12/2016    Procedure: REPAIR PTOSIS BROW;  Surgeon: Deyvi Lei MD;  Location: [facility] OR     Social History:  The patient is retired.  The patient has three children and great grandchildren. She has been  over 75 years.     Family History:  There is no family history of skin cancer.  There is a family history of asthma and hay fever.     Medications:  Current Outpatient Prescriptions   Medication Sig Dispense Refill     albuterol (2.5 MG/3ML) 0.083% neb solution Take 1 vial by nebulization every 6 hours as needed for shortness of breath / dyspnea or wheezing       albuterol (PROAIR HFA/PROVENTIL HFA/VENTOLIN HFA) 108 (90 BASE) MCG/ACT Inhaler Inhale 2 puffs into the lungs every 4 hours as needed for shortness of breath / dyspnea or wheezing 3 Inhaler 3     bimatoprost (LUMIGAN) 0.01 % SOLN Place 1 drop into both eyes At Bedtime 3 Bottle 11     brimonidine-timolol (COMBIGAN) 0.2-0.5 % ophthalmic solution Place 1 drop into both eyes 2 times daily 10 mL 11     Calcium Carb-Cholecalciferol (CALCIUM 600 + D) 600-200 MG-UNIT TABS Take 1 tablet by mouth daily Vitamin T=6401 iu       cycloSPORINE (RESTASIS) 0.05 % ophthalmic emulsion Place 1 drop into both eyes every 12  hours 1 Box 11     DILT- MG 24 hr capsule TAKE 1 CAPSULE EVERY DAY 90 capsule 0     doxepin (SINEQUAN) 10 MG/ML (HIGH CONC) solution Take 0.3 mLs (3 mg) by mouth At Bedtime 118 mL 0     FLUoxetine (PROZAC) 20 MG capsule Take 1 capsule (20 mg) by mouth daily 90 capsule 1     fluticasone (FLOVENT HFA) 220 MCG/ACT Inhaler Inhale 2 puffs into the lungs 2 times daily 36 g 3     gabapentin (NEURONTIN) 100 MG capsule Take 2 capsules (200 mg) by mouth daily (late afternoon) 180 capsule 3     ipratropium - albuterol 0.5 mg/2.5 mg/3 mL (DUONEB) 0.5-2.5 (3) MG/3ML Take 1 vial by nebulization every 6 hours as needed for shortness of breath / dyspnea or wheezing       LORazepam (ATIVAN) 0.5 MG tablet Take 0.5-1 tablets (0.25-0.5 mg) by mouth once as needed for anxiety (take 30 minutes prior to MRI) Take 30 minutes prior to departure.  Do not operate a vehicle after taking this medication 4 tablet 0     pantoprazole (PROTONIX) 20 MG EC tablet Take by mouth 30-60 minutes before a meal. 90 tablet 3     polyethylene glycol 0.4%- propylene glycol 0.3% (SYSTANE) 0.4-0.3 % SOLN ophthalmic solution Place 1 drop into both eyes 3 times daily as needed for dry eyes       pravastatin (PRAVACHOL) 40 MG tablet TAKE 1/2 TABLET EVERY DAY 45 tablet 0     Respiratory [facility] or equivalent to use with spacer. 1 each 0     senna-docusate (SENOKOT-S;PERICOLACE) 8.6-50 MG per tablet Take 1 tablet by mouth daily       traZODone (DESYREL) 50 MG tablet Take 0.5-1 tablets (25-50 mg) by mouth nightly as needed for sleep 30 tablet 1     Allergies   Allergen Reactions     Hydrocodone-Acetaminophen Other (See Comments)     Nausea, dizzy, felt awful     Pilocarpine      Timolol Difficulty breathing     Makes asthma worse    Oral tabs.. ophth sol does not cause a problem     Review of Systems:  -Skin: As above in HPI. No additional skin concerns.  -Const: The patient is generally feeling well today. She has had a recent fall.      Physical exam:  Vitals: There were no vitals taken for this visit.  GEN: This is a well developed, well-nourished female in no acute distress, in a pleasant mood.    SKIN: Total skin excluding the undergarment areas was performed. The exam included the head/face, neck, both arms, chest, back, abdomen, both legs, digits and/or nails.   - There are waxy stuck on tan to brown papules on the face, trunk.  -Superficial ulceration on the right forearm with overlying dressing, approx 6m  -Mild erythema on the periorbital region.   -No other lesions of concern on areas examined.     Impression/Plan:  1. History of actinic keratosis, no clinical evidence of recurrence.   Last total skin exam 6/28/2016  2. Dermatitis, favor irritant dermatitis. Consider contact dermatitis due to eye drop use  Start protopic twice daily. Discussed black box warning.   Hold hydrocortisone, let us know if not improving  3. Seborrheic keratosis, face and trunk    No further intervention required at this time.   4. Superficial wound post-fall     Follow up with PCP for fall, continue regular dressing with home care urse    Today, we will clean this with gentle soap and water and dress with vaseline gauze and ACE bandage and coban  5. Notalgia paraesthetica     Not addressed today    Follow-up in 1 year, earlier for new or changing lesions.    Staff Involved:  Scribe/Staff    Scribe Disclosure:   I, Mary Lou Rodrigues, am serving as a scribe to document services personally performed by Dr. Sobia Terrazas, based on data collection and the provider's statements to me.       Provider Disclosure:   The documentation recorded by the scribe accurately reflects the services I personally performed and the decisions made by me.    Sobia Terrazas MD    Department of Dermatology  [facility]: Phone: [phone], Fax:[phone]  [facility]: Phone: [phone], Fax: [phone]

## 2018-06-14 NOTE — MR AVS SNAPSHOT
After Visit Summary   6/14/2018    Nancy Benz    MRN: [id number]           Patient Information     Date Of Birth          8/5/1922        Visit Information        Provider Department      6/14/2018 2:00 PM Sobia Terrazas MD [facility]        Today's Diagnoses     Dermatitis    -  1    Ulceration (H)        Seborrheic keratosis           Follow-ups after your visit        Your next 10 appointments already scheduled     Sep 19, 2018  9:00 AM CDT   LAB with [facility] ([facility])    [address] [place] [id number] [phone]           Please do not eat 10-12 hours before your appointment if you are coming in fasting for labs on lipids, cholesterol, or glucose (sugar). This does not apply to pregnant women. Water, hot tea and black coffee (with nothing added) are okay. Do not drink other fluids, diet soda or chew gum.            Sep 19, 2018  9:30 AM CDT   Return Visit with Candy Trujillo MD PhD   [facility]    [address] [address] [place] [id number]   [phone]              Who to contact     If you have questions or need follow up information about today's clinic visit or your schedule please contact [facility] directly at [phone].  Normal or non-critical lab and imaging results will be communicated to you by MyChart, letter or phone within 4 business days after the clinic has received the results. If you do not hear from us within 7 days, please contact the clinic through MyChart or phone. If you have a critical or abnormal lab result, we will notify you by phone as soon as possible.  Submit refill requests through thinkingphones or call your pharmacy and they will forward the refill request to us. Please allow 3 business days for your refill to be completed.          Additional Information About Your Visit        MyChart Information      [employer] gives you secure access to your electronic health record. If you see a primary care provider, you can also send messages to your care team and make appointments. If you have questions, please call your primary care clinic.  If you do not have a primary care provider, please call [phone] and they will assist you.      [employer] is an electronic gateway that provides easy, online access to your medical records. With [employer], you can request a clinic appointment, read your test results, renew a prescription or communicate with your care team.     To access your existing account, please contact your [facility] or call [phone] for assistance.        Care EveryWhere ID     This is your Care EveryWhere ID. This could be used by other organizations to access your [facility] medical records  [id number]         Blood Pressure from Last 3 Encounters:   06/08/18 114/52   05/01/18 130/70   04/19/18 119/58    Weight from Last 3 Encounters:   06/08/18 54.3 kg (119 lb 9.6 oz)   05/01/18 55.2 kg (121 lb 12.8 oz)   04/19/18 52.6 kg (116 lb)              Today, you had the following     No orders found for display         Today's Medication Changes          These changes are accurate as of 6/14/18  3:06 PM.  If you have any questions, ask your nurse or doctor.               Start taking these medicines.        Dose/Directions    tacrolimus 0.03 % ointment   Commonly known as:  PROTOPIC   Used for:  Dermatitis   Started by:  Sobia Terrazas MD        Apply twice daily to red areas around the eyes for 4 weeks.   Quantity:  30 g   Refills:  0            Where to get your medicines      These medications were sent to [facility] - [place], [place] - [address]  [address], [facility] [address]     Phone:  [phone]     tacrolimus 0.03 % ointment                Primary Care Provider Office Phone # Fax #    Candy Trujillo MD PhD [phone]        [address]  [facility] [address]        Equal Access to Services     LUIZACUATE JOAN : Hadii aad ku hadmichelineo Sofarzadali, waaxda luqadaha, qaybta kaalmada adekendrick, clark carleyin hayaacurtis fayye hoyos lamiriamcurtis topete. So [facility] [phone].    ATENCIÓN: Si habla español, tiene a crandall disposición servicios gratuitos de asistencia lingüística. Llame al [phone].    We comply with applicable federal civil rights laws and [place] laws. We do not discriminate on the basis of race, color, national origin, age, disability, sex, sexual orientation, or gender identity.            Thank you!     Thank you for choosing [facility]  for your care. Our goal is always to provide you with excellent care. Hearing back from our patients is one way we can continue to improve our services. Please take a few minutes to complete the written survey that you may receive in the mail after your visit with us. Thank you!             Your Updated Medication List - Protect others around you: Learn how to safely use, store and throw away your medicines at www.disposemymeds.org.          This list is accurate as of 6/14/18  3:06 PM.  Always use your most recent med list.                   Brand Name Dispense Instructions for use Diagnosis    * albuterol (2.5 MG/3ML) 0.083% neb solution      Take 1 vial by nebulization every 6 hours as needed for shortness of breath / dyspnea or wheezing        * albuterol 108 (90 Base) MCG/ACT Inhaler    PROAIR HFA/PROVENTIL HFA/VENTOLIN HFA    3 Inhaler    Inhale 2 puffs into the lungs every 4 hours as needed for shortness of breath / dyspnea or wheezing    Moderate persistent asthma with exacerbation       bimatoprost 0.01 % Soln    LUMIGAN    3 Bottle    Place 1 drop into both eyes At Bedtime    Primary open angle glaucoma of both eyes, unspecified glaucoma stage       brimonidine-timolol 0.2-0.5 % ophthalmic solution    COMBIGAN    10 mL    Place 1 drop into both eyes 2 times daily    Primary open  angle glaucoma of both eyes, moderate stage       CALCIUM 600 + D 600-200 MG-UNIT Tabs   Generic drug:  Calcium Carb-Cholecalciferol      Take 1 tablet by mouth daily Vitamin Y=7066 iu    Keratoconjunctivitis sicca, not specified as Sjogren's, left, Glaucoma       cycloSPORINE 0.05 % ophthalmic emulsion    RESTASIS    1 Box    Place 1 drop into both eyes every 12 hours    Keratoconjunctivitis sicca, not specified as Sjogren's, left       DILT- MG 24 hr capsule   Generic drug:  diltiazem     90 capsule    TAKE 1 CAPSULE EVERY DAY    Essential hypertension with goal blood pressure less than 140/90       doxepin 10 MG/ML (HIGH CONC) solution    SINEquan    118 mL    Take 0.3 mLs (3 mg) by mouth At Bedtime    Chronic insomnia       FLUoxetine 20 MG capsule    PROzac    90 capsule    Take 1 capsule (20 mg) by mouth daily    Depression with anxiety       fluticasone 220 MCG/ACT Inhaler    FLOVENT HFA    36 g    Inhale 2 puffs into the lungs 2 times daily    Simple chronic bronchitis (H)       gabapentin 100 MG capsule    NEURONTIN    180 capsule    Take 2 capsules (200 mg) by mouth daily (late afternoon)    Restless leg syndrome       ipratropium - albuterol 0.5 mg/2.5 mg/3 mL 0.5-2.5 (3) MG/3ML    DUONEB     Take 1 vial by nebulization every 6 hours as needed for shortness of breath / dyspnea or wheezing        LORazepam 0.5 MG tablet    ATIVAN    4 tablet    Take 0.5-1 tablets (0.25-0.5 mg) by mouth once as needed for anxiety (take 30 minutes prior to MRI) Take 30 minutes prior to departure.  Do not operate a vehicle after taking this medication    Claustrophobia       pantoprazole 20 MG EC tablet    PROTONIX    90 tablet    Take by mouth 30-60 minutes before a meal.    Peptic ulcer       pravastatin 40 MG tablet    PRAVACHOL    45 tablet    TAKE 1/2 TABLET EVERY DAY    Hyperlipidemia LDL goal <100       Respiratory Therapy Supplies Deja     1 each    Optichamber or equivalent to use with spacer.    Moderate  persistent asthma without complication       senna-docusate 8.6-50 MG per tablet    SENOKOT-S;PERICOLACE     Take 1 tablet by mouth daily        SYSTANE 0.4-0.3 % Soln ophthalmic solution   Generic drug:  polyethylene glycol 0.4%- propylene glycol 0.3%      Place 1 drop into both eyes 3 times daily as needed for dry eyes    Keratoconjunctivitis sicca, not specified as Sjogren's, left, Glaucoma       tacrolimus 0.03 % ointment    PROTOPIC    30 g    Apply twice daily to red areas around the eyes for 4 weeks.    Dermatitis       traZODone 50 MG tablet    DESYREL    30 tablet    Take 0.5-1 tablets (25-50 mg) by mouth nightly as needed for sleep    Insomnia due to medical condition       * Notice:  This list has 2 medication(s) that are the same as other medications prescribed for you. Read the directions carefully, and ask your doctor or other care provider to review them with you.

## 2018-06-14 NOTE — PROGRESS NOTES
Dear Dr. Candy Trujillo  [facility] process is that all ordered disciplines will be involved in the development of the plan of care.  The following disciplines were unable to see Nancy Benz; MRN [id number] within the 5 day evaluation window.  There will be a delay in the evalation visit by  PT      We will notify you when the evaluation is completed.  The patient has been notified.      Sincerely [facility]  Jennifer Bermudez  [phone]

## 2018-06-14 NOTE — LETTER
6/14/2018         RE: Nancy Benz  [address] [address] [place] [address]        Dear Colleague,    Thank you for referring your patient, Nancy Benz, to the [facility]. Please see a copy of my visit note below.    [facility] Dermatology Note      Dermatology Problem List:  1. Actinic keratosis  -s/p cryotherapy  2. Periocular dermatitis, likely from eye drop use  -Current Tx: hydrocortisone 2.5% cream. (initiated 6/28/2016) without improvement, we have switched to ointment  -declines patch testing  3. Notalgia paraesthetica     Encounter Date: Jun 14, 2018    CC:  Chief Complaint   Patient presents with     Skin Check     Hx of AK; pt has concern of right side of forehead and back         History of Present Illness:  Ms. Nancy Benz is a 89 year old female who presents as follow up for history of actinic keratosis. The patient was last seen 10/24/2017 by Dr. Natarajan who noted her notalgia paraesthetica. She has no history of lymphoma. The patient reports that she has several areas of concern:     1) A spot on the right side of the forehead.     2) told nurse there was a spot on back but denies on further interview    3) The patient reports that she is still pink under the eyes. The cream she was prescribed does not improve her. The patient is not interested in patch testing. It is not pruritic.     4) She has wound due to the fall. Nurse coming in for home care who treats this    present.   No other reported lesions.     Past Medical History:   Patient Active Problem List   Diagnosis     Glaucoma     Keratoconjunctivitis sicca, not specified as Sjogren's, left     Cataract     Pseudophakia of both eyes     Presbyopia     Monoclonal gammopathy     Nonrheumatic aortic valve insufficiency     Dizziness     History of vertebral fracture     Nocturnal oxygen desaturation     Urgency incontinence     Hyperlipidemia LDL goal <130     Depression  with anxiety     Restless leg syndrome     Mild persistent asthma without complication     ACP (advance care planning)     Chronic constipation     Prediabetes     Simple chronic bronchitis (H)     Senile osteoporosis     Essential hypertension     Past Medical History:   Diagnosis Date     Arthritis      Bilateral presbyopia      Cataract 2001/2007     COPD (chronic obstructive pulmonary disease) (H)      Depressive disorder      Essential hypertension 9/11/2017     Monoclonal gammopathy      Nonrheumatic aortic valve insufficiency      Open-angle glaucoma 1969    both eyes     Uncomplicated asthma      Past Surgical History:   Procedure Laterality Date     CATARACT IOL, RT/LT       PHACOEMULSIFICATION CLEAR CORNEA WITH STANDARD INTRAOCULAR LENS IMPLANT Left 7/10/07     PHACOEMULSIFICATION WITH INTRAOCULAR LENS IMPLANT, TRABECULECTOMY, COMBINED Right 4/30/01     REPAIR PTOSIS Bilateral 9/12/2016    Procedure: REPAIR PTOSIS;  Surgeon: Deyvi Lei MD;  Location: [facility] OR     REPAIR PTOSIS BROW Left 9/12/2016    Procedure: REPAIR PTOSIS BROW;  Surgeon: Deyvi Lei MD;  Location: [facility] OR     Social History:  The patient is retired.  The patient has three children and great grandchildren. She has been  over 75 years.     Family History:  There is no family history of skin cancer.  There is a family history of asthma and hay fever.     Medications:  Current Outpatient Prescriptions   Medication Sig Dispense Refill     albuterol (2.5 MG/3ML) 0.083% neb solution Take 1 vial by nebulization every 6 hours as needed for shortness of breath / dyspnea or wheezing       albuterol (PROAIR HFA/PROVENTIL HFA/VENTOLIN HFA) 108 (90 BASE) MCG/ACT Inhaler Inhale 2 puffs into the lungs every 4 hours as needed for shortness of breath / dyspnea or wheezing 3 Inhaler 3     bimatoprost (LUMIGAN) 0.01 % SOLN Place 1 drop into both eyes At Bedtime 3 Bottle 11     brimonidine-timolol (COMBIGAN) 0.2-0.5 % ophthalmic solution  Place 1 drop into both eyes 2 times daily 10 mL 11     Calcium Carb-Cholecalciferol (CALCIUM 600 + D) 600-200 MG-UNIT TABS Take 1 tablet by mouth daily Vitamin D=2091 iu       cycloSPORINE (RESTASIS) 0.05 % ophthalmic emulsion Place 1 drop into both eyes every 12 hours 1 Box 11     DILT- MG 24 hr capsule TAKE 1 CAPSULE EVERY DAY 90 capsule 0     doxepin (SINEQUAN) 10 MG/ML (HIGH CONC) solution Take 0.3 mLs (3 mg) by mouth At Bedtime 118 mL 0     FLUoxetine (PROZAC) 20 MG capsule Take 1 capsule (20 mg) by mouth daily 90 capsule 1     fluticasone (FLOVENT HFA) 220 MCG/ACT Inhaler Inhale 2 puffs into the lungs 2 times daily 36 g 3     gabapentin (NEURONTIN) 100 MG capsule Take 2 capsules (200 mg) by mouth daily (late afternoon) 180 capsule 3     ipratropium - albuterol 0.5 mg/2.5 mg/3 mL (DUONEB) 0.5-2.5 (3) MG/3ML Take 1 vial by nebulization every 6 hours as needed for shortness of breath / dyspnea or wheezing       LORazepam (ATIVAN) 0.5 MG tablet Take 0.5-1 tablets (0.25-0.5 mg) by mouth once as needed for anxiety (take 30 minutes prior to MRI) Take 30 minutes prior to departure.  Do not operate a vehicle after taking this medication 4 tablet 0     pantoprazole (PROTONIX) 20 MG EC tablet Take by mouth 30-60 minutes before a meal. 90 tablet 3     polyethylene glycol 0.4%- propylene glycol 0.3% (SYSTANE) 0.4-0.3 % SOLN ophthalmic solution Place 1 drop into both eyes 3 times daily as needed for dry eyes       pravastatin (PRAVACHOL) 40 MG tablet TAKE 1/2 TABLET EVERY DAY 45 tablet 0     Respiratory Therapy Supplies ROBERTO Optichamber or equivalent to use with spacer. 1 each 0     senna-docusate (SENOKOT-S;PERICOLACE) 8.6-50 MG per tablet Take 1 tablet by mouth daily       traZODone (DESYREL) 50 MG tablet Take 0.5-1 tablets (25-50 mg) by mouth nightly as needed for sleep 30 tablet 1     Allergies   Allergen Reactions     Hydrocodone-Acetaminophen Other (See Comments)     Nausea, dizzy, felt awful     Pilocarpine       Timolol Difficulty breathing     Makes asthma worse    Oral tabs.. ophth sol does not cause a problem     Review of Systems:  -Skin: As above in HPI. No additional skin concerns.  -Const: The patient is generally feeling well today. She has had a recent fall.     Physical exam:  Vitals: There were no vitals taken for this visit.  GEN: This is a well developed, well-nourished female in no acute distress, in a pleasant mood.    SKIN: Total skin excluding the undergarment areas was performed. The exam included the head/face, neck, both arms, chest, back, abdomen, both legs, digits and/or nails.   - There are waxy stuck on tan to brown papules on the face, trunk.  -Superficial ulceration on the right forearm with overlying dressing, approx 6m  -Mild erythema on the periorbital region.   -No other lesions of concern on areas examined.     Impression/Plan:  1. History of actinic keratosis, no clinical evidence of recurrence.   Last total skin exam 6/28/2016  2. Dermatitis, favor irritant dermatitis. Consider contact dermatitis due to eye drop use  Start protopic twice daily. Discussed black box warning.   Hold hydrocortisone, let us know if not improving  3. Seborrheic keratosis, face and trunk    No further intervention required at this time.   4. Superficial wound post-fall     Follow up with PCP for fall, continue regular dressing with home care urse    Today, we will clean this with gentle soap and water and dress with vaseline gauze and ACE bandage and coban  5. Notalgia paraesthetica     Not addressed today    Follow-up in 1 year, earlier for new or changing lesions.    Staff Involved:  Scribe/Staff    Scribe Disclosure:   I, Mary Lou Rodrigues, am serving as a scribe to document services personally performed by Dr. Sobia Terrazas, based on data collection and the provider's statements to me.       Provider Disclosure:   The documentation recorded by the scribe accurately reflects the services I personally performed  and the decisions made by me.    Sobia Terrazas MD    Department of Dermatology  [facility]: Phone: [phone], Fax:[phone]  [facility] [place]: Phone: [phone], Fax: [phone]        Again, thank you for allowing me to participate in the care of your patient.        Sincerely,        Sobia Terrazas MD

## 2018-06-15 ENCOUNTER — DOCUMENTATION ONLY (OUTPATIENT)
Dept: CARE COORDINATION | Facility: CLINIC | Age: 83
End: 2018-06-15

## 2018-06-15 ENCOUNTER — NURSE TRIAGE (OUTPATIENT)
Dept: NURSING | Facility: CLINIC | Age: 83
End: 2018-06-15

## 2018-06-15 NOTE — TELEPHONE ENCOUNTER
Pt called to change appt for physical therapy today and warm transferred to get to  [facility]  ,   .Hortensia Badillo RN [facility] nurse advisors.

## 2018-06-15 NOTE — PROGRESS NOTES
[facility] now requests orders and shares plan of care/discharge summaries for some patients through [employer].  Please REPLY TO THIS MESSAGE OR ROUTE BACK TO THE AUTHOR in order to give authorization for orders when needed.  This is considered a verbal order, you will still receive a faxed copy of orders for signature.  Thank you for your assistance in improving collaboration for our patients.    ORDER    Wound nurse assessment to R forearm trauma wound from fall.  Recommend using adaptic and non adhesive foam dressing, secure with rolled gauze, use stockinette to hold in place, no tape to skin.  Change 2 to 3 times per week and prn.    Almaz Galvan, RN, CWON

## 2018-06-18 NOTE — ADDENDUM NOTE
Encounter addended by: Mary Chew OT on: 6/18/2018 12:02 PM<BR>     Actions taken: Flowsheet data copied forward, Sign clinical note, Flowsheet accepted, Episode resolved

## 2018-06-18 NOTE — TELEPHONE ENCOUNTER
IF she doesn't want to use it is okay. She can go back to her hydrocrotisone prescription BID for up to 1 week, take 1 week off for flares and vaseline

## 2018-06-18 NOTE — PROGRESS NOTES
"   06/12/18 1000   Signing Clinician's Name / Credentials   Signing clinician's name / credentials Mary Chew OTR/L   Session Number   Session Number 3   Reporting period 05/29-08/21   Authorization status Authorized: Medicare   Recertification   Recertification Due 08/21/18   Progress Notes   Progress Note Due 08/21/18   Progress note completed (Discharge this date)   OT Medicare Only G-code   G-code Self Care   Self Care   Self Care Goal,  (eval/re-eval, every progress note & discharge) CJ: 20-39% impairment   Self Care Discharge Status,  (discharge) CI: 1-19% impairment   Discharge Self Care Modifier Rationale MNRead, Self Report, Clinical Observations   GOALS   Goals Near Vision;Environmental Modification;Resource Education   Goals Addressed this Session Near vision;Environmental modification;Resource education   Goal 1 - Near Vision   Goal Description: Near Vision Patient will verbalize awareness of visual field Loss and demonstrate improved use of visual skills/adaptive equipment for increased independence in reading-based activities of daily living, written communication and detail ADL tasks.   Near Vision Goal Comment Goal met. Identified +2.0 OTC readers and full spectrum task light as methods to meet reading goal. Identified gel pen and use of large print calendar enteries as methods to meet written communication goal.   Target Date 08/21/18   Date Met 06/12/18   Goal 3 - Environmental modification   Goal Description: Environment modification Patient will demonstrate understanding of the impact of lighting, contrast and glare on ADL activities, in conjunction with environmentally-based ADL modifications   Environmental Modification Goal Comment Goal met with ID of full spectrum task light for near tasks, use of large format \"memory\" clocks for orientation, use of high contrast backgorunds for location of objects, and marking methods for setting appliance dials   Target Date 08/21/18   Date Met " "06/12/18   Goal 4 - Resource education   Goal Description: Resource education Patient will verbalize awareness of community resources for the following:;Audio access to print materials;Access to large print materials;Access to low vision devices   Resource Education Goal Comment goal met as written   Target Date 08/21/18   Date Met 06/12/18       Present No   Therapy Diagnosis   Therapy  Diagnosis: Impaired ADL/IADL with deficits in Reading based ADL;Written communication;Home management  (orientation, calendar management. management light/glare)   Subjective Report   Subjective Report \"Those +2.0 glasses worked great for reading!  I can' t believe how simple that is\"   Visual Rehab Treatment Maury   Daily Treatment Maury Self Care/Home Management   Self Care/Home Management   Minutes 60   Skilled Intervention Training in written communication strategies, writing implements;Training in written communication strategies, use of large print to increase visibility and clarity;Instruction in methods for enhancing contrast;Environmental modification, marking appliance dials;Environmental modification, providing visual contrast to obstacles;Organizational strategies incorporating contrast   Patient Response demonstrated excellent understanding   Treatment Detail Written communciaton; provided trial of bold gel pen and print vs. cursive to meet writing goal.  Pt demonstrated ind. in reading own writing utilizing gel pen, and verbalized plan to use print vs. cursive for increased legibility. Recommended large format calendar to allow for large print enteries to increase ind. in calendar management.  Setting appliances: provided training in use of tactile marking methods for ind. in setting appliance dials. Pt and  verbalized plan to marcin stove and laundry dials with puff paint for ind.  Contrast strategies: pt demonstrated ability to locate targets against solid backgrounds that she could not " locate against patterned backgrounds. Identified benefits of adding solid,high contrast backgrounds against household objects for ease of location Pt and her  verbalized good understanding. Avocational: pt reports she plays bingo but is unable to read bingo card numbers. Identiied large print Bingo cards for independence in playing. Distance viewing: Pt trialed Eschenbach TV glasses and identified benefits of binocular glasses for enjoying detail at distance. Provided training in focusing and mechanics of use. Cautioned against walking with binocular glasses on. Pt verbalized and demonstrated understanding. Issued resources.   Progress goals met this date   MN Read   Smallest print size read 0.6M   Critical print size 1.0M   Words per minute at critical print size 200   Equipment/Resources   Written resources provided (marking paint, Eschenback glasses, gel pen and calendar)   Assessments Completed   Assessments Completed MNRead   Education   Learner Patient;Significant  ( Alcides)   Readiness Eager;Acceptance   Method Booklet/handout;Explanation;Demonstration   Response Verbalizes understanding;Demonstrates understanding   Communication with other professionals   Communication with other professionals per EHR   Plan   Plan for next session goals met: Discharge   Total Session Time   Timed Code Treatment Minutes 60   Total Treatment Time (sum of timed and untimed services) 60

## 2018-06-19 RX ORDER — HYDROCORTISONE 25 MG/G
OINTMENT TOPICAL 2 TIMES DAILY
Qty: 30 G | Refills: 0 | Status: SHIPPED | OUTPATIENT
Start: 2018-06-19

## 2018-06-19 NOTE — TELEPHONE ENCOUNTER
Daughter Kady called and notified of Dr. Terrazas's message. Kady verbalizes understanding and prescription ordered and sent to [facility] ..Iraida Smith RN

## 2018-06-25 ENCOUNTER — MYC MEDICAL ADVICE (OUTPATIENT)
Dept: PEDIATRICS | Facility: CLINIC | Age: 83
End: 2018-06-25

## 2018-06-25 DIAGNOSIS — I10 ESSENTIAL HYPERTENSION WITH GOAL BLOOD PRESSURE LESS THAN 140/90: ICD-10-CM

## 2018-06-26 DIAGNOSIS — Z53.9 ERRONEOUS ENCOUNTER--DISREGARD: Primary | ICD-10-CM

## 2018-06-26 RX ORDER — DILTIAZEM HYDROCHLORIDE 120 MG/1
CAPSULE, EXTENDED RELEASE ORAL
Qty: 90 CAPSULE | Refills: 1 | Status: SHIPPED | OUTPATIENT
Start: 2018-06-26 | End: 2018-11-28

## 2018-06-26 RX ORDER — DILTIAZEM HYDROCHLORIDE 120 MG/1
CAPSULE, EXTENDED RELEASE ORAL
Qty: 90 CAPSULE | Refills: 0 | Status: CANCELLED | OUTPATIENT
Start: 2018-06-26

## 2018-06-26 NOTE — TELEPHONE ENCOUNTER
DILT- MG 24 hr capsule 90 capsule 0 12/29/2017  No   Sig: TAKE 1 CAPSULE EVERY DAY     Last OV with Dr. Trujillo: 6/8/2018    Next 5 appointments (look out 90 days)     Sep 19, 2018  9:30 AM CDT   Return Visit with Candy Trujillo MD PhD   [facility]    [address] [place] [id number] [phone]                Potassium   Date Value Ref Range Status   03/15/2018 4.0 3.4 - 5.3 mmol/L Final     Creatinine   Date Value Ref Range Status   03/15/2018 0.58 0.52 - 1.04 mg/dL Final     BP Readings from Last 3 Encounters:   06/08/18 114/52   05/01/18 130/70   04/19/18 119/58     Refilled per [facility] protocol.    Evelin Esqueda RN

## 2018-07-06 ENCOUNTER — MYC MEDICAL ADVICE (OUTPATIENT)
Dept: OPHTHALMOLOGY | Facility: CLINIC | Age: 83
End: 2018-07-06

## 2018-07-06 DIAGNOSIS — Z53.9 DIAGNOSIS NOT YET DEFINED: Primary | ICD-10-CM

## 2018-07-06 PROCEDURE — G0180 MD CERTIFICATION HHA PATIENT: HCPCS | Performed by: INTERNAL MEDICINE

## 2018-07-10 ENCOUNTER — DOCUMENTATION ONLY (OUTPATIENT)
Dept: CARE COORDINATION | Facility: CLINIC | Age: 83
End: 2018-07-10

## 2018-07-10 ENCOUNTER — MYC MEDICAL ADVICE (OUTPATIENT)
Dept: PEDIATRICS | Facility: CLINIC | Age: 83
End: 2018-07-10

## 2018-07-10 NOTE — PROGRESS NOTES
[facility] now requests orders and shares plan of care/discharge summaries for some patients through [employer].  Please REPLY TO THIS MESSAGE OR ROUTE BACK TO THE AUTHOR in order to give authorization for orders when needed.  This is considered a verbal order, you will still receive a faxed copy of orders for signature.  Thank you for your assistance in improving collaboration for our patients.    ORDER    SN to perform the following wound care to R forearm skin tear.  Cleanse with saline or wound cleanser and pat dry. Apply aquaphor, 2 layer adaptic and non adhesive foam dressing to wound base, secure with rolled gauze, use stockinette to hold in place, no tape to skin.  Change 2 to 3 times per week and prn.

## 2018-07-15 ENCOUNTER — MYC MEDICAL ADVICE (OUTPATIENT)
Dept: PEDIATRICS | Facility: CLINIC | Age: 83
End: 2018-07-15

## 2018-07-15 DIAGNOSIS — G25.81 RESTLESS LEG SYNDROME: ICD-10-CM

## 2018-07-16 ENCOUNTER — TELEPHONE (OUTPATIENT)
Dept: PEDIATRICS | Facility: CLINIC | Age: 83
End: 2018-07-16

## 2018-07-16 RX ORDER — GABAPENTIN 100 MG/1
200 CAPSULE ORAL DAILY
Qty: 180 CAPSULE | Refills: 3 | Status: SHIPPED | OUTPATIENT
Start: 2018-07-16

## 2018-07-16 NOTE — TELEPHONE ENCOUNTER
Routing refill request to provider for review/approval because:  Drug not on the [facility] refill protocol     gabapentin (NEURONTIN) 100 MG capsule 180 capsule 3 5/8/2017  No      Sig - Route: Take 2 capsules (200 mg) by mouth daily (late afternoon) - Oral       Last office visit:  6/8/18      Next 5 appointments (look out 90 days)     Sep 19, 2018  9:30 AM CDT   Return Visit with Candy Trujillo MD PhD   [facility]    [address] [place] [id number]   [phone]                Katie Prater RN,   . [facility]

## 2018-07-16 NOTE — TELEPHONE ENCOUNTER
Called SHREYAS Pal with [facility]. Gave verbal order for medication set up and faxed last 2 office visits and medication record.     Informed Dr. Trujillo.      Almaz Mclean RN, [facility]     Charge Rn Nida Espinosa) asked PES to call pt @ [employer] regarding his shoes which were left in the ER  [facility] @ 19:00 and spoke with Sharon Trujillo - pt is sleeping now and once awake, Sharon Trujillo will discuss with him and call PES back  [facility] called back and reported the pt is not cooperative and they are requesting we ship pt's shoes to them [address] [facility]  [address]  [address]  This note give to charge RN this shift to forward to 7 am shift RN tomorrow to take care of shipping pt's shoes

## 2018-07-16 NOTE — TELEPHONE ENCOUNTER
Health Call Center    Phone Message    May a detailed message be left on voicemail: yes    Reason for Call: Order(s): Home Care Orders: Other: [facility] home care is requesting RN visit every other week for MEt set up and manage. Kae is requesting last 2 clinic notes and medication list. Patient is scheduld on 07/18/18 for initial start of home care date. Please advise.  Other: Please call for verbal orders [phone] and fax notes to [phone]     Action Taken: Message routed to:  Primary Care p [phone]

## 2018-07-17 ENCOUNTER — MYC MEDICAL ADVICE (OUTPATIENT)
Dept: PEDIATRICS | Facility: CLINIC | Age: 83
End: 2018-07-17

## 2018-07-18 NOTE — TELEPHONE ENCOUNTER
Disp Refills Start End DOUGLAS   gabapentin (NEURONTIN) 100 MG capsule 180 capsule 3 7/16/2018  No   Sig - Route: Take 2 capsules (200 mg) by mouth daily (late afternoon) - Oral

## 2018-07-19 DIAGNOSIS — F41.8 DEPRESSION WITH ANXIETY: ICD-10-CM

## 2018-07-19 DIAGNOSIS — E78.5 HYPERLIPIDEMIA LDL GOAL <100: ICD-10-CM

## 2018-07-19 RX ORDER — PRAVASTATIN SODIUM 40 MG
TABLET ORAL
Qty: 45 TABLET | Refills: 0 | Status: SHIPPED | OUTPATIENT
Start: 2018-07-19 | End: 2018-10-04

## 2018-07-19 NOTE — TELEPHONE ENCOUNTER
Medication filled per RN refill protocol.      Katie Prater RN, [facility]         Next 5 appointments (look out 90 days)     Sep 19, 2018  9:30 AM CDT   Return Visit with Candy Trujillo MD PhD   [facility] ([facility]    [address] [place] [id number]   [phone]

## 2018-07-23 DIAGNOSIS — Z53.9 DIAGNOSIS NOT YET DEFINED: Primary | ICD-10-CM

## 2018-07-23 PROCEDURE — G0180 MD CERTIFICATION HHA PATIENT: HCPCS | Performed by: INTERNAL MEDICINE

## 2018-07-23 NOTE — TELEPHONE ENCOUNTER
Per telephone encounter dated 07/16/18. RN faxed medication list along with orders to [facility].  Rowena Dominguez CMA

## 2018-07-26 ENCOUNTER — TELEPHONE (OUTPATIENT)
Dept: PEDIATRICS | Facility: CLINIC | Age: 83
End: 2018-07-26

## 2018-07-26 NOTE — TELEPHONE ENCOUNTER
[facility] Care form:    Regarding major medication interactions.  Needs a response from a provider regarding recommendations/orders by phone or fax within 24-hours.  PCP is Dr. Trujillo.  Mariama Christopher recommends d/cing either doxepin or trazodone?      Form and message routed to Dr. Mitchell to review on 07/27/18.    Brie Galindo CMA

## 2018-07-27 NOTE — TELEPHONE ENCOUNTER
Dr. Arellano note faxed to [employer] @[phone].    Katie Prater RN,   [facility]

## 2018-07-27 NOTE — TELEPHONE ENCOUNTER
Information reviewed.  Reviewed recommendations form MTTERRENCE, Mariama- will d/c  TRAZODONE and continue with Doxepin.  Please update staff

## 2018-07-31 ENCOUNTER — OFFICE VISIT (OUTPATIENT)
Dept: OTOLARYNGOLOGY | Facility: CLINIC | Age: 83
End: 2018-07-31
Payer: COMMERCIAL

## 2018-07-31 VITALS
WEIGHT: 117 LBS | HEIGHT: 60 IN | BODY MASS INDEX: 22.97 KG/M2 | HEART RATE: 60 BPM | SYSTOLIC BLOOD PRESSURE: 128 MMHG | DIASTOLIC BLOOD PRESSURE: 70 MMHG

## 2018-07-31 DIAGNOSIS — H61.23 EXCESSIVE EAR WAX, BILATERAL: Primary | ICD-10-CM

## 2018-07-31 PROCEDURE — 99212 OFFICE O/P EST SF 10 MIN: CPT | Performed by: OTOLARYNGOLOGY

## 2018-07-31 NOTE — LETTER
7/31/2018         RE: Nancy Benz  [address] [address] [place] [address]        Dear Colleague,    Thank you for referring your patient, Nancy Benz, to the [facility]. Please see a copy of my visit note below.    HPI    This pleasant patient is here for ear wax removal. Denies any ear drainage, pain, or vertigo.    Review of Systems   Constitutional: Negative.    HENT: Positive for hearing loss. Negative for ear discharge, ear pain and nosebleeds.    Eyes: Negative for blurred vision and double vision.   Gastrointestinal: Negative for heartburn, nausea and vomiting.   Skin: Negative.    Neurological: Negative for headaches.   Endo/Heme/Allergies: Negative for environmental allergies. Does not bruise/bleed easily.         Physical Exam   Constitutional: She is well-developed, well-nourished, and in no distress.   HENT:   Head: Normocephalic and atraumatic.   Right Ear: Tympanic membrane normal. No drainage, swelling or tenderness. No middle ear effusion. Decreased hearing is noted.   Left Ear: Tympanic membrane normal. No drainage, swelling or tenderness.  No middle ear effusion. Decreased hearing is noted.   Nose: Nose normal.   Mouth/Throat: Oropharynx is clear and moist. No oropharyngeal exudate.       Ear wax removal: On both ear canals, ear wax blocked the canal more than 90%. By suctioning and with an alligator, ear wax was removed. Ear drums were seen as normal. The patient tolerated the procedure well and left the room with no complications.    F/u as needed.    Again, thank you for allowing me to participate in the care of your patient.        Sincerely,        Sarahi Hernandez MD

## 2018-07-31 NOTE — NURSING NOTE
Nancy Benz's goals for this visit include:   Chief Complaint   Patient presents with     RECHECK     Ear cleaning       She requests these members of her care team be copied on today's visit information: yes      PCP: Candy Trujillo    Referring Provider:  Referred Self, MD  No address on file    /70  Pulse 60  Ht 1.524 m (5')  Wt 53.1 kg (117 lb)  BMI 22.85 kg/m2    Lucille Peña CMA ([facility])

## 2018-07-31 NOTE — PROGRESS NOTES
HPI    This pleasant patient is here for ear wax removal. Denies any ear drainage, pain, or vertigo.    Review of Systems   Constitutional: Negative.    HENT: Positive for hearing loss. Negative for ear discharge, ear pain and nosebleeds.    Eyes: Negative for blurred vision and double vision.   Gastrointestinal: Negative for heartburn, nausea and vomiting.   Skin: Negative.    Neurological: Negative for headaches.   Endo/Heme/Allergies: Negative for environmental allergies. Does not bruise/bleed easily.         Physical Exam   Constitutional: She is well-developed, well-nourished, and in no distress.   HENT:   Head: Normocephalic and atraumatic.   Right Ear: Tympanic membrane normal. No drainage, swelling or tenderness. No middle ear effusion. Decreased hearing is noted.   Left Ear: Tympanic membrane normal. No drainage, swelling or tenderness.  No middle ear effusion. Decreased hearing is noted.   Nose: Nose normal.   Mouth/Throat: Oropharynx is clear and moist. No oropharyngeal exudate.       Ear wax removal: On both ear canals, ear wax blocked the canal more than 90%. By suctioning and with an alligator, ear wax was removed. Ear drums were seen as normal. The patient tolerated the procedure well and left the room with no complications.    F/u as needed.

## 2018-07-31 NOTE — MR AVS SNAPSHOT
After Visit Summary   7/31/2018    Nancy Benz    MRN: [id number]           Patient Information     Date Of Birth          8/5/1922        Visit Information        Provider Department      7/31/2018 1:30 PM Sarahi Hernandez MD [facility]         Follow-ups after your visit        Your next 10 appointments already scheduled     Sep 19, 2018  9:00 AM CDT   LAB with LAB FIRST FLOOR Critical access hospital ([facility])    [id number] [facility] [address] [phone]           Please do not eat 10-12 hours before your appointment if you are coming in fasting for labs on lipids, cholesterol, or glucose (sugar). This does not apply to pregnant women. Water, hot tea and black coffee (with nothing added) are okay. Do not drink other fluids, diet soda or chew gum.            Sep 19, 2018  9:30 AM CDT   Return Visit with Candy Trujillo MD PhD   [facility] ([facility])    [id number] [facility] [address] [phone]            Nov 06, 2018 10:30 AM CST   Return Visit with Lee Reyes MD, [facility] NURSE ONLY   [facility] ([facility])    [id number] [facility] [id number] [phone]            Jan 29, 2019  1:00 PM CST   Return Visit with Sarahi Hernandez MD   [facility] ([facility])    [id number] [facility] [id number] [phone]            Jun 20, 2019  2:00 PM CDT   Return Visit with Sobia Terrazas MD   [facility])    [id number] [facility] [address] [phone]              Who to contact     If you have questions or need follow up information about today's clinic visit or your schedule please contact [facility] directly at [phone].  Normal or non-critical lab and imaging results will be  communicated to you by [employer], letter or phone within 4 business days after the clinic has received the results. If you do not hear from us within 7 days, please contact the clinic through [employer] or phone. If you have a critical or abnormal lab result, we will notify you by phone as soon as possible.  Submit refill requests through [employer] or call your pharmacy and they will forward the refill request to us. Please allow 3 business days for your refill to be completed.          Additional Information About Your Visit        [employer] Information     [employer] gives you secure access to your electronic health record. If you see a primary care provider, you can also send messages to your care team and make appointments. If you have questions, please call your primary care clinic.  If you do not have a primary care provider, please call [phone] and they will assist you.      [employer] is an electronic gateway that provides easy, online access to your medical records. With [employer], you can request a clinic appointment, read your test results, renew a prescription or communicate with your care team.     To access your existing account, please contact your [facility] or call [phone] for assistance.        Care EveryWhere ID     This is your Care EveryWhere ID. This could be used by other organizations to access your [place] medical records  [id number]        Your Vitals Were     Pulse Height BMI (Body Mass Index)             60 1.524 m (5') 22.85 kg/m2          Blood Pressure from Last 3 Encounters:   07/31/18 128/70   06/08/18 114/52   05/01/18 130/70    Weight from Last 3 Encounters:   07/31/18 53.1 kg (117 lb)   06/08/18 54.3 kg (119 lb 9.6 oz)   05/01/18 55.2 kg (121 lb 12.8 oz)              Today, you had the following     No orders found for display       Primary Care Provider Office Phone # Fax #    Candy Trujillo MD PhD [phone] [address]  [facility] [address]        Equal Access to Services     [facility]: Hadii percy coleman haddavidson Sokevin, waaxda luqadaha, qaybta kaalmada radhaclaudiogiuseppe, waxling socorro fraserotonielfrancisca topete. So [facility] [phone].    ATENCIÓN: Si habla español, tiene a crandall disposición servicios gratuitos de asistencia lingüística. Mark al [phone].    We comply with applicable federal civil rights laws and [place] laws. We do not discriminate on the basis of race, color, national origin, age, disability, sex, sexual orientation, or gender identity.            Thank you!     Thank you for choosing [facility]  for your care. Our goal is always to provide you with excellent care. Hearing back from our patients is one way we can continue to improve our services. Please take a few minutes to complete the written survey that you may receive in the mail after your visit with us. Thank you!             Your Updated Medication List - Protect others around you: Learn how to safely use, store and throw away your medicines at www.disposemymeds.org.          This list is accurate as of 7/31/18  1:37 PM.  Always use your most recent med list.                   Brand Name Dispense Instructions for use Diagnosis    * albuterol (2.5 MG/3ML) 0.083% neb solution      Take 1 vial by nebulization every 6 hours as needed for shortness of breath / dyspnea or wheezing        * albuterol 108 (90 Base) MCG/ACT Inhaler    PROAIR HFA/PROVENTIL HFA/VENTOLIN HFA    3 Inhaler    Inhale 2 puffs into the lungs every 4 hours as needed for shortness of breath / dyspnea or wheezing    Moderate persistent asthma with exacerbation       bimatoprost 0.01 % Soln    LUMIGAN    3 Bottle    Place 1 drop into both eyes At Bedtime    Primary open angle glaucoma of both eyes, unspecified glaucoma stage       brimonidine-timolol 0.2-0.5 % ophthalmic solution    COMBIGAN    10 mL    Place 1 drop into both eyes 2 times daily    Primary open angle glaucoma of both  eyes, moderate stage       CALCIUM 600 + D 600-200 MG-UNIT Tabs   Generic drug:  Calcium Carb-Cholecalciferol      Take 1 tablet by mouth daily Vitamin N=8745 iu    Keratoconjunctivitis sicca, not specified as Sjogren's, left, Glaucoma       cycloSPORINE 0.05 % ophthalmic emulsion    RESTASIS    1 Box    Place 1 drop into both eyes every 12 hours    Keratoconjunctivitis sicca, not specified as Sjogren's, left       diltiazem 120 MG 24 hr capsule    DILT-XR    90 capsule    TAKE 1 CAPSULE EVERY DAY    Essential hypertension with goal blood pressure less than 140/90       doxepin 10 MG/ML (HIGH CONC) solution    SINEquan    118 mL    Take 0.3 mLs (3 mg) by mouth At Bedtime    Chronic insomnia       FLUoxetine 20 MG capsule    PROzac    90 capsule    Take 1 capsule (20 mg) by mouth daily    Depression with anxiety       fluticasone 220 MCG/ACT Inhaler    FLOVENT HFA    36 g    Inhale 2 puffs into the lungs 2 times daily    Simple chronic bronchitis (H)       gabapentin 100 MG capsule    NEURONTIN    180 capsule    Take 2 capsules (200 mg) by mouth daily (late afternoon)    Restless leg syndrome       hydrocortisone 2.5 % ointment     30 g    Apply topically 2 times daily Apply twice per day up to 1 week, take week off .    Dermatitis       ipratropium - albuterol 0.5 mg/2.5 mg/3 mL 0.5-2.5 (3) MG/3ML    DUONEB     Take 1 vial by nebulization every 6 hours as needed for shortness of breath / dyspnea or wheezing        LORazepam 0.5 MG tablet    ATIVAN    4 tablet    Take 0.5-1 tablets (0.25-0.5 mg) by mouth once as needed for anxiety (take 30 minutes prior to MRI) Take 30 minutes prior to departure.  Do not operate a vehicle after taking this medication    Claustrophobia       pantoprazole 20 MG EC tablet    PROTONIX    90 tablet    Take by mouth 30-60 minutes before a meal.    Peptic ulcer       pravastatin 40 MG tablet    PRAVACHOL    45 tablet    TAKE 1/2 TABLET EVERY DAY    Hyperlipidemia LDL goal <100        Respiratory Therapy Supplies Deja     1 each    Optichamber or equivalent to use with spacer.    Moderate persistent asthma without complication       senna-docusate 8.6-50 MG per tablet    SENOKOT-S;PERICOLACE     Take 1 tablet by mouth daily        SYSTANE 0.4-0.3 % Soln ophthalmic solution   Generic drug:  polyethylene glycol 0.4%- propylene glycol 0.3%      Place 1 drop into both eyes 3 times daily as needed for dry eyes    Keratoconjunctivitis sicca, not specified as Sjogren's, left, Glaucoma       tacrolimus 0.03 % ointment    PROTOPIC    30 g    Apply twice daily to red areas around the eyes for 4 weeks.    Dermatitis       traZODone 50 MG tablet    DESYREL    30 tablet    Take 0.5-1 tablets (25-50 mg) by mouth nightly as needed for sleep    Insomnia due to medical condition       * Notice:  This list has 2 medication(s) that are the same as other medications prescribed for you. Read the directions carefully, and ask your doctor or other care provider to review them with you.

## 2018-08-03 ENCOUNTER — MEDICAL CORRESPONDENCE (OUTPATIENT)
Dept: HEALTH INFORMATION MANAGEMENT | Facility: CLINIC | Age: 83
End: 2018-08-03

## 2018-08-22 ENCOUNTER — OFFICE VISIT (OUTPATIENT)
Dept: ORTHOPEDICS | Facility: CLINIC | Age: 83
End: 2018-08-22
Payer: COMMERCIAL

## 2018-08-22 VITALS — HEART RATE: 61 BPM | DIASTOLIC BLOOD PRESSURE: 64 MMHG | OXYGEN SATURATION: 95 % | SYSTOLIC BLOOD PRESSURE: 134 MMHG

## 2018-08-22 DIAGNOSIS — M48.061 SPINAL STENOSIS OF LUMBAR REGION, UNSPECIFIED WHETHER NEUROGENIC CLAUDICATION PRESENT: ICD-10-CM

## 2018-08-22 DIAGNOSIS — M51.379 DEGENERATION OF LUMBAR OR LUMBOSACRAL INTERVERTEBRAL DISC: Primary | ICD-10-CM

## 2018-08-22 PROCEDURE — 99213 OFFICE O/P EST LOW 20 MIN: CPT | Performed by: PREVENTIVE MEDICINE

## 2018-08-22 ASSESSMENT — PAIN SCALES - GENERAL: PAINLEVEL: SEVERE PAIN (6)

## 2018-08-22 NOTE — LETTER
8/22/2018         RE: Nancy Benz  [address] [address] [place] [address]        Dear Colleague,    Thank you for referring your patient, Nancy Benz, to the [facility]. Please see a copy of my visit note below.    HISTORY OF PRESENT ILLNESS  Ms. Benz is a pleasant 89 year old year old female who presents to clinic today for followup for low back pain. She had ANA about 3.5 months prior that was helpful for several months, and now she has started getting pain in her low back again  She gets pain when she stands for longer periods of time while she is working around the house, the pain is in the low back, and she has no radiation of pain into her legs  MEDICAL HISTORY  Patient Active Problem List   Diagnosis     Glaucoma     Keratoconjunctivitis sicca, not specified as Sjogren's, left     Cataract     Pseudophakia of both eyes     Presbyopia     Monoclonal gammopathy     Nonrheumatic aortic valve insufficiency     Dizziness     History of vertebral fracture     Nocturnal oxygen desaturation     Urgency incontinence     Hyperlipidemia LDL goal <130     Depression with anxiety     Restless leg syndrome     Mild persistent asthma without complication     ACP (advance care planning)     Chronic constipation     Prediabetes     Simple chronic bronchitis (H)     Senile osteoporosis     Essential hypertension       Current Outpatient Prescriptions   Medication Sig Dispense Refill     albuterol (2.5 MG/3ML) 0.083% neb solution Take 1 vial by nebulization every 6 hours as needed for shortness of breath / dyspnea or wheezing       albuterol (PROAIR HFA/PROVENTIL HFA/VENTOLIN HFA) 108 (90 BASE) MCG/ACT Inhaler Inhale 2 puffs into the lungs every 4 hours as needed for shortness of breath / dyspnea or wheezing 3 Inhaler 3     bimatoprost (LUMIGAN) 0.01 % SOLN Place 1 drop into both eyes At Bedtime 3 Bottle 11     brimonidine-timolol (COMBIGAN) 0.2-0.5 % ophthalmic solution Place 1  drop into both eyes 2 times daily 10 mL 11     Calcium Carb-Cholecalciferol (CALCIUM 600 + D) 600-200 MG-UNIT TABS Take 1 tablet by mouth daily Vitamin F=8028 iu       cycloSPORINE (RESTASIS) 0.05 % ophthalmic emulsion Place 1 drop into both eyes every 12 hours 1 Box 11     diltiazem (DILT-XR) 120 MG 24 hr capsule TAKE 1 CAPSULE EVERY DAY 90 capsule 1     doxepin (SINEQUAN) 10 MG/ML (HIGH CONC) solution Take 0.3 mLs (3 mg) by mouth At Bedtime 118 mL 0     FLUoxetine (PROZAC) 20 MG capsule Take 1 capsule (20 mg) by mouth daily 90 capsule 0     fluticasone (FLOVENT HFA) 220 MCG/ACT Inhaler Inhale 2 puffs into the lungs 2 times daily 36 g 3     gabapentin (NEURONTIN) 100 MG capsule Take 2 capsules (200 mg) by mouth daily (late afternoon) 180 capsule 3     hydrocortisone 2.5 % ointment Apply topically 2 times daily Apply twice per day up to 1 week, take week off . 30 g 0     ipratropium - albuterol 0.5 mg/2.5 mg/3 mL (DUONEB) 0.5-2.5 (3) MG/3ML Take 1 vial by nebulization every 6 hours as needed for shortness of breath / dyspnea or wheezing       LORazepam (ATIVAN) 0.5 MG tablet Take 0.5-1 tablets (0.25-0.5 mg) by mouth once as needed for anxiety (take 30 minutes prior to MRI) Take 30 minutes prior to departure.  Do not operate a vehicle after taking this medication 4 tablet 0     polyethylene glycol 0.4%- propylene glycol 0.3% (SYSTANE) 0.4-0.3 % SOLN ophthalmic solution Place 1 drop into both eyes 3 times daily as needed for dry eyes       pravastatin (PRAVACHOL) 40 MG tablet TAKE 1/2 TABLET EVERY DAY 45 tablet 0     Respiratory Therapy Supplies [facility] or equivalent to use with spacer. 1 each 0     senna-docusate (SENOKOT-S;PERICOLACE) 8.6-50 MG per tablet Take 1 tablet by mouth daily       tacrolimus (PROTOPIC) 0.03 % ointment Apply twice daily to red areas around the eyes for 4 weeks. 30 g 0     traZODone (DESYREL) 50 MG tablet Take 0.5-1 tablets (25-50 mg) by mouth nightly as needed for sleep 30 tablet 1      pantoprazole (PROTONIX) 20 MG EC tablet Take by mouth 30-60 minutes before a meal. (Patient not taking: Reported on 7/31/2018) 90 tablet 3       Allergies   Allergen Reactions     Hydrocodone-Acetaminophen Other (See Comments)     Nausea, dizzy, felt awful     Pilocarpine      Timolol Difficulty breathing     Makes asthma worse    Oral tabs.. ophth sol does not cause a problem       Family History   Problem Relation Age of Onset     Glaucoma Son      Glaucoma Daughter        Additional medical/Social/Surgical histories reviewed in [facility] and updated as appropriate.     REVIEW OF SYSTEMS (8/22/2018)  10 point ROS of systems including Constitutional, Eyes, Respiratory, Cardiovascular, Gastroenterology, Genitourinary, Integumentary, Musculoskeletal, Psychiatric were all negative except for pertinent positives noted in my HPI.     PHYSICAL EXAM  Vitals:    08/22/18 1258   BP: 134/64   BP Location: Left arm   Patient Position: Sitting   Cuff Size: Adult Large   Pulse: 61   SpO2: 95%     Vital Signs: /64 (BP Location: Left arm, Patient Position: Sitting, Cuff Size: Adult Large)  Pulse 61  SpO2 95% Patient declined being weighed. There is no height or weight on file to calculate BMI.    General  - normal appearance, in no obvious distress  CV  - normal peripheral perfusion  Pulm  - normal respiratory pattern, non-labored  Musculoskeletal - lumbar spine  - stance: normal gait without limp, no obvious leg length discrepancy, normal heel and toe walk  - inspection: normal bone and joint alignment, no obvious scoliosis  - palpation: no paravertebral or bony tenderness  - ROM: flexion exacerbates some low back pain, normal extension, sidebending, rotation  - strength: lower extremities 5/5 in all planes  - special tests:  (+) straight leg raise  (+) slump test  Neuro  - patellar and Achilles DTRs 2+ bilaterally, no lower extremity sensory deficit throughout L5 distribution, grossly normal coordination, normal muscle  tone  Skin  - no ecchymosis, erythema, warmth, or induration, no obvious rash  Psych  - interactive, appropriate, normal mood and affect  ASSESSMENT & PLAN  89 yo female with lumbar ddd, spinal stenosis, not resolved  Reviewed her lumbar MRI, and previous ANA, and ordered another ANA  Cont. HEP  F/u as needed  OK to CALL IN THE FUTURE AND REQUEST AN ANA EVERY 3-4 MONTHS     Norris Fletcher MD, [facility]    Again, thank you for allowing me to participate in the care of your patient.        Sincerely,        Norris Fletcher MD

## 2018-08-22 NOTE — PATIENT INSTRUCTIONS
Thanks for coming today.  Ortho/Sports Medicine Clinic  [address] [place] [address]    To schedule future appointments in Ortho Clinic, you may call [phone].    To schedule ordered imaging by your provider:   Call [facility] [phone]    To schedule an injection ordered by your provider:  Call [facility] Injection scheduling line: [phone]  [employer] available online at:  [email or web address]    Please call if any further questions or concerns ([phone]).  Clinic hours 8 am to 5 pm.    Return to clinic (call) if symptoms worsen or fail to improve.

## 2018-08-22 NOTE — PROGRESS NOTES
HISTORY OF PRESENT ILLNESS  Ms. Benz is a pleasant 89 year old year old female who presents to clinic today for followup for low back pain. She had ANA about 3.5 months prior that was helpful for several months, and now she has started getting pain in her low back again  She gets pain when she stands for longer periods of time while she is working around the house, the pain is in the low back, and she has no radiation of pain into her legs  MEDICAL HISTORY  Patient Active Problem List   Diagnosis     Glaucoma     Keratoconjunctivitis sicca, not specified as Sjogren's, left     Cataract     Pseudophakia of both eyes     Presbyopia     Monoclonal gammopathy     Nonrheumatic aortic valve insufficiency     Dizziness     History of vertebral fracture     Nocturnal oxygen desaturation     Urgency incontinence     Hyperlipidemia LDL goal <130     Depression with anxiety     Restless leg syndrome     Mild persistent asthma without complication     ACP (advance care planning)     Chronic constipation     Prediabetes     Simple chronic bronchitis (H)     Senile osteoporosis     Essential hypertension       Current Outpatient Prescriptions   Medication Sig Dispense Refill     albuterol (2.5 MG/3ML) 0.083% neb solution Take 1 vial by nebulization every 6 hours as needed for shortness of breath / dyspnea or wheezing       albuterol (PROAIR HFA/PROVENTIL HFA/VENTOLIN HFA) 108 (90 BASE) MCG/ACT Inhaler Inhale 2 puffs into the lungs every 4 hours as needed for shortness of breath / dyspnea or wheezing 3 Inhaler 3     bimatoprost (LUMIGAN) 0.01 % SOLN Place 1 drop into both eyes At Bedtime 3 Bottle 11     brimonidine-timolol (COMBIGAN) 0.2-0.5 % ophthalmic solution Place 1 drop into both eyes 2 times daily 10 mL 11     Calcium Carb-Cholecalciferol (CALCIUM 600 + D) 600-200 MG-UNIT TABS Take 1 tablet by mouth daily Vitamin I=4391 iu       cycloSPORINE (RESTASIS) 0.05 % ophthalmic emulsion Place 1 drop into both eyes every 12 hours  1 Box 11     diltiazem (DILT-XR) 120 MG 24 hr capsule TAKE 1 CAPSULE EVERY DAY 90 capsule 1     doxepin (SINEQUAN) 10 MG/ML (HIGH CONC) solution Take 0.3 mLs (3 mg) by mouth At Bedtime 118 mL 0     FLUoxetine (PROZAC) 20 MG capsule Take 1 capsule (20 mg) by mouth daily 90 capsule 0     fluticasone (FLOVENT HFA) 220 MCG/ACT Inhaler Inhale 2 puffs into the lungs 2 times daily 36 g 3     gabapentin (NEURONTIN) 100 MG capsule Take 2 capsules (200 mg) by mouth daily (late afternoon) 180 capsule 3     hydrocortisone 2.5 % ointment Apply topically 2 times daily Apply twice per day up to 1 week, take week off . 30 g 0     ipratropium - albuterol 0.5 mg/2.5 mg/3 mL (DUONEB) 0.5-2.5 (3) MG/3ML Take 1 vial by nebulization every 6 hours as needed for shortness of breath / dyspnea or wheezing       LORazepam (ATIVAN) 0.5 MG tablet Take 0.5-1 tablets (0.25-0.5 mg) by mouth once as needed for anxiety (take 30 minutes prior to MRI) Take 30 minutes prior to departure.  Do not operate a vehicle after taking this medication 4 tablet 0     polyethylene glycol 0.4%- propylene glycol 0.3% (SYSTANE) 0.4-0.3 % SOLN ophthalmic solution Place 1 drop into both eyes 3 times daily as needed for dry eyes       pravastatin (PRAVACHOL) 40 MG tablet TAKE 1/2 TABLET EVERY DAY 45 tablet 0     Respiratory Therapy Supplies ROBERTO [facility] or equivalent to use with spacer. 1 each 0     senna-docusate (SENOKOT-S;PERICOLACE) 8.6-50 MG per tablet Take 1 tablet by mouth daily       tacrolimus (PROTOPIC) 0.03 % ointment Apply twice daily to red areas around the eyes for 4 weeks. 30 g 0     traZODone (DESYREL) 50 MG tablet Take 0.5-1 tablets (25-50 mg) by mouth nightly as needed for sleep 30 tablet 1     pantoprazole (PROTONIX) 20 MG EC tablet Take by mouth 30-60 minutes before a meal. (Patient not taking: Reported on 7/31/2018) 90 tablet 3       Allergies   Allergen Reactions     Hydrocodone-Acetaminophen Other (See Comments)     Nausea, dizzy, felt awful      Pilocarpine      Timolol Difficulty breathing     Makes asthma worse    Oral tabs.. ophth sol does not cause a problem       Family History   Problem Relation Age of Onset     Glaucoma Son      Glaucoma Daughter        Additional medical/Social/Surgical histories reviewed in [facility] and updated as appropriate.     REVIEW OF SYSTEMS (8/22/2018)  10 point ROS of systems including Constitutional, Eyes, Respiratory, Cardiovascular, Gastroenterology, Genitourinary, Integumentary, Musculoskeletal, Psychiatric were all negative except for pertinent positives noted in my HPI.     PHYSICAL EXAM  Vitals:    08/22/18 1258   BP: 134/64   BP Location: Left arm   Patient Position: Sitting   Cuff Size: Adult Large   Pulse: 61   SpO2: 95%     Vital Signs: /64 (BP Location: Left arm, Patient Position: Sitting, Cuff Size: Adult Large)  Pulse 61  SpO2 95% Patient declined being weighed. There is no height or weight on file to calculate BMI.    General  - normal appearance, in no obvious distress  CV  - normal peripheral perfusion  Pulm  - normal respiratory pattern, non-labored  Musculoskeletal - lumbar spine  - stance: normal gait without limp, no obvious leg length discrepancy, normal heel and toe walk  - inspection: normal bone and joint alignment, no obvious scoliosis  - palpation: no paravertebral or bony tenderness  - ROM: flexion exacerbates some low back pain, normal extension, sidebending, rotation  - strength: lower extremities 5/5 in all planes  - special tests:  (+) straight leg raise  (+) slump test  Neuro  - patellar and Achilles DTRs 2+ bilaterally, no lower extremity sensory deficit throughout L5 distribution, grossly normal coordination, normal muscle tone  Skin  - no ecchymosis, erythema, warmth, or induration, no obvious rash  Psych  - interactive, appropriate, normal mood and affect  ASSESSMENT & PLAN  89 yo female with lumbar ddd, spinal stenosis, not resolved  Reviewed her lumbar MRI, and previous ANA,  and ordered another ANA  Cont. HEP  F/u as needed  OK to CALL IN THE FUTURE AND REQUEST AN ANA EVERY 3-4 MONTHS     Norris Fletcher MD, CAQSM

## 2018-08-22 NOTE — NURSING NOTE
Nancy Benz's chief complaint for this visit includes:  Chief Complaint   Patient presents with     RECHECK     follow up to discuss another injection     PCP: Candy Trujillo    Referring Provider:  No referring provider defined for this encounter.    /64 (BP Location: Left arm, Patient Position: Sitting, Cuff Size: Adult Large)  Pulse 61  SpO2 95%  Severe Pain (6)     Do you need any medication refills at today's visit? No

## 2018-08-22 NOTE — MR AVS SNAPSHOT
After Visit Summary   2018    Nancy Benz    MRN: [id number]           Patient Information     Date Of Birth          1922        Visit Information        Provider Department      2018 1:00 PM Norris Fletcher MD [facility]        Today's Diagnoses     Degeneration of lumbar or lumbosacral intervertebral disc    -  1    Spinal stenosis of lumbar region, unspecified whether neurogenic claudication present          Care Instructions          Thanks for coming today.  [facility]  [address] [place] [id number]    To schedule future appointments in Ortho Clinic, you may call [phone].    To schedule ordered imaging by your provider:   Call Central Imaging [phone]    To schedule an injection ordered by your provider:  Call Central Imaging Injection scheduling line: [phone]  MyChart available online at:  [email or web address] Mediahart    Please call if any further questions or concerns ([phone]).  Clinic hours 8 am to 5 pm.    Return to clinic (call) if symptoms worsen or fail to improve.            Follow-ups after your visit        Your next 10 appointments already scheduled     Aug 27, 2018  1:30 PM CDT   TELEMEDICINE with Almaz Chrisotpher RANDI   [facility] ([facility])    [address] [place] [address] [phone]           Note: this is not an onsite visit; there is no need to come to the facility.            Sep 04, 2018  1:00 PM CDT   (Arrive by 12:45 PM)   XR LUMBAR EPIDURAL INJECTION with MGXR3, MG NEURO RAD   [facility] ([facility])    [address] [place] [address] [facility] [address] [phone]           Please bring any scans or X-rays taken at other hospitals, if similar tests were done. Also bring a list of your medicines, including vitamins, minerals and over-the-counter drugs. It is  safest to leave personal items at home.  Injections take about 30 to 45 minutes. Most people spend up to 2 hours in the clinic or hospital.  Plan to have someone drive you home afterward.  Tell your doctor in advance:   If you are allergic to X-ray dye (contrast fluid).   If you may be pregnant.   If you take Coumadin (or any other blood thinners) you may need to stop taking them up to 5 days prior to the exam. Talk to your doctor before stopping.   If you take Plavix, Ticlid, Pletal or Persantine, please ask your doctor if you should stop these medicines. You may need extra tests on the morning of your scan.   If you take Xarelto (Rivaroxaban), you may need to stop taking it 24 hours before treatment. Talk to your doctor before stopping this medicine.   If you have any questions, please call the Imaging Department where you will have your exam. Directions, parking instructions, and other information are available on our website, [email or web address].            Sep 19, 2018  9:00 AM CDT   LAB with LAB FIRST FLOOR [facility])    [address] [place] [address]   [phone]           Please do not eat 10-12 hours before your appointment if you are coming in fasting for labs on lipids, cholesterol, or glucose (sugar). This does not apply to pregnant women. Water, hot tea and black coffee (with nothing added) are okay. Do not drink other fluids, diet soda or chew gum.            Sep 19, 2018  9:30 AM CDT   Return Visit with Candy Trujillo MD PhD   [facility])    [address] [place] [address]   [phone]            Nov 06, 2018 10:30 AM CST   Return Visit with Lee Reyes MD, [facility] NURSE ONLY   [facility])    [address] [place] [address]   [phone]            Jun 20, 2019  2:00 PM CDT   Return Visit with  Sobia Terrazas MD   [facility] ([facility])    [address] [place] [address] [phone]              Future tests that were ordered for you today     Open Future Orders        Priority Expected Expires Ordered    X-ray Lumbar epidural injection Routine 8/22/2018 8/22/2019 8/22/2018            Who to contact     If you have questions or need follow up information about today's clinic visit or your schedule please contact [facility] directly at [phone].  Normal or non-critical lab and imaging results will be communicated to you by [employer], letter or phone within 4 business days after the clinic has received the results. If you do not hear from us within 7 days, please contact the clinic through [employer] or phone. If you have a critical or abnormal lab result, we will notify you by phone as soon as possible.  Submit refill requests through [employer] or call your pharmacy and they will forward the refill request to us. Please allow 3 business days for your refill to be completed.          Additional Information About Your Visit        H[employer] Information     [employer] gives you secure access to your electronic health record. If you see a primary care provider, you can also send messages to your care team and make appointments. If you have questions, please call your primary care clinic.  If you do not have a primary care provider, please call [phone] and they will assist you.      [employer] is an electronic gateway that provides easy, online access to your medical records. With [employer], you can request a clinic appointment, read your test results, renew a prescription or communicate with your care team.     To access your existing account, please contact your [facility] or call [phone] for assistance.        Care EveryWhere ID     This is your Care EveryWhere ID. This could be used by other organizations to  access your [facility] medical records  [id number]        Your Vitals Were     Pulse Pulse Oximetry                61 95%           Blood Pressure from Last 3 Encounters:   08/22/18 134/64   07/31/18 128/70   06/08/18 114/52    Weight from Last 3 Encounters:   07/31/18 53.1 kg (117 lb)   06/08/18 54.3 kg (119 lb 9.6 oz)   05/01/18 55.2 kg (121 lb 12.8 oz)               Primary Care Provider Office Phone # Fax #    Candy Trujillo MD PhD [phone]       [address]  [facility] [address]        Equal Access to Services     CUATE [facility] CenterDIANA : Hadii percy bowles Sokevin, waaxda luqadaha, qaybta kaalmada adeirajda, clark topete. So [facility] [phone].    ATENCIÓN: Si habla español, tiene a crandall disposición servicios gratuitos de asistencia lingüística. Llame al [phone].    We comply with applicable federal civil rights laws and [place] laws. We do not discriminate on the basis of race, color, national origin, age, disability, sex, sexual orientation, or gender identity.            Thank you!     Thank you for choosing [facility]  for your care. Our goal is always to provide you with excellent care. Hearing back from our patients is one way we can continue to improve our services. Please take a few minutes to complete the written survey that you may receive in the mail after your visit with us. Thank you!             Your Updated Medication List - Protect others around you: Learn how to safely use, store and throw away your medicines at www.disposemymeds.org.          This list is accurate as of 8/22/18  1:34 PM.  Always use your most recent med list.                   Brand Name Dispense Instructions for use Diagnosis    * albuterol (2.5 MG/3ML) 0.083% neb solution      Take 1 vial by nebulization every 6 hours as needed for shortness of breath / dyspnea or wheezing        * albuterol 108 (90 Base) MCG/ACT inhaler    PROAIR HFA/PROVENTIL HFA/VENTOLIN HFA     3 Inhaler    Inhale 2 puffs into the lungs every 4 hours as needed for shortness of breath / dyspnea or wheezing    Moderate persistent asthma with exacerbation       bimatoprost 0.01 % Soln    LUMIGAN    3 Bottle    Place 1 drop into both eyes At Bedtime    Primary open angle glaucoma of both eyes, unspecified glaucoma stage       brimonidine-timolol 0.2-0.5 % ophthalmic solution    COMBIGAN    10 mL    Place 1 drop into both eyes 2 times daily    Primary open angle glaucoma of both eyes, moderate stage       CALCIUM 600 + D 600-200 MG-UNIT Tabs   Generic drug:  Calcium Carb-Cholecalciferol      Take 1 tablet by mouth daily Vitamin N=8039 iu    Keratoconjunctivitis sicca, not specified as Sjogren's, left, Glaucoma       cycloSPORINE 0.05 % ophthalmic emulsion    RESTASIS    1 Box    Place 1 drop into both eyes every 12 hours    Keratoconjunctivitis sicca, not specified as Sjogren's, left       diltiazem 120 MG 24 hr capsule    DILT-XR    90 capsule    TAKE 1 CAPSULE EVERY DAY    Essential hypertension with goal blood pressure less than 140/90       doxepin 10 MG/ML (HIGH CONC) solution    SINEquan    118 mL    Take 0.3 mLs (3 mg) by mouth At Bedtime    Chronic insomnia       FLUoxetine 20 MG capsule    PROzac    90 capsule    Take 1 capsule (20 mg) by mouth daily    Depression with anxiety       fluticasone 220 MCG/ACT Inhaler    FLOVENT HFA    36 g    Inhale 2 puffs into the lungs 2 times daily    Simple chronic bronchitis (H)       gabapentin 100 MG capsule    NEURONTIN    180 capsule    Take 2 capsules (200 mg) by mouth daily (late afternoon)    Restless leg syndrome       hydrocortisone 2.5 % ointment     30 g    Apply topically 2 times daily Apply twice per day up to 1 week, take week off .    Dermatitis       ipratropium - albuterol 0.5 mg/2.5 mg/3 mL 0.5-2.5 (3) MG/3ML    DUONEB     Take 1 vial by nebulization every 6 hours as needed for shortness of breath / dyspnea or wheezing        LORazepam 0.5 MG  tablet    ATIVAN    4 tablet    Take 0.5-1 tablets (0.25-0.5 mg) by mouth once as needed for anxiety (take 30 minutes prior to MRI) Take 30 minutes prior to departure.  Do not operate a vehicle after taking this medication    Claustrophobia       pantoprazole 20 MG EC tablet    PROTONIX    90 tablet    Take by mouth 30-60 minutes before a meal.    Peptic ulcer       pravastatin 40 MG tablet    PRAVACHOL    45 tablet    TAKE 1/2 TABLET EVERY DAY    Hyperlipidemia LDL goal <100       Respiratory Therapy Supplies Deja     1 each    Optichamber or equivalent to use with spacer.    Moderate persistent asthma without complication       senna-docusate 8.6-50 MG per tablet    SENOKOT-S;PERICOLACE     Take 1 tablet by mouth daily        SYSTANE 0.4-0.3 % Soln ophthalmic solution   Generic drug:  polyethylene glycol 0.4%- propylene glycol 0.3%      Place 1 drop into both eyes 3 times daily as needed for dry eyes    Keratoconjunctivitis sicca, not specified as Sjogren's, left, Glaucoma       tacrolimus 0.03 % ointment    PROTOPIC    30 g    Apply twice daily to red areas around the eyes for 4 weeks.    Dermatitis       traZODone 50 MG tablet    DESYREL    30 tablet    Take 0.5-1 tablets (25-50 mg) by mouth nightly as needed for sleep    Insomnia due to medical condition       * Notice:  This list has 2 medication(s) that are the same as other medications prescribed for you. Read the directions carefully, and ask your doctor or other care provider to review them with you.

## 2018-08-27 ENCOUNTER — ALLIED HEALTH/NURSE VISIT (OUTPATIENT)
Dept: PHARMACY | Facility: CLINIC | Age: 83
End: 2018-08-27
Payer: COMMERCIAL

## 2018-08-27 DIAGNOSIS — K59.09 CHRONIC CONSTIPATION: ICD-10-CM

## 2018-08-27 DIAGNOSIS — J44.9 CHRONIC OBSTRUCTIVE PULMONARY DISEASE, UNSPECIFIED COPD TYPE (H): ICD-10-CM

## 2018-08-27 DIAGNOSIS — G47.00 INSOMNIA, UNSPECIFIED TYPE: Primary | ICD-10-CM

## 2018-08-27 DIAGNOSIS — F51.04 CHRONIC INSOMNIA: ICD-10-CM

## 2018-08-27 DIAGNOSIS — K21.9 GASTROESOPHAGEAL REFLUX DISEASE WITHOUT ESOPHAGITIS: ICD-10-CM

## 2018-08-27 PROCEDURE — 99607 MTMS BY PHARM ADDL 15 MIN: CPT | Performed by: PHARMACIST

## 2018-08-27 PROCEDURE — 99606 MTMS BY PHARM EST 15 MIN: CPT | Performed by: PHARMACIST

## 2018-08-27 RX ORDER — DOXEPIN HYDROCHLORIDE 10 MG/ML
6 SOLUTION ORAL AT BEDTIME
Qty: 60 ML | Refills: 0
Start: 2018-08-27 | End: 2018-09-19

## 2018-08-27 NOTE — PATIENT INSTRUCTIONS
Recommendations from today's MTM visit:                                                      1. No medication changes made today.    Next MTM visit: **    To schedule another MTM appointment, please call the clinic directly or you may call the MTM scheduling line at [phone] or toll-free at [phone].     My Clinical Pharmacist's contact information:                                                      It was a pleasure seeing you today!  Please feel free to contact me with any questions or concerns you have.      Almaz Christopher, Pharm.D, [facility]  Medication Therapy Management Pharmacist      You may receive a survey about the MTM services you received.  I would appreciate your feedback to help me serve you better in the future. Please fill it out and return it when you can. Your comments will be anonymous.

## 2018-08-27 NOTE — PROGRESS NOTES
"SUBJECTIVE/OBJECTIVE:                           Nancy Benz is a 89 year old female called for a follow-up visit for Medication Therapy Management.  She was referred to me from Candy Trujillo.     Chief Complaint: Medication Review. Insomnia follow up from 3/6/2018.    Allergies/ADRs: Reviewed in Epic  Tobacco: No tobacco use  Alcohol: 7-9 beverages / week; bottle of beer a day  Caffeine: 1 cups/day of decoffee  Activity: walks the hallway and uses a NuStep machine 4 times a week for 30 minutes  PMH: Reviewed in [facility]    Patient has a nurse that comes into the home every 2 weeks and sets up her medications.      GERD: Current medications include: none. Patient discontinued pantoprazole and had not had any symptoms of reflux.     Asthma/COPD: current therapy includes fluticasone 220mg 2 puffs bid, patient does rinse her mouth after using, albuterol 2 puffs every 4 hours as needed. Patient has not had to use albuterol recently.   Oxygen 2L at night. Patient states her breathing has been \"good\". Patient also has albuterol nebs and duonebs but has not had to use since she was hospitalized in December. Patient denies side effects.    Insomnia: Current medications include: doxepin 3mg (pt states her dose was doubled, when she looks at her syringe it is filled to the \"6 marking on the syringe\", med list has patient taking 3mg). Patient has a nurse that comes and measures up the medication for her. Pt reports trouble falling asleep. Doxepin does help her fall asleep. Patient awake all night last night. Patient states the doxepin has been working well for her but last night she wasn't able to sleep. Patient had also been prescribed trazodone but didn't find this effective. Patient denies side effects.     Constipation: current therapy includes senna-s daily and an additional tablet as needed patient has IBS-constipation. Patient has been having regular bowel movement.     Current labs include:  Today's Vitals: There were no " vitals taken for this visit.-phone visit  BP Readings from Last 3 Encounters:   08/22/18 134/64   07/31/18 128/70   06/08/18 114/52     No results found for: A1C.  Lab Results   Component Value Date    CHOL 175 09/11/2017     Lab Results   Component Value Date    TRIG 88 09/11/2017     Lab Results   Component Value Date    HDL 93 09/11/2017     Lab Results   Component Value Date    LDL 64 09/11/2017       Liver Function Studies -   Recent Labs   Lab Test  09/11/17   0849  05/08/17   1531   PROTTOTAL   --   6.7*   ALBUMIN   --   3.2*   BILITOTAL   --   0.4   ALKPHOS   --   51   AST   --   16   ALT  18  28       Lab Results   Component Value Date    UCRR 113 09/11/2017    MICROL 27 09/11/2017    UMALCR 24.16 09/11/2017       Last Basic Metabolic Panel:  Lab Results   Component Value Date     03/15/2018      Lab Results   Component Value Date    POTASSIUM 4.0 03/15/2018     Lab Results   Component Value Date    CHLORIDE 101 03/15/2018     Lab Results   Component Value Date    BUN 19 03/15/2018     Lab Results   Component Value Date    CR 0.58 03/15/2018     GFR Estimate   Date Value Ref Range Status   03/15/2018 >90 >60 mL/min/1.7m2 Final     Comment:     Non  GFR Calc   01/05/2018 >90 >60 mL/min/1.7m2 Final     Comment:     Non  GFR Calc   09/11/2017 71 >60 mL/min/1.7m2 Final     Comment:     Non  GFR Calc     GFR Estimate If Black   Date Value Ref Range Status   03/15/2018 >90 >60 mL/min/1.7m2 Final     Comment:      GFR Calc   01/05/2018 >90 >60 mL/min/1.7m2 Final     Comment:      GFR Calc   09/11/2017 86 >60 mL/min/1.7m2 Final     Comment:      GFR Calc       Most Recent Immunizations   Administered Date(s) Administered     Influenza (High Dose) 3 valent vaccine 10/03/2017     Pneumo Conj 13-V (2010&after) 07/05/2016     Pneumococcal 23 valent 10/19/2010     TD (ADULT, 7+) 09/01/2000     Tdap (Adacel,Boostrix)  09/22/2015       ASSESSMENT:                             Current medications were reviewed today.     Medication Adherence: good, no issues identified    GERD: Stable.    Asthma/COPD: stable    Insomnia: Stable; patient dose was increased to 6mg at bedtime via [employer] message 5/16/2018. Med list updated.    Constipation: Stable    PLAN:                            No medication changes made today.    I spent 30 minutes with this patient today. All changes were made via collaborative practice agreement with Candy Trujillo. A copy of the visit note was provided to the patient's primary care provider.    Will follow up in 3 months.    The patient was sent via [employer] a summary of these recommendations as an after visit summary.     Almaz Christopher, Pharm.D, BCACP  Medication Therapy Management Pharmacist

## 2018-08-27 NOTE — MR AVS SNAPSHOT
After Visit Summary   8/27/2018    Nancy Benz    MRN: [id number]           Patient Information     Date Of Birth          8/5/1922        Visit Information        Provider Department      8/27/2018 1:30 PM Almaz Christopher, [facility] MTM        Today's Diagnoses     Insomnia, unspecified type    -  1    Chronic constipation        Gastroesophageal reflux disease without esophagitis        Chronic obstructive pulmonary disease, unspecified COPD type (H)        Chronic insomnia          Care Instructions    Recommendations from today's MTM visit:                                                      1. No medication changes made today.    Next MTM visit: **    To schedule another MTM appointment, please call the clinic directly or you may call the MTM scheduling line at [phone] or toll-free at [phone].     My Clinical Pharmacist's contact information:                                                      It was a pleasure seeing you today!  Please feel free to contact me with any questions or concerns you have.      Almaz Christopher, Pharm.D, [facility]  Medication Therapy Management Pharmacist      You may receive a survey about the MTM services you received.  I would appreciate your feedback to help me serve you better in the future. Please fill it out and return it when you can. Your comments will be anonymous.                  Follow-ups after your visit        Your next 10 appointments already scheduled     Sep 04, 2018  1:00 PM CDT   (Arrive by 12:45 PM)   XR LUMBAR EPIDURAL INJECTION with MGXR3, MG NEURO RAD   [facility]    [address] [place] [address] [phone]           Please bring any scans or X-rays taken at other hospitals, if similar tests were done. Also bring a list of your medicines, including vitamins, minerals and over-the-counter drugs. It is safest to leave personal items  at home.  Injections take about 30 to 45 minutes. Most people spend up to 2 hours in the clinic or hospital.  Plan to have someone drive you home afterward.  Tell your doctor in advance:   If you are allergic to X-ray dye (contrast fluid).   If you may be pregnant.   If you take Coumadin (or any other blood thinners) you may need to stop taking them up to 5 days prior to the exam. Talk to your doctor before stopping.   If you take Plavix, Ticlid, Pletal or Persantine, please ask your doctor if you should stop these medicines. You may need extra tests on the morning of your scan.   If you take Xarelto (Rivaroxaban), you may need to stop taking it 24 hours before treatment. Talk to your doctor before stopping this medicine.   If you have any questions, please call the Imaging Department where you will have your exam. Directions, parking instructions, and other information are available on our website, [email or web address].            Sep 19, 2018  9:00 AM CDT   LAB with LAB FIRST FLOOR [facility])    [address] [place] [address]   [phone]           Please do not eat 10-12 hours before your appointment if you are coming in fasting for labs on lipids, cholesterol, or glucose (sugar). This does not apply to pregnant women. Water, hot tea and black coffee (with nothing added) are okay. Do not drink other fluids, diet soda or chew gum.            Sep 19, 2018  9:30 AM CDT   Return Visit with Candy Trujillo MD PhD   [facility])    [address] [place] [address]   [phone]            Nov 06, 2018 10:30 AM CST   Return Visit with Lee Reyes MD, [facility] NURSE ONLY   [facility])    [address] [place] [address]   [phone]            Nov 26, 2018  1:00 PM CST   TELEMEDICINE with Almaz Christopher [facility]    [facility] ([facility])    [address]  [address]  [facility] [address] [phone]           Note: this is not an onsite visit; there is no need to come to the facility.            Jun 20, 2019  2:00 PM CDT   [facility] Visit with [facility]as [facility]   [facility]    [address]  [facility] [address] [phone]              Who to contact     If you have questions or need follow up information about today's clinic visit or your schedule please contact [facility] directly at [phone].  Normal or non-critical lab and imaging results will be communicated to you by MyChart, letter or phone within 4 business days after the clinic has received the results. If you do not hear from us within 7 days, please contact the clinic through MyChart or phone. If you have a critical or abnormal lab result, we will notify you by phone as soon as possible.  Submit refill requests through [employer] or call your pharmacy and they will forward the refill request to us. Please allow 3 business days for your refill to be completed.          Additional Information About Your Visit        [employer] Information     [employer] gives you secure access to your electronic health record. If you see a primary care provider, you can also send messages to your care team and make appointments. If you have questions, please call your primary care clinic.  If you do not have a primary care provider, please call [phone] and they will assist you.        Care EveryWhere ID     This is your Care EveryWhere ID. This could be used by other organizations to access your [place] medical records  [id number]         Blood Pressure from Last 3 Encounters:   08/22/18 134/64   07/31/18 128/70   06/08/18 114/52    Weight from Last 3 Encounters:   07/31/18 117 lb (53.1 kg)   06/08/18 119 lb 9.6 oz (54.3 kg)   05/01/18 121 lb 12.8  oz (55.2 kg)              Today, you had the following     No orders found for display         Today's Medication Changes          These changes are accurate as of 8/27/18  4:05 PM.  If you have any questions, ask your nurse or doctor.               These medicines have changed or have updated prescriptions.        Dose/Directions    doxepin 10 MG/ML (HIGH CONC) solution   Commonly known as:  SINEquan   This may have changed:  how much to take   Used for:  Chronic insomnia        Dose:  6 mg   Take 0.6 mLs (6 mg) by mouth At Bedtime   Quantity:  60 mL   Refills:  0         Stop taking these medicines if you haven't already. Please contact your care team if you have questions.     traZODone 50 MG tablet   Commonly known as:  DESYREL                Where to get your medicines      Some of these will need a paper prescription and others can be bought over the counter.  Ask your nurse if you have questions.     You don't need a prescription for these medications     doxepin 10 MG/ML (HIGH CONC) solution                Primary Care Provider Office Phone # Fax #    Candy Trujillo MD PhD [phone] [address]  [facility] [address]        Equal Access to Services     [facility]: Hadii percy coleman hadasho Sokevin, waaxda luqadaha, qaybta kaalmada adekendrick, clark alfredo . So [facility] [phone].    ATENCIÓN: Si habla español, tiene a crandall disposición servicios gratuitos de asistencia lingüística. Llame al [phone].    We comply with applicable federal civil rights laws and [place] laws. We do not discriminate on the basis of race, color, national origin, age, disability, sex, sexual orientation, or gender identity.            Thank you!     Thank you for choosing [facility]  for your care. Our goal is always to provide you with excellent care. Hearing back from our patients is one way we can continue to improve our services. Please take a few minutes to  complete the written survey that you may receive in the mail after your visit with us. Thank you!             Your Updated Medication List - Protect others around you: Learn how to safely use, store and throw away your medicines at www.disposemymeds.org.          This list is accurate as of 8/27/18  4:05 PM.  Always use your most recent med list.                   Brand Name Dispense Instructions for use Diagnosis    * albuterol (2.5 MG/3ML) 0.083% neb solution      Take 1 vial by nebulization every 6 hours as needed for shortness of breath / dyspnea or wheezing        * albuterol 108 (90 Base) MCG/ACT inhaler    PROAIR HFA/PROVENTIL HFA/VENTOLIN HFA    3 Inhaler    Inhale 2 puffs into the lungs every 4 hours as needed for shortness of breath / dyspnea or wheezing    Moderate persistent asthma with exacerbation       bimatoprost 0.01 % Soln    LUMIGAN    3 Bottle    Place 1 drop into both eyes At Bedtime    Primary open angle glaucoma of both eyes, unspecified glaucoma stage       brimonidine-timolol 0.2-0.5 % ophthalmic solution    COMBIGAN    10 mL    Place 1 drop into both eyes 2 times daily    Primary open angle glaucoma of both eyes, moderate stage       CALCIUM 600 + D 600-200 MG-UNIT Tabs   Generic drug:  Calcium Carb-Cholecalciferol      Take 1 tablet by mouth daily Vitamin Z=2014 iu    Keratoconjunctivitis sicca, not specified as Sjogren's, left, Glaucoma       cycloSPORINE 0.05 % ophthalmic emulsion    RESTASIS    1 Box    Place 1 drop into both eyes every 12 hours    Keratoconjunctivitis sicca, not specified as Sjogren's, left       diltiazem 120 MG 24 hr capsule    DILT-XR    90 capsule    TAKE 1 CAPSULE EVERY DAY    Essential hypertension with goal blood pressure less than 140/90       doxepin 10 MG/ML (HIGH CONC) solution    SINEquan    60 mL    Take 0.6 mLs (6 mg) by mouth At Bedtime    Chronic insomnia       FLUoxetine 20 MG capsule    PROzac    90 capsule    Take 1 capsule (20 mg) by mouth daily     Depression with anxiety       fluticasone 220 MCG/ACT Inhaler    FLOVENT HFA    36 g    Inhale 2 puffs into the lungs 2 times daily    Simple chronic bronchitis (H)       gabapentin 100 MG capsule    NEURONTIN    180 capsule    Take 2 capsules (200 mg) by mouth daily (late afternoon)    Restless leg syndrome       hydrocortisone 2.5 % ointment     30 g    Apply topically 2 times daily Apply twice per day up to 1 week, take week off .    Dermatitis       ipratropium - albuterol 0.5 mg/2.5 mg/3 mL 0.5-2.5 (3) MG/3ML    DUONEB     Take 1 vial by nebulization every 6 hours as needed for shortness of breath / dyspnea or wheezing        LORazepam 0.5 MG tablet    ATIVAN    4 tablet    Take 0.5-1 tablets (0.25-0.5 mg) by mouth once as needed for anxiety (take 30 minutes prior to MRI) Take 30 minutes prior to departure.  Do not operate a vehicle after taking this medication    Claustrophobia       pravastatin 40 MG tablet    PRAVACHOL    45 tablet    TAKE 1/2 TABLET EVERY DAY    Hyperlipidemia LDL goal <100       Respiratory Therapy Supplies Deja     1 each    Optichamber or equivalent to use with spacer.    Moderate persistent asthma without complication       senna-docusate 8.6-50 MG per tablet    SENOKOT-S;PERICOLACE     Take 1 tablet by mouth daily        SYSTANE 0.4-0.3 % Soln ophthalmic solution   Generic drug:  polyethylene glycol 0.4%- propylene glycol 0.3%      Place 1 drop into both eyes 3 times daily as needed for dry eyes    Keratoconjunctivitis sicca, not specified as Sjogren's, left, Glaucoma       tacrolimus 0.03 % ointment    PROTOPIC    30 g    Apply twice daily to red areas around the eyes for 4 weeks.    Dermatitis       * Notice:  This list has 2 medication(s) that are the same as other medications prescribed for you. Read the directions carefully, and ask your doctor or other care provider to review them with you.

## 2018-09-04 ENCOUNTER — RADIANT APPOINTMENT (OUTPATIENT)
Dept: GENERAL RADIOLOGY | Facility: CLINIC | Age: 83
End: 2018-09-04
Attending: PREVENTIVE MEDICINE
Payer: COMMERCIAL

## 2018-09-04 VITALS — OXYGEN SATURATION: 94 % | DIASTOLIC BLOOD PRESSURE: 65 MMHG | HEART RATE: 65 BPM | SYSTOLIC BLOOD PRESSURE: 128 MMHG

## 2018-09-04 DIAGNOSIS — M48.061 SPINAL STENOSIS OF LUMBAR REGION, UNSPECIFIED WHETHER NEUROGENIC CLAUDICATION PRESENT: ICD-10-CM

## 2018-09-04 DIAGNOSIS — M51.379 DEGENERATION OF LUMBAR OR LUMBOSACRAL INTERVERTEBRAL DISC: ICD-10-CM

## 2018-09-04 PROCEDURE — 62323 NJX INTERLAMINAR LMBR/SAC: CPT | Performed by: RADIOLOGY

## 2018-09-04 RX ORDER — IOPAMIDOL 408 MG/ML
10 INJECTION, SOLUTION INTRATHECAL ONCE
Status: COMPLETED | OUTPATIENT
Start: 2018-09-04 | End: 2018-09-04

## 2018-09-04 RX ORDER — METHYLPREDNISOLONE ACETATE 80 MG/ML
80 INJECTION, SUSPENSION INTRA-ARTICULAR; INTRALESIONAL; INTRAMUSCULAR; SOFT TISSUE ONCE
Status: COMPLETED | OUTPATIENT
Start: 2018-09-04 | End: 2018-09-04

## 2018-09-04 RX ORDER — BUPIVACAINE HYDROCHLORIDE 5 MG/ML
3 INJECTION, SOLUTION PERINEURAL ONCE
Status: COMPLETED | OUTPATIENT
Start: 2018-09-04 | End: 2018-09-04

## 2018-09-04 RX ORDER — LIDOCAINE HYDROCHLORIDE 10 MG/ML
5 INJECTION, SOLUTION EPIDURAL; INFILTRATION; INTRACAUDAL; PERINEURAL ONCE
Status: COMPLETED | OUTPATIENT
Start: 2018-09-04 | End: 2018-09-04

## 2018-09-04 RX ADMIN — BUPIVACAINE HYDROCHLORIDE 15 MG: 5 INJECTION, SOLUTION PERINEURAL at 13:45

## 2018-09-04 RX ADMIN — LIDOCAINE HYDROCHLORIDE 5 ML: 10 INJECTION, SOLUTION EPIDURAL; INFILTRATION; INTRACAUDAL; PERINEURAL at 13:45

## 2018-09-04 RX ADMIN — IOPAMIDOL 1 ML: 408 INJECTION, SOLUTION INTRATHECAL at 13:45

## 2018-09-04 RX ADMIN — METHYLPREDNISOLONE ACETATE 80 MG: 80 INJECTION, SUSPENSION INTRA-ARTICULAR; INTRALESIONAL; INTRAMUSCULAR; SOFT TISSUE at 13:46

## 2018-09-04 NOTE — PROGRESS NOTES
: Nancy was seen in X-ray today for a lumbar epidural injection. Patient rated pain before procedure 0/10. After procedure patient rated pain 0/10. This pain level is (is/is not) acceptable to patient. Patient discharged home with .    AFTER YOU GO HOME    ? DO relax; minimize your activity for 24 hours  ? You may resume normal activity tomorrow  ? You may remove the bandage in the evening or next morning  ? You may resume bathing the next day  ? Drink at least 4 extra glasses of fluid today if not on fluid restrictions  ? DO NOT drive or operate machinery at home or at work for at least 24 hours      VISIT THE EMERGENCY ROOM OR URGENT CARE IF:    ? There is redness or swelling at the injection site  ? There is discharge from the injection site  ? You develop a temperature of 101  F or greater      ADDITIONAL INSTRUCTIONS:     ? You may resume your Coumadin or other blood thinner at your regular dose today.  Follow up with your physician to have your INR rechecked if indicated.  ? If you gain no relief from the injection after two (2) weeks, follow-up with your provider for your options.        Contacts:    During business hours from 8 to 5 pm, you may call [phone] to reach a nurse advisor at [facility].  After hours, call [facility]  [phone].  Ask for the Radiologist on-call.  Someone is on-call 24 hrs/day.  [facility] Toll Free Number   [phone]

## 2018-09-13 ENCOUNTER — MEDICAL CORRESPONDENCE (OUTPATIENT)
Dept: HEALTH INFORMATION MANAGEMENT | Facility: CLINIC | Age: 83
End: 2018-09-13

## 2018-09-17 ENCOUNTER — MYC MEDICAL ADVICE (OUTPATIENT)
Dept: PEDIATRICS | Facility: CLINIC | Age: 83
End: 2018-09-17

## 2018-09-17 ENCOUNTER — DOCUMENTATION ONLY (OUTPATIENT)
Dept: LAB | Facility: CLINIC | Age: 83
End: 2018-09-17

## 2018-09-17 DIAGNOSIS — E78.5 HYPERLIPIDEMIA LDL GOAL <130: Primary | ICD-10-CM

## 2018-09-17 DIAGNOSIS — R73.03 PREDIABETES: ICD-10-CM

## 2018-09-17 DIAGNOSIS — I10 ESSENTIAL HYPERTENSION: ICD-10-CM

## 2018-09-18 NOTE — TELEPHONE ENCOUNTER
Routing [employer] message to Dr. Trujillo  to please review when able.      Almaz Mclean RN, [facility]

## 2018-09-19 ENCOUNTER — DOCUMENTATION ONLY (OUTPATIENT)
Dept: LAB | Facility: CLINIC | Age: 83
End: 2018-09-19

## 2018-09-19 ENCOUNTER — OFFICE VISIT (OUTPATIENT)
Dept: PEDIATRICS | Facility: CLINIC | Age: 83
End: 2018-09-19
Payer: COMMERCIAL

## 2018-09-19 VITALS
HEART RATE: 60 BPM | BODY MASS INDEX: 23.44 KG/M2 | OXYGEN SATURATION: 97 % | DIASTOLIC BLOOD PRESSURE: 60 MMHG | TEMPERATURE: 96.5 F | SYSTOLIC BLOOD PRESSURE: 90 MMHG | WEIGHT: 120 LBS

## 2018-09-19 DIAGNOSIS — R82.90 NONSPECIFIC FINDING ON EXAMINATION OF URINE: ICD-10-CM

## 2018-09-19 DIAGNOSIS — E78.5 HYPERLIPIDEMIA LDL GOAL <130: ICD-10-CM

## 2018-09-19 DIAGNOSIS — R30.0 DYSURIA: Primary | ICD-10-CM

## 2018-09-19 DIAGNOSIS — I10 ESSENTIAL HYPERTENSION: ICD-10-CM

## 2018-09-19 DIAGNOSIS — R73.03 PREDIABETES: ICD-10-CM

## 2018-09-19 DIAGNOSIS — R42 DIZZINESSES: Primary | ICD-10-CM

## 2018-09-19 DIAGNOSIS — I10 ESSENTIAL HYPERTENSION WITH GOAL BLOOD PRESSURE LESS THAN 140/90: ICD-10-CM

## 2018-09-19 DIAGNOSIS — N39.0 ACUTE UTI: ICD-10-CM

## 2018-09-19 DIAGNOSIS — R42 VERTIGO: ICD-10-CM

## 2018-09-19 DIAGNOSIS — F51.04 CHRONIC INSOMNIA: ICD-10-CM

## 2018-09-19 DIAGNOSIS — F41.8 DEPRESSION WITH ANXIETY: ICD-10-CM

## 2018-09-19 LAB
ALBUMIN UR-MCNC: 10 MG/DL
ALT SERPL W P-5'-P-CCNC: 19 U/L (ref 0–50)
ANION GAP SERPL CALCULATED.3IONS-SCNC: 6 MMOL/L (ref 3–14)
APPEARANCE UR: ABNORMAL
BACTERIA #/AREA URNS HPF: ABNORMAL /HPF
BILIRUB UR QL STRIP: NEGATIVE
BUN SERPL-MCNC: 25 MG/DL (ref 7–30)
CALCIUM SERPL-MCNC: 8.4 MG/DL (ref 8.5–10.1)
CHLORIDE SERPL-SCNC: 102 MMOL/L (ref 94–109)
CHOLEST SERPL-MCNC: 194 MG/DL
CO2 SERPL-SCNC: 31 MMOL/L (ref 20–32)
COLOR UR AUTO: YELLOW
CREAT SERPL-MCNC: 0.68 MG/DL (ref 0.52–1.04)
CREAT UR-MCNC: 111 MG/DL
GFR SERPL CREATININE-BSD FRML MDRD: 80 ML/MIN/1.7M2
GLUCOSE SERPL-MCNC: 92 MG/DL (ref 70–99)
GLUCOSE UR STRIP-MCNC: NEGATIVE MG/DL
HDLC SERPL-MCNC: 93 MG/DL
HGB UR QL STRIP: NEGATIVE
KETONES UR STRIP-MCNC: NEGATIVE MG/DL
LDLC SERPL CALC-MCNC: 80 MG/DL
LEUKOCYTE ESTERASE UR QL STRIP: ABNORMAL
MICROALBUMIN UR-MCNC: 72 MG/L
MICROALBUMIN/CREAT UR: 65.14 MG/G CR (ref 0–25)
NITRATE UR QL: POSITIVE
NONHDLC SERPL-MCNC: 101 MG/DL
PH UR STRIP: 6.5 PH (ref 5–7)
POTASSIUM SERPL-SCNC: 4.4 MMOL/L (ref 3.4–5.3)
RBC #/AREA URNS AUTO: ABNORMAL /HPF
SODIUM SERPL-SCNC: 139 MMOL/L (ref 133–144)
SOURCE: ABNORMAL
SP GR UR STRIP: 1.02 (ref 1–1.03)
TRANS CELLS #/AREA URNS HPF: ABNORMAL /HPF
TRIGL SERPL-MCNC: 105 MG/DL
UROBILINOGEN UR STRIP-MCNC: NORMAL MG/DL (ref 0–2)
WBC #/AREA URNS AUTO: >100 /HPF
WBC CLUMPS #/AREA URNS HPF: PRESENT /HPF

## 2018-09-19 PROCEDURE — 87086 URINE CULTURE/COLONY COUNT: CPT | Performed by: INTERNAL MEDICINE

## 2018-09-19 PROCEDURE — 87186 SC STD MICRODIL/AGAR DIL: CPT | Performed by: INTERNAL MEDICINE

## 2018-09-19 PROCEDURE — 99214 OFFICE O/P EST MOD 30 MIN: CPT | Performed by: INTERNAL MEDICINE

## 2018-09-19 PROCEDURE — 36415 COLL VENOUS BLD VENIPUNCTURE: CPT | Performed by: INTERNAL MEDICINE

## 2018-09-19 PROCEDURE — 80061 LIPID PANEL: CPT | Performed by: INTERNAL MEDICINE

## 2018-09-19 PROCEDURE — 80048 BASIC METABOLIC PNL TOTAL CA: CPT | Performed by: INTERNAL MEDICINE

## 2018-09-19 PROCEDURE — 87088 URINE BACTERIA CULTURE: CPT | Performed by: INTERNAL MEDICINE

## 2018-09-19 PROCEDURE — 84460 ALANINE AMINO (ALT) (SGPT): CPT | Performed by: INTERNAL MEDICINE

## 2018-09-19 PROCEDURE — 81001 URINALYSIS AUTO W/SCOPE: CPT | Performed by: INTERNAL MEDICINE

## 2018-09-19 PROCEDURE — 82043 UR ALBUMIN QUANTITATIVE: CPT | Performed by: INTERNAL MEDICINE

## 2018-09-19 RX ORDER — CEPHALEXIN 500 MG/1
500 CAPSULE ORAL 2 TIMES DAILY
Qty: 14 CAPSULE | Refills: 0 | Status: SHIPPED | OUTPATIENT
Start: 2018-09-19 | End: 2018-09-26

## 2018-09-19 RX ORDER — DOXEPIN HYDROCHLORIDE 10 MG/ML
6 SOLUTION ORAL AT BEDTIME
Qty: 54 ML | Refills: 3 | Status: SHIPPED | OUTPATIENT
Start: 2018-09-19 | End: 2018-11-26 | Stop reason: ALTCHOICE

## 2018-09-19 ASSESSMENT — ANXIETY QUESTIONNAIRES
1. FEELING NERVOUS, ANXIOUS, OR ON EDGE: NOT AT ALL
5. BEING SO RESTLESS THAT IT IS HARD TO SIT STILL: NOT AT ALL
7. FEELING AFRAID AS IF SOMETHING AWFUL MIGHT HAPPEN: NOT AT ALL
GAD7 TOTAL SCORE: 1
2. NOT BEING ABLE TO STOP OR CONTROL WORRYING: NOT AT ALL
6. BECOMING EASILY ANNOYED OR IRRITABLE: SEVERAL DAYS
3. WORRYING TOO MUCH ABOUT DIFFERENT THINGS: NOT AT ALL

## 2018-09-19 ASSESSMENT — PATIENT HEALTH QUESTIONNAIRE - PHQ9: 5. POOR APPETITE OR OVEREATING: NOT AT ALL

## 2018-09-19 NOTE — PROGRESS NOTES
Pt is requesting a urine sample for a UA to be done she thinks it could be a  UTI her urine was very dark and had a strong smell. Please release order if needed   Thanks [facility]

## 2018-09-19 NOTE — MR AVS SNAPSHOT
After Visit Summary   9/19/2018    Nancy Benz    MRN: [id number]           Patient Information     Date Of Birth          8/5/1922        Visit Information        Provider Department      9/19/2018 9:30 AM Candy Trujillo MD PhD [facility]        Today's Diagnoses     Dizzinesses    -  1    Vertigo        Nonspecific finding on examination of urine        Acute UTI        Essential hypertension with goal blood pressure less than 140/90        Depression with anxiety        Chronic insomnia        Restless leg syndrome          Care Instructions    Make appointment(s) for:   --wellness visit in 6 months.     Antibiotic Keflex for UTI at our pharmacy. The rest are through [employer].     Medication(s) prescribed today:    Orders Placed This Encounter   Medications     cephALEXin (KEFLEX) 500 MG capsule     Sig: Take 1 capsule (500 mg) by mouth 2 times daily for 7 days     Dispense:  14 capsule     Refill:  0     FLUoxetine (PROZAC) 20 MG capsule     Sig: Take 1 capsule (20 mg) by mouth daily     Dispense:  90 capsule     Refill:  3     doxepin (SINEQUAN) 10 MG/ML (HIGH CONC) solution     Sig: Take 0.6 mLs (6 mg) by mouth At Bedtime     Dispense:  54 mL     Refill:  3                   Follow-ups after your visit        Your next 10 appointments already scheduled     Nov 06, 2018 10:30 AM CST   Return Visit with Lee Reyes MD, MG OPHTH NURSE ONLY   [facility] ([facility])    [address] [place] [address]   [phone]            Nov 26, 2018  1:00 PM CST   TELEMEDICINE with Almaz Christopher [facility])    [address] [place] [address] [address]  [address]  [facility] [address]   [phone]           Note: this is not an onsite visit; there is no need to come to the facility.            Jun 20, 2019  2:00 PM CDT   Return Visit with Sobia Terrazas MD   [facility] ([facility])    [address] [place] [address] [phone]              Who to contact     If you have questions or need follow up information about today's clinic visit or your schedule please contact [facility] directly at [phone].  Normal or non-critical lab and imaging results will be communicated to you by MyChart, letter or phone within 4 business days after the clinic has received the results. If you do not hear from us within 7 days, please contact the clinic through [employer] or phone. If you have a critical or abnormal lab result, we will notify you by phone as soon as possible.  Submit refill requests through [employer] or call your pharmacy and they will forward the refill request to us. Please allow 3 business days for your refill to be completed.          Additional Information About Your Visit        [employer] Information     [employer] gives you secure access to your electronic health record. If you see a primary care provider, you can also send messages to your care team and make appointments. If you have questions, please call your primary care clinic.  If you do not have a primary care provider, please call [phone] and they will assist you.      [employer] is an electronic gateway that provides easy, online access to your medical records. With [employer], you can request a clinic appointment, read your test results, renew a prescription or communicate with your care team.     To access your existing account, please contact your [facility] or call [phone] for assistance.        Care EveryWhere ID     This is your Care EveryWhere ID. This could be used by other organizations to access your [place] medical records  [id number]        Your Vitals Were     Pulse Temperature Pulse Oximetry BMI (Body Mass Index)          60 96.5  F (35.8  C) (Temporal) 97% 23.44 kg/m2         Blood Pressure from Last 3  Encounters:   09/19/18 90/60   09/04/18 128/65   08/22/18 134/64    Weight from Last 3 Encounters:   09/19/18 120 lb (54.4 kg)   07/31/18 117 lb (53.1 kg)   06/08/18 119 lb 9.6 oz (54.3 kg)              We Performed the Following     Urine Culture Aerobic Bacterial          Today's Medication Changes          These changes are accurate as of 9/19/18 10:08 AM.  If you have any questions, ask your nurse or doctor.               Start taking these medicines.        Dose/Directions    cephALEXin 500 MG capsule   Commonly known as:  KEFLEX   Used for:  Acute UTI   Started by:  Candy Trujillo MD PhD        Dose:  500 mg   Take 1 capsule (500 mg) by mouth 2 times daily for 7 days   Quantity:  14 capsule   Refills:  0            Where to get your medicines      These medications were sent to [facility] [place], [place] - [address], [address]  [address], [address], [facility] [address]     Phone:  [phone]     cephALEXin 500 MG capsule         These medications were sent to [facility] - [facility] [address]  [address], [facility] [address]     Phone:  [phone]     doxepin 10 MG/ML (HIGH CONC) solution    FLUoxetine 20 MG capsule                Primary Care Provider Office Phone # Fax #    Candy Trujillo MD PhD [phone]       [address]  [facility] [address]        Equal Access to Services     [facility]: Hadii percy ku hadasho Soomaali, waaxda luqadaha, qaybta kaalmada adeegyada, clark idiin hayaan adeeg kharash la'aan . So [facility] [phone].    ATENCIÓN: Si habla español, tiene a crandall disposición servicios gratuitos de asistencia lingüística. Llame al [phone].    We comply with applicable federal civil rights laws and [place] laws. We do not discriminate on the basis of race, color, national origin, age, disability, sex, sexual orientation, or gender identity.            Thank you!     Thank you for choosing [facility]  [facility] [place] [facility]  for your care. Our goal is always to provide you with excellent care. Hearing back from our patients is one way we can continue to improve our services. Please take a few minutes to complete the written survey that you may receive in the mail after your visit with us. Thank you!             Your Updated Medication List - Protect others around you: Learn how to safely use, store and throw away your medicines at www.disposemymeds.org.          This list is accurate as of 9/19/18 10:08 AM.  Always use your most recent med list.                   Brand Name Dispense Instructions for use Diagnosis    * albuterol (2.5 MG/3ML) 0.083% neb solution      Take 1 vial by nebulization every 6 hours as needed for shortness of breath / dyspnea or wheezing        * albuterol 108 (90 Base) MCG/ACT inhaler    PROAIR HFA/PROVENTIL HFA/VENTOLIN HFA    3 Inhaler    Inhale 2 puffs into the lungs every 4 hours as needed for shortness of breath / dyspnea or wheezing    Moderate persistent asthma with exacerbation       bimatoprost 0.01 % Soln    LUMIGAN    3 Bottle    Place 1 drop into both eyes At Bedtime    Primary open angle glaucoma of both eyes, unspecified glaucoma stage       brimonidine-timolol 0.2-0.5 % ophthalmic solution    COMBIGAN    10 mL    Place 1 drop into both eyes 2 times daily    Primary open angle glaucoma of both eyes, moderate stage       CALCIUM 600 + D 600-200 MG-UNIT Tabs   Generic drug:  Calcium Carb-Cholecalciferol      Take 1 tablet by mouth daily Vitamin R=6892 iu    Keratoconjunctivitis sicca, not specified as Sjogren's, left, Glaucoma       cephALEXin 500 MG capsule    KEFLEX    14 capsule    Take 1 capsule (500 mg) by mouth 2 times daily for 7 days    Acute UTI       cycloSPORINE 0.05 % ophthalmic emulsion    RESTASIS    1 Box    Place 1 drop into both eyes every 12 hours    Keratoconjunctivitis sicca, not specified as Sjogren's, left       diltiazem 120 MG 24 hr capsule    DILT-XR     90 capsule    TAKE 1 CAPSULE EVERY DAY    Essential hypertension with goal blood pressure less than 140/90       doxepin 10 MG/ML (HIGH CONC) solution    SINEquan    54 mL    Take 0.6 mLs (6 mg) by mouth At Bedtime    Chronic insomnia       FLUoxetine 20 MG capsule    PROzac    90 capsule    Take 1 capsule (20 mg) by mouth daily    Depression with anxiety       fluticasone 220 MCG/ACT Inhaler    FLOVENT HFA    36 g    Inhale 2 puffs into the lungs 2 times daily    Simple chronic bronchitis (H)       gabapentin 100 MG capsule    NEURONTIN    180 capsule    Take 2 capsules (200 mg) by mouth daily (late afternoon)    Restless leg syndrome       hydrocortisone 2.5 % ointment     30 g    Apply topically 2 times daily Apply twice per day up to 1 week, take week off .    Dermatitis       ipratropium - albuterol 0.5 mg/2.5 mg/3 mL 0.5-2.5 (3) MG/3ML    DUONEB     Take 1 vial by nebulization every 6 hours as needed for shortness of breath / dyspnea or wheezing        pravastatin 40 MG tablet    PRAVACHOL    45 tablet    TAKE 1/2 TABLET EVERY DAY    Hyperlipidemia LDL goal <100       Respiratory Therapy Supplies Deja     1 each    Optichamber or equivalent to use with spacer.    Moderate persistent asthma without complication       senna-docusate 8.6-50 MG per tablet    SENOKOT-S;PERICOLACE     Take 1 tablet by mouth daily        SYSTANE 0.4-0.3 % Soln ophthalmic solution   Generic drug:  polyethylene glycol 0.4%- propylene glycol 0.3%      Place 1 drop into both eyes 3 times daily as needed for dry eyes    Keratoconjunctivitis sicca, not specified as Sjogren's, left, Glaucoma       * Notice:  This list has 2 medication(s) that are the same as other medications prescribed for you. Read the directions carefully, and ask your doctor or other care provider to review them with you.

## 2018-09-19 NOTE — PATIENT INSTRUCTIONS
Make appointment(s) for:   --wellness visit in 6 months.     Antibiotic Keflex for UTI at our pharmacy. The rest are through [facility].     Medication(s) prescribed today:    Orders Placed This Encounter   Medications     cephALEXin (KEFLEX) 500 MG capsule     Sig: Take 1 capsule (500 mg) by mouth 2 times daily for 7 days     Dispense:  14 capsule     Refill:  0     FLUoxetine (PROZAC) 20 MG capsule     Sig: Take 1 capsule (20 mg) by mouth daily     Dispense:  90 capsule     Refill:  3     doxepin (SINEQUAN) 10 MG/ML (HIGH CONC) solution     Sig: Take 0.6 mLs (6 mg) by mouth At Bedtime     Dispense:  54 mL     Refill:  3

## 2018-09-19 NOTE — PROGRESS NOTES
SUBJECTIVE:   Nancy Benz is a 89 year old female who presents to clinic today for the following health issues:    Nancy has Monoclonal gammopathy; Nonrheumatic aortic valve insufficiency; Nocturnal oxygen desaturation; Hyperlipidemia LDL goal <130; Depression with anxiety; Restless leg syndrome; Mild persistent asthma without complication; and Essential hypertension on her pertinent problem list.     Medication Followup of all meds, also Dizziness has been getting worse. Has severe Virtigo attacks.     Taking Medication as prescribed: yes    Side Effects:  None    Medication Helping Symptoms:  yes     Chronic dizziness and vertigo. She thinks this is getting worse. She thinks it is from her eyes. Her vision is getting worse, tire easily. She can read long anymore. She used to be able to read all day, now reads 1 hour and then has to rest her eyes.     Has tinnitus all the time. Feels dizziness/a whirling in her head all the time. Nothing is new but she has two severe vertigo attacks. One time, she was walking with her walker. Then suddenly she felt the spinning around her and she fell to the ground. No loss consciousness. She scraped her right arm but no other serious injury. The arm is healed now. Another time, she was sitting but the swirling started. She closed her eyes, it was better. Then it lasted a couple of minutes passed. She used to get this type of vertigo rarely. But she has had two episodes this year. Two months apart, the last one was a few weeks ago. She reported having had extensive testing in the past for both dizziness and the vertigo, had gone through many therapy sessions. Nothing gets rid of the dizziness and the vertigo when it comes it comes. She doesn't want to do anything anymore about her vertigo and dizziness. Just monitor.    Her BP is a little low today. It was normal, better readings the last two  Visits with sports medicine.  She reported home care nurse checks her blood  pressure regularly.  Has not had any reason for concern.  She does not remember what her blood pressure is at home.    Last few days when she urinates she feels a little pain.  She is wondering about a bladder infection.  Denies any fevers or chills.  Denies any low back pain.    ROS:  Constitutional, HEENT, cardiovascular, pulmonary, gi and gu systems are negative, except as otherwise noted.         Current Outpatient Prescriptions on File Prior to Visit:  albuterol (2.5 MG/3ML) 0.083% neb solution Take 1 vial by nebulization every 6 hours as needed for shortness of breath / dyspnea or wheezing   albuterol (PROAIR HFA/PROVENTIL HFA/VENTOLIN HFA) 108 (90 BASE) MCG/ACT Inhaler Inhale 2 puffs into the lungs every 4 hours as needed for shortness of breath / dyspnea or wheezing   bimatoprost (LUMIGAN) 0.01 % SOLN Place 1 drop into both eyes At Bedtime   brimonidine-timolol (COMBIGAN) 0.2-0.5 % ophthalmic solution Place 1 drop into both eyes 2 times daily   Calcium Carb-Cholecalciferol (CALCIUM 600 + D) 600-200 MG-UNIT TABS Take 1 tablet by mouth daily Vitamin J=2655 iu   cycloSPORINE (RESTASIS) 0.05 % ophthalmic emulsion Place 1 drop into both eyes every 12 hours   diltiazem (DILT-XR) 120 MG 24 hr capsule TAKE 1 CAPSULE EVERY DAY   fluticasone (FLOVENT HFA) 220 MCG/ACT Inhaler Inhale 2 puffs into the lungs 2 times daily   gabapentin (NEURONTIN) 100 MG capsule Take 2 capsules (200 mg) by mouth daily (late afternoon)   polyethylene glycol 0.4%- propylene glycol 0.3% (SYSTANE) 0.4-0.3 % SOLN ophthalmic solution Place 1 drop into both eyes 3 times daily as needed for dry eyes   pravastatin (PRAVACHOL) 40 MG tablet TAKE 1/2 TABLET EVERY DAY   senna-docusate (SENOKOT-S;PERICOLACE) 8.6-50 MG per tablet Take 1 tablet by mouth daily   hydrocortisone 2.5 % ointment Apply topically 2 times daily Apply twice per day up to 1 week, take week off .   ipratropium - albuterol 0.5 mg/2.5 mg/3 mL (DUONEB) 0.5-2.5 (3) MG/3ML Take 1 vial by  nebulization every 6 hours as needed for shortness of breath / dyspnea or wheezing   Respiratory Therapy Supplies ROBERTO Optichamber or equivalent to use with spacer.   [DISCONTINUED] doxepin (SINEQUAN) 10 MG/ML (HIGH CONC) solution Take 0.6 mLs (6 mg) by mouth At Bedtime   [DISCONTINUED] FLUoxetine (PROZAC) 20 MG capsule Take 1 capsule (20 mg) by mouth daily     No current facility-administered medications on file prior to visit.        Patient Active Problem List   Diagnosis     Glaucoma     Keratoconjunctivitis sicca, not specified as Sjogren's, left     Cataract     Pseudophakia of both eyes     Presbyopia     Monoclonal gammopathy     Nonrheumatic aortic valve insufficiency     Dizziness     History of vertebral fracture     Nocturnal oxygen desaturation     Urgency incontinence     Hyperlipidemia LDL goal <130     Depression with anxiety     Restless leg syndrome     Mild persistent asthma without complication     ACP (advance care planning)     Chronic constipation     Prediabetes     Simple chronic bronchitis (H)     Senile osteoporosis     Essential hypertension     Past Surgical History:   Procedure Laterality Date     CATARACT IOL, RT/LT       PHACOEMULSIFICATION CLEAR CORNEA WITH STANDARD INTRAOCULAR LENS IMPLANT Left 7/10/07     PHACOEMULSIFICATION WITH INTRAOCULAR LENS IMPLANT, TRABECULECTOMY, COMBINED Right 4/30/01     REPAIR PTOSIS Bilateral 9/12/2016    Procedure: REPAIR PTOSIS;  Surgeon: Deyvi Lei MD;  Location: [facility] OR     REPAIR PTOSIS BROW Left 9/12/2016    Procedure: REPAIR PTOSIS BROW;  Surgeon: Deyvi Lei MD;  Location:  OR       Social History   Substance Use Topics     Smoking status: Never Smoker     Smokeless tobacco: Never Used     Alcohol use Yes     Family History   Problem Relation Age of Onset     Glaucoma Son      Glaucoma Daughter              Problem list, Medication list, Allergies, and Medical/Social/Surgical histories reviewed in EPIC and updated as  appropriate.    OBJECTIVE:                                                    BP 90/60  Pulse 60  Temp 96.5  F (35.8  C) (Temporal)  Wt 120 lb (54.4 kg)  SpO2 97%  BMI 23.44 kg/m2    GENERAL: healthy, alert and no distress' patient has a walker with her  HEENT: unremarkable  Neck: no adenopathy/mass/stiffness. Thyroid normal.  Lung: clear, no wheezing/rhonchi/crackles  Heart: RRR, normal S1/2, no murmur/gallop/rup  Abd: soft, normal BS, non tender, no organomegaly   Ext: no cyanosis/clubbing/edema          Diagnostic test results:  Component      Latest Ref Rng & Units 9/19/2018   Sodium      133 - 144 mmol/L 139   Potassium      3.4 - 5.3 mmol/L 4.4   Chloride      94 - 109 mmol/L 102   Carbon Dioxide      20 - 32 mmol/L 31   Anion Gap      3 - 14 mmol/L 6   Glucose      70 - 99 mg/dL 92   Urea Nitrogen      7 - 30 mg/dL 25   Creatinine      0.52 - 1.04 mg/dL 0.68   GFR Estimate      >60 mL/min/1.7m2 80   GFR Estimate If Black      >60 mL/min/1.7m2 >90   Calcium      8.5 - 10.1 mg/dL 8.4 (L)   Cholesterol      <200 mg/dL 194   Triglycerides      <150 mg/dL 105   HDL Cholesterol      >49 mg/dL 93   LDL Cholesterol Calculated      <100 mg/dL 80   Non HDL Cholesterol      <130 mg/dL 101   Creatinine Urine      mg/dL 111   Albumin Urine mg/L      mg/L 72   Albumin Urine mg/g Cr      0 - 25 mg/g Cr 65.14 (H)   ALT      0 - 50 U/L 19       Results for orders placed or performed in visit on 09/19/18   UA with Microscopic reflex to Culture (Lorene Puente; [facility])   Result Value Ref Range    Color Urine Yellow     Appearance Urine Cloudy     Glucose Urine Negative NEG^Negative mg/dL    Bilirubin Urine Negative NEG^Negative    Ketones Urine Negative NEG^Negative mg/dL    Specific Gravity Urine 1.018 1.003 - 1.035    Blood Urine Negative NEG^Negative    pH Urine 6.5 5.0 - 7.0 pH    Protein Albumin Urine 10 (A) NEG^Negative mg/dL    Urobilinogen mg/dL Normal 0.0 - 2.0 mg/dL    Nitrite Urine Positive (A)  NEG^Negative    Leukocyte Esterase Urine Large (A) NEG^Negative    Source Midstream Urine     WBC Urine >100 (A) OTO5^0 - 5 /HPF    RBC Urine O - 2 OTO2^O - 2 /HPF    WBC Clumps Present (A) NEG^Negative /HPF    Bacteria Urine Many (A) NEG^Negative /HPF    Transitional Epi Moderate (A) FEW^Few /HPF         ASSESSMENT/PLAN:                                                      89 year old female with the following diagnoses and treatment plan:      ICD-10-CM    1. Dizzinesses R42    2. Vertigo R42    3. Nonspecific finding on examination of urine R82.90 Urine Culture Aerobic Bacterial   4. Acute UTI N39.0 cephALEXin (KEFLEX) 500 MG capsule   5. Essential hypertension with goal blood pressure less than 140/90 I10    6. Depression with anxiety F41.8 FLUoxetine (PROZAC) 20 MG capsule   7. Chronic insomnia F51.04 doxepin (SINEQUAN) 10 MG/ML (HIGH CONC) solution       --Chronic health issues stable.  Had to vertigo spells.  Patient reported symptoms exactly the same as previous episodes.  She does not want any further workup with therapy or evaluation.  She wants to just monitor the symptoms.  --Acute UTI: We will treat with Keflex.  --Hypertension: BP is on the lower side today.  Her last blood pressure 2 weeks ago was 128/65.  For the time being will not change diltiazem dose.  Patient will have home care nurse check her blood pressure and let us know if it is low.  --Return in 6 months for wellness visit.    Will call or return to clinic if worsening or symptoms not improving as discussed.  See Patient Instructions.      Candy Trujillo MD-PhD  [facility]    (Note: Chart documentation was done in part with Dragon Voice Recognition software. Although reviewed after completion, some word and grammatical errors may remain.)

## 2018-09-21 LAB
BACTERIA SPEC CULT: ABNORMAL
SPECIMEN SOURCE: ABNORMAL

## 2018-09-21 ASSESSMENT — PATIENT HEALTH QUESTIONNAIRE - PHQ9: SUM OF ALL RESPONSES TO PHQ QUESTIONS 1-9: 4

## 2018-09-21 ASSESSMENT — ANXIETY QUESTIONNAIRES: GAD7 TOTAL SCORE: 1

## 2018-10-04 DIAGNOSIS — Z53.9 DIAGNOSIS NOT YET DEFINED: Primary | ICD-10-CM

## 2018-10-04 DIAGNOSIS — E78.5 HYPERLIPIDEMIA LDL GOAL <100: ICD-10-CM

## 2018-10-04 PROCEDURE — G0179 MD RECERTIFICATION HHA PT: HCPCS | Performed by: INTERNAL MEDICINE

## 2018-10-04 RX ORDER — PRAVASTATIN SODIUM 40 MG
TABLET ORAL
Qty: 45 TABLET | Refills: 3 | Status: SHIPPED | OUTPATIENT
Start: 2018-10-04

## 2018-10-04 NOTE — TELEPHONE ENCOUNTER
Refilled per protocol.    Lorraine Crespo RN   .[facility] [place] [facility]

## 2018-10-30 ENCOUNTER — MYC MEDICAL ADVICE (OUTPATIENT)
Dept: PEDIATRICS | Facility: CLINIC | Age: 83
End: 2018-10-30

## 2018-11-06 ENCOUNTER — OFFICE VISIT (OUTPATIENT)
Dept: OPHTHALMOLOGY | Facility: CLINIC | Age: 83
End: 2018-11-06
Payer: COMMERCIAL

## 2018-11-06 DIAGNOSIS — H40.1130 PRIMARY OPEN ANGLE GLAUCOMA OF BOTH EYES, UNSPECIFIED GLAUCOMA STAGE: Primary | ICD-10-CM

## 2018-11-06 DIAGNOSIS — H16.222: ICD-10-CM

## 2018-11-06 PROCEDURE — 92012 INTRM OPH EXAM EST PATIENT: CPT | Performed by: OPHTHALMOLOGY

## 2018-11-06 ASSESSMENT — TONOMETRY
OS_IOP_MMHG: 16
IOP_METHOD: TONOPEN
OD_IOP_MMHG: 15

## 2018-11-06 ASSESSMENT — VISUAL ACUITY
OS_PH_SC: 20/40-1
OS_SC: 20/80
OD_SC: CF @3 FT
METHOD: SNELLEN - LINEAR
OS_SC+: +1

## 2018-11-06 ASSESSMENT — PACHYMETRY
OS_CT(UM): 540
OD_CT(UM): 514

## 2018-11-06 ASSESSMENT — EXTERNAL EXAM - LEFT EYE: OS_EXAM: S/P PTOSIS REPAIR

## 2018-11-06 ASSESSMENT — EXTERNAL EXAM - RIGHT EYE: OD_EXAM: S/P PTOSIS REPAIR

## 2018-11-06 ASSESSMENT — CUP TO DISC RATIO
OD_RATIO: 0.7
OS_RATIO: 0.6

## 2018-11-06 NOTE — PATIENT INSTRUCTIONS
Continue using lumigan at bedtime and combigan twice daily in both eyes  Do the lid hygiene and warm compresses at least twice daily if not more.    Good Lid Hygiene   To treat the problem, you need to keep your eyelids clean. Warm compresses help reduce redness and swelling and keep the eyelids clean. You may also need to clean your eyelids when you wake up.    To apply a warm compress:  1. Wash your hands with soap and warm water.  2. Wet a clean washcloth with warm water. Then wring it out.  3. Close your eyes and place the washcloth over your eyelids for 3-5 minutes. This helps loosen scales or crusts.  4. Wet the washcloth again as often as needed to keep it warm.  Repeat 2 or more times a day. Use a clean washcloth each time.    To use an eyelid scrub:  1. Wash your hands with soap and warm water.  2. Use a ready-made eyelid scrub. Or, mix 3 drops of baby shampoo in 1/4 cup of warm water.  3. Dip a lint-free pad, cotton swab, or clean washcloth in the scrub.  4. Close one eye and gently scrub the base of the eyelid.  5. Rinse the lid in cool water and dry with a clean towel.  6. Repeat on the other eye.

## 2018-11-06 NOTE — PROGRESS NOTES
Assessment & Plan   Nancy Benz is a 89 year old female who presents with:   Review of systems for the eyes was negative other than the pertinent positives and negatives noted in the HPI.  History is obtained from the patient and .     Chief Complaint   Patient presents with     Glaucoma Follow Up     Pt currently using Combigan BID OU and Lumbigan QHS OU.  Last gtt 8am this morning.  Compliant     Primary open angle glaucoma of both eyes, unspecified glaucoma stage  - Continue Combigan and lumigan  - IOP good today    Keratoconjunctivitis sicca, not specified as Sjogren's, left  - Recommend LH/WC 2-3 times daily  - Continue using Restasis and ATs    Return in about 6 months (around 5/6/2019) for Annual Eye Exam, Glaucoma check, OCT. (HVF no longer indicated per JS on 10/3/17)    Documentation for today's encounter was performed by Janeth NEGRON. OSC. Acting as a scribe in my presence. I have reviewed and verified that it is an accurate recording of today's encounter.    Attending Physician Attestation:  Complete documentation of historical and exam elements from today's encounter can be found in the full encounter summary report (not reduplicated in this progress note).  I personally obtained the chief complaint(s) and history of present illness.  I confirmed and edited as necessary the review of systems, past medical/surgical history, family history, social history, and examination findings as documented by others; and I examined the patient myself.  I personally reviewed the relevant tests, images, and reports as documented above.  I formulated and edited as necessary the assessment and plan and discussed the findings and management plan with the patient and family. - Lee Reyes MD

## 2018-11-06 NOTE — MR AVS SNAPSHOT
After Visit Summary   11/6/2018    Nancy Benz    MRN: [id number]           Patient Information     Date Of Birth          8/5/1922        Visit Information        Provider Department      11/6/2018 10:30 AM Lee Reyes MD;  OPHTH NURSE ONLY [facility]        Today's Diagnoses     Primary open angle glaucoma of both eyes, unspecified glaucoma stage    -  1    Keratoconjunctivitis sicca, not specified as Sjogren's, left          Care Instructions    Continue using lumigan at bedtime and combigan twice daily in both eyes  Do the lid hygiene and warm compresses at least twice daily if not more.    Good Lid Hygiene   To treat the problem, you need to keep your eyelids clean. Warm compresses help reduce redness and swelling and keep the eyelids clean. You may also need to clean your eyelids when you wake up.    To apply a warm compress:  1. Wash your hands with soap and warm water.  2. Wet a clean washcloth with warm water. Then wring it out.  3. Close your eyes and place the washcloth over your eyelids for 3-5 minutes. This helps loosen scales or crusts.  4. Wet the washcloth again as often as needed to keep it warm.  Repeat 2 or more times a day. Use a clean washcloth each time.    To use an eyelid scrub:  1. Wash your hands with soap and warm water.  2. Use a ready-made eyelid scrub. Or, mix 3 drops of baby shampoo in 1/4 cup of warm water.  3. Dip a lint-free pad, cotton swab, or clean washcloth in the scrub.  4. Close one eye and gently scrub the base of the eyelid.  5. Rinse the lid in cool water and dry with a clean towel.  6. Repeat on the other eye.            Follow-ups after your visit        Follow-up notes from your care team     Return in about 6 months (around 5/6/2019) for Annual Eye Exam, Glaucoma check, OCT.      Your next 10 appointments already scheduled     Nov 26, 2018  1:00 PM CST   TELEMEDICINE with Almaz Christopher, [facility]  [facility] ([facility])    [address]  [address]  [facility] [address] [phone]           Note: this is not an onsite visit; there is no need to come to the facility.            Apr 04, 2019  9:00 AM CDT   LAB with LAB FIRST FLOOR [facility] ([facility])    [address] [facility] [address] [phone]           Please do not eat 10-12 hours before your appointment if you are coming in fasting for labs on lipids, cholesterol, or glucose (sugar). This does not apply to pregnant women. Water, hot tea and black coffee (with nothing added) are okay. Do not drink other fluids, diet soda or chew gum.            Apr 04, 2019  9:30 AM CDT   PHYSICAL with Candy Trujillo MD PhD   [facility] ([facility])    [address] [facility] [address] [phone]            May 07, 2019 10:30 AM CDT   Return Visit with Lee Reyes MD, [facility] NURSE ONLY   [facility] ([facility])    [address] [place] [address] [phone]            Jun 20, 2019  2:00 PM CDT   Return Visit with Sobia Terrazas MD   [facility] ([facility])    [address] [place] [id number] [phone]              Who to contact     If you have questions or need follow up information about today's clinic visit or your schedule please contact [facility] directly at [phone].  Normal or non-critical lab and imaging results will be communicated to you by MyChart, letter or phone within 4 business days after the clinic has received the results. If you do not hear from us within 7 days, please contact the clinic through MyChart or phone. If you have a critical or abnormal lab result, we will notify you by phone as soon as possible.  Submit refill requests through [employer] or call your  pharmacy and they will forward the refill request to us. Please allow 3 business days for your refill to be completed.          Additional Information About Your Visit        [employer] Information     [employer] gives you secure access to your electronic health record. If you see a primary care provider, you can also send messages to your care team and make appointments. If you have questions, please call your primary care clinic.  If you do not have a primary care provider, please call [phone] and they will assist you.      [employer] is an electronic gateway that provides easy, online access to your medical records. With [employer], you can request a clinic appointment, read your test results, renew a prescription or communicate with your care team.     To access your existing account, please contact your [facility] or call [phone] for assistance.        Care EveryWhere ID     This is your Care EveryWhere ID. This could be used by other organizations to access your [place] medical records  [id number]         Blood Pressure from Last 3 Encounters:   09/19/18 90/60   09/04/18 128/65   08/22/18 134/64    Weight from Last 3 Encounters:   09/19/18 54.4 kg (120 lb)   07/31/18 53.1 kg (117 lb)   06/08/18 54.3 kg (119 lb 9.6 oz)              Today, you had the following     No orders found for display       Primary Care Provider Office Phone # Fax #    Candy Trujillo MD PhD [phone]       [address]  [facility] [address]        Equal Access to Services     CUATE FRANCO : Hadii aad ku hadasho Soomaali, waaxda luqadaha, qaybta kaalmada adeegyada, clark alfredo . So [facility] [phone].    ATENCIÓN: Si habla español, tiene a crandall disposición servicios gratuitos de asistencia lingüística. Llame al [phone].    We comply with applicable federal civil rights laws and [place] laws. We do not discriminate on the basis of race, color, national origin,  age, disability, sex, sexual orientation, or gender identity.            Thank you!     Thank you for choosing [facility]  for your care. Our goal is always to provide you with excellent care. Hearing back from our patients is one way we can continue to improve our services. Please take a few minutes to complete the written survey that you may receive in the mail after your visit with us. Thank you!             Your Updated Medication List - Protect others around you: Learn how to safely use, store and throw away your medicines at www.disposemymeds.org.          This list is accurate as of 11/6/18 10:56 AM.  Always use your most recent med list.                   Brand Name Dispense Instructions for use Diagnosis    * albuterol (2.5 MG/3ML) 0.083% neb solution      Take 1 vial by nebulization every 6 hours as needed for shortness of breath / dyspnea or wheezing        * albuterol 108 (90 Base) MCG/ACT inhaler    PROAIR HFA/PROVENTIL HFA/VENTOLIN HFA    3 Inhaler    Inhale 2 puffs into the lungs every 4 hours as needed for shortness of breath / dyspnea or wheezing    Moderate persistent asthma with exacerbation       bimatoprost 0.01 % Soln    LUMIGAN    3 Bottle    Place 1 drop into both eyes At Bedtime    Primary open angle glaucoma of both eyes, unspecified glaucoma stage       brimonidine-timolol 0.2-0.5 % ophthalmic solution    COMBIGAN    10 mL    Place 1 drop into both eyes 2 times daily    Primary open angle glaucoma of both eyes, moderate stage       CALCIUM 600 + D 600-200 MG-UNIT Tabs   Generic drug:  Calcium Carb-Cholecalciferol      Take 1 tablet by mouth daily Vitamin D=7954 iu    Keratoconjunctivitis sicca, not specified as Sjogren's, left, Glaucoma       cycloSPORINE 0.05 % ophthalmic emulsion    RESTASIS    1 Box    Place 1 drop into both eyes every 12 hours    Keratoconjunctivitis sicca, not specified as Sjogren's, left       diltiazem 120 MG 24 hr capsule    DILT-XR    90 capsule     TAKE 1 CAPSULE EVERY DAY    Essential hypertension with goal blood pressure less than 140/90       doxepin 10 MG/ML (HIGH CONC) solution    SINEquan    54 mL    Take 0.6 mLs (6 mg) by mouth At Bedtime    Chronic insomnia       FLUoxetine 20 MG capsule    PROzac    90 capsule    Take 1 capsule (20 mg) by mouth daily    Depression with anxiety       fluticasone 220 MCG/ACT Inhaler    FLOVENT HFA    36 g    Inhale 2 puffs into the lungs 2 times daily    Simple chronic bronchitis (H)       gabapentin 100 MG capsule    NEURONTIN    180 capsule    Take 2 capsules (200 mg) by mouth daily (late afternoon)    Restless leg syndrome       hydrocortisone 2.5 % ointment     30 g    Apply topically 2 times daily Apply twice per day up to 1 week, take week off .    Dermatitis       ipratropium - albuterol 0.5 mg/2.5 mg/3 mL 0.5-2.5 (3) MG/3ML    DUONEB     Take 1 vial by nebulization every 6 hours as needed for shortness of breath / dyspnea or wheezing        pravastatin 40 MG tablet    PRAVACHOL    45 tablet    TAKE 1/2 TABLET EVERY DAY    Hyperlipidemia LDL goal <100       Respiratory Therapy Supplies Deja     1 each    Optichamber or equivalent to use with spacer.    Moderate persistent asthma without complication       senna-docusate 8.6-50 MG per tablet    SENOKOT-S;PERICOLACE     Take 1 tablet by mouth daily        SYSTANE 0.4-0.3 % Soln ophthalmic solution   Generic drug:  polyethylene glycol 0.4%- propylene glycol 0.3%      Place 1 drop into both eyes 3 times daily as needed for dry eyes    Keratoconjunctivitis sicca, not specified as Sjogren's, left, Glaucoma       * Notice:  This list has 2 medication(s) that are the same as other medications prescribed for you. Read the directions carefully, and ask your doctor or other care provider to review them with you.

## 2018-11-07 ENCOUNTER — TELEPHONE (OUTPATIENT)
Dept: PEDIATRICS | Facility: CLINIC | Age: 83
End: 2018-11-07

## 2018-11-07 DIAGNOSIS — F51.04 CHRONIC INSOMNIA: ICD-10-CM

## 2018-11-07 RX ORDER — DOXEPIN HYDROCHLORIDE 10 MG/ML
6 SOLUTION ORAL AT BEDTIME
Qty: 54 ML | Refills: 3 | Status: CANCELLED | OUTPATIENT
Start: 2018-11-07

## 2018-11-07 NOTE — TELEPHONE ENCOUNTER
[facility] Call Center    Phone Message    May a detailed message be left on voicemail: yes    Reason for Call: Other: Shani pt's home care nurse would like a call back to discuss doxepin (SINEQUAN) 10 MG/ML (HIGH CONC) solution [7041] (Order [id number]) . States pt is having trouble sleeping and would like to know if the dosages need to be changed. Please call [phone] to discuss.      Action Taken: Message routed to:  Primary Care p [phone]

## 2018-11-07 NOTE — TELEPHONE ENCOUNTER
Routed to Dr. Trujillo to advise.    Reason for Call: Other: Shani pt's home care nurse would like a call back to discuss doxepin (SINEQUAN) 10 MG/ML (HIGH CONC) solution [7041] (Order [id number]) . States pt is having trouble sleeping and would like to know if the dosages need to be changed. Please call [phone]     Katie Prater RN,   [facility]

## 2018-11-08 NOTE — TELEPHONE ENCOUNTER
Please triage pt symptoms: how many days does the doxepin help and how often it does not. 6 mg is the max dose recommended. I can check with our pharmacist what other meds we can consider adding to the Doxepin to help with sleep if needed.

## 2018-11-08 NOTE — TELEPHONE ENCOUNTER
Left message for home care nurse Shani to return call to clinic and ask to speak with RN.      Katie Prater RN,   [facility]

## 2018-11-09 NOTE — TELEPHONE ENCOUNTER
Routed Pinchd message to .      Created: 11/9/2018 11:21 AM          Hi -     Thanks for the reply. I talked to Mom, and she takes the Doxepin every night. It only works about every third night (and maybe by then she is so exhausted that she would have slept without it, who knows?). When it doesn't work, it doesn't work AT ALL. Every 3 or 4 nights she also takes a Melatonin along with the Doxepin, but that doesn't seem to help a  lot, either.      She gets through the days, though, and plays Scrabble (and usually wins!) and does other activities. She can't nap during the day.     If you guys can figure out a solution, you deserve a Robert prize. Good luck.     And just ANN MARIE, the [facility] nurse is Polina, not Shani.     Thanks again for your efforts to deal with this.      Kady

## 2018-11-09 NOTE — TELEPHONE ENCOUNTER
2nd attempt:    Fisgot message sent to daughter Kady with 's questions.    Katie Prater RN,   [facility]

## 2018-11-13 NOTE — TELEPHONE ENCOUNTER
SHREYAS Fish from [facility] called to check on the status of the sleep medication.     (To clarify, the nurses name is Polina, not Shani.)    Almaz Mclean RN, [facility]

## 2018-11-14 RX ORDER — MIRTAZAPINE 7.5 MG/1
7.5 TABLET, FILM COATED ORAL AT BEDTIME
Qty: 30 TABLET | Refills: 0 | Status: SHIPPED | OUTPATIENT
Start: 2018-11-14 | End: 2018-11-26

## 2018-11-14 NOTE — TELEPHONE ENCOUNTER
Informed Polina, home care RN, of Dr. Trujillo's note below.  She states she doesn't know if patient had side effects from the remeron when she took it last.  She will also speak with daughter Kady to see what they would like to do about the sleep medications.    Katie Prater RN,   [facility]

## 2018-11-14 NOTE — TELEPHONE ENCOUNTER
I talked with our MTM pharmacist. After reviewing the meds she has tried in the past, we have not come up with any new alternatives given her age. One thought we have is to retry one that she has used before. Remeron (Mirtazapine) 7.5 mg is a fairly safe one to try along with Melatonin but not with Doxepin together. I sent a note to her daughter as such. Please notify Polina. She can check with patient if she recalls any side effect in the past. It is not listed as a side effect. Rx sent.

## 2018-11-20 NOTE — TELEPHONE ENCOUNTER
Called Polina and gave her the message from  that this has been sent on the morning of 11/14/18 to [facility].  Reminded that this medication cannot be taken together with Doxepin.      Polina is at patient's home tomorrow to set up medications.  If the medication Remeron has not been received, they will have to wait until the next home care visit next week.    Almaz Mclean RN, [facility]

## 2018-11-20 NOTE — TELEPHONE ENCOUNTER
Polina, homecare nurse called in stating pt would like to try the Remeron. Please send prescription to [facility] for the pharmacy. Please call Polina at [phone] when the prescription has been sent and with any questions.

## 2018-11-26 ENCOUNTER — ALLIED HEALTH/NURSE VISIT (OUTPATIENT)
Dept: PHARMACY | Facility: CLINIC | Age: 83
End: 2018-11-26
Payer: COMMERCIAL

## 2018-11-26 DIAGNOSIS — F51.04 CHRONIC INSOMNIA: ICD-10-CM

## 2018-11-26 DIAGNOSIS — I10 ESSENTIAL HYPERTENSION, BENIGN: ICD-10-CM

## 2018-11-26 DIAGNOSIS — M51.16 LUMBAR DISC HERNIATION WITH RADICULOPATHY: Primary | ICD-10-CM

## 2018-11-26 DIAGNOSIS — G47.00 INSOMNIA, UNSPECIFIED TYPE: Primary | ICD-10-CM

## 2018-11-26 PROCEDURE — 99606 MTMS BY PHARM EST 15 MIN: CPT | Performed by: PHARMACIST

## 2018-11-26 PROCEDURE — 99607 MTMS BY PHARM ADDL 15 MIN: CPT | Performed by: PHARMACIST

## 2018-11-26 NOTE — MR AVS SNAPSHOT
After Visit Summary   11/26/2018    Nancy Benz    MRN: [id number]           Patient Information     Date Of Birth          8/5/1922        Visit Information        Provider Department      11/26/2018 1:00 PM Almaz Christopher, [facility]        Today's Diagnoses     Insomnia, unspecified type    -  1    Essential hypertension, benign           Follow-ups after your visit        Follow-up notes from your care team     Return in about 2 weeks (around 12/10/2018) for Medication Therapy Management Visit.      Your next 10 appointments already scheduled     Dec 10, 2018  1:00 PM CST   TELEMEDICINE with Almaz Christopher [facility] ([facility])    [address] [place] [address] [address]  [facility] [id number]  [facility] [id number] [phone]           Note: this is not an onsite visit; there is no need to come to the facility.            Dec 11, 2018  1:00 PM CST   (Arrive by 12:45 PM)   XR LUMBAR EPIDURAL INJECTION with MGXR3, MG NEURO RAD   [facility]    [address] [place] [address] [address]  [facility] [id number] [phone]           How do I prepare for my exam? (Food and drink instructions) No Food and Drink Restrictions.  How do I prepare for my exam? (Other instructions) * If you take Coumadin (or any other blood thinners) you may need to stop taking them up to 5 days prior to the exam. Talk to your doctor before stopping. * If you take Plavix, Ticlid, Pletal or Persantine, please ask your doctor if you should stop these medicines. You may need extra tests on the morning of your scan. * If you take Xarelto (Rivaroxaban), you may need to stop taking it 24 hours before treatment. Talk to your doctor before stopping this medicine.  What should I wear: Wear comfortable clothes.  How long does the exam take: Injections take about 30 to 45 minutes. Most people spend up to 2 hours in the clinic or hospital.   What should I bring: Please bring any scans or X-rays taken at other hospitals, if similar tests were done. Also bring a list of your medicines, including vitamins, minerals and over-the-counter drugs. It is safest to leave personal items at home. You will need a  : Plan to have someone drive you home afterward.  What do I need to tell my doctor: Tell your doctor in advance: * If you are allergic to X-ray dye (contrast fluid). * If you may be pregnant.  What should I do after the exam: You may have slight cramping during this exam. The cramps should go away afterward. You may have some spotting after the exam.  What is this test: A spinal shot is done in or near the spine. You may receive steroid medicine (to reduce swelling) or contrast fluid (dye that makes the area show up more clearly on X-rays). A nerve root shot is a shot into the nerve near the spine. It may reduce inflammation near the nerve root or spinal cord and reduce pain in the arm or leg.  Who should I call with questions: If you have any questions, please call the Imaging Department where you will have your exam. Directions, parking instructions, and other information is available on our website, [email or web address].            Apr 04, 2019  9:00 AM CDT   LAB with LAB FIRST FLOOR [facility])    [address] [place] [id number] [phone]           Please do not eat 10-12 hours before your appointment if you are coming in fasting for labs on lipids, cholesterol, or glucose (sugar). This does not apply to pregnant women. Water, hot tea and black coffee (with nothing added) are okay. Do not drink other fluids, diet soda or chew gum.            Apr 04, 2019  9:30 AM CDT   PHYSICAL with Candy Trujillo MD PhD   Formerly named [facility])    [address] [place] [address]   [phone]            May 07, 2019 10:30 AM CDT   Return  Visit with Lee Reyes MD, MG OPHTH NURSE ONLY   [facility] ([facility])    [id number] [facility] [address] [phone]            Jun 20, 2019  2:00 PM CDT   Return Visit with Sobia Terrazas MD   [facility] ([facility])    [address] [facility] [address] [phone]              Future tests that were ordered for you today     Open Future Orders        Priority Expected Expires Ordered    X-ray Lumbar epidural injection Routine 11/26/2018 11/26/2019 11/26/2018            Who to contact     If you have questions or need follow up information about today's clinic visit or your schedule please contact [facility] directly at [phone].  Normal or non-critical lab and imaging results will be communicated to you by MyChart, letter or phone within 4 business days after the clinic has received the results. If you do not hear from us within 7 days, please contact the clinic through [employer] or phone. If you have a critical or abnormal lab result, we will notify you by phone as soon as possible.  Submit refill requests through [employer] or call your pharmacy and they will forward the refill request to us. Please allow 3 business days for your refill to be completed.          Additional Information About Your Visit        MyChart Information     [employer] gives you secure access to your electronic health record. If you see a primary care provider, you can also send messages to your care team and make appointments. If you have questions, please call your primary care clinic.  If you do not have a primary care provider, please call [phone] and they will assist you.        Care EveryWhere ID     This is your Care EveryWhere ID. This could be used by other organizations to access your [place] medical records  [id number]         Blood Pressure from Last 3 Encounters:   09/19/18 90/60    09/04/18 128/65   08/22/18 134/64    Weight from Last 3 Encounters:   09/19/18 120 lb (54.4 kg)   07/31/18 117 lb (53.1 kg)   06/08/18 119 lb 9.6 oz (54.3 kg)              Today, you had the following     No orders found for display         Today's Medication Changes          These changes are accurate as of 11/26/18  1:39 PM.  If you have any questions, ask your nurse or doctor.               Stop taking these medicines if you haven't already. Please contact your care team if you have questions.     doxepin 10 MG/ML (HIGH CONC) solution   Commonly known as:  SINEquan   Stopped by:  Almaz Christopher, [facility]                    Primary Care Provider Office Phone # Fax #    Candy Trujillo MD PhD [phone] [address]  [facility] [address]        Equal Access to Services     [facility] Hospital[facility] : Hadii aad ku hadasho Soomaali, waaxda luqadaha, qaybta kaalmada adeegyada, waxay idiin hayaan radha kharafrancisca cobosn . So [facility] [phone].    ATENCIÓN: Si habla español, tiene a crandall disposición servicios gratuitos de asistencia lingüística. Mark al [phone].    We comply with applicable federal civil rights laws and [place] laws. We do not discriminate on the basis of race, color, national origin, age, disability, sex, sexual orientation, or gender identity.            Thank you!     Thank you for choosing [facility]  for your care. Our goal is always to provide you with excellent care. Hearing back from our patients is one way we can continue to improve our services. Please take a few minutes to complete the written survey that you may receive in the mail after your visit with us. Thank you!             Your Updated Medication List - Protect others around you: Learn how to safely use, store and throw away your medicines at www.disposemymeds.org.          This list is accurate as of 11/26/18  1:39 PM.  Always use your most recent med list.                   Brand Name Dispense  Instructions for use Diagnosis    * albuterol (2.5 MG/3ML) 0.083% neb solution    PROVENTIL     Take 1 vial by nebulization every 6 hours as needed for shortness of breath / dyspnea or wheezing        * albuterol 108 (90 Base) MCG/ACT inhaler    PROAIR HFA/PROVENTIL HFA/VENTOLIN HFA    3 Inhaler    Inhale 2 puffs into the lungs every 4 hours as needed for shortness of breath / dyspnea or wheezing    Moderate persistent asthma with exacerbation       bimatoprost 0.01 % Soln    LUMIGAN    3 Bottle    Place 1 drop into both eyes At Bedtime    Primary open angle glaucoma of both eyes, unspecified glaucoma stage       brimonidine-timolol 0.2-0.5 % ophthalmic solution    COMBIGAN    10 mL    Place 1 drop into both eyes 2 times daily    Primary open angle glaucoma of both eyes, moderate stage       CALCIUM 600 + D 600-200 MG-UNIT Tabs   Generic drug:  Calcium Carb-Cholecalciferol      Take 1 tablet by mouth daily Vitamin H=8232 iu    Keratoconjunctivitis sicca, not specified as Sjogren's, left, Glaucoma       cycloSPORINE 0.05 % ophthalmic emulsion    RESTASIS    1 Box    Place 1 drop into both eyes every 12 hours    Keratoconjunctivitis sicca, not specified as Sjogren's, left       diltiazem 120 MG 24 hr capsule    DILT-XR    90 capsule    TAKE 1 CAPSULE EVERY DAY    Essential hypertension with goal blood pressure less than 140/90       FLUoxetine 20 MG capsule    PROzac    90 capsule    Take 1 capsule (20 mg) by mouth daily    Depression with anxiety       fluticasone 220 MCG/ACT inhaler    FLOVENT HFA    36 g    Inhale 2 puffs into the lungs 2 times daily    Simple chronic bronchitis (H)       gabapentin 100 MG capsule    NEURONTIN    180 capsule    Take 2 capsules (200 mg) by mouth daily (late afternoon)    Restless leg syndrome       hydrocortisone 2.5 % ointment     30 g    Apply topically 2 times daily Apply twice per day up to 1 week, take week off .    Dermatitis       ipratropium - albuterol 0.5 mg/2.5 mg/3 mL  0.5-2.5 (3) MG/3ML    DUONEB     Take 1 vial by nebulization every 6 hours as needed for shortness of breath / dyspnea or wheezing        mirtazapine 7.5 MG tablet    REMERON    30 tablet    Take 1 tablet (7.5 mg) by mouth At Bedtime    Chronic insomnia       pravastatin 40 MG tablet    PRAVACHOL    45 tablet    TAKE 1/2 TABLET EVERY DAY    Hyperlipidemia LDL goal <100       Respiratory Therapy Supplies Deja     1 each    Optichamber or equivalent to use with spacer.    Moderate persistent asthma without complication       senna-docusate 8.6-50 MG per tablet    SENOKOT-S;PERICOLACE     Take 1 tablet by mouth daily        SYSTANE 0.4-0.3 % Soln ophthalmic solution   Generic drug:  polyethylene glycol 0.4%- propylene glycol 0.3%      Place 1 drop into both eyes 3 times daily as needed for dry eyes    Keratoconjunctivitis sicca, not specified as Sjogren's, left, Glaucoma       * Notice:  This list has 2 medication(s) that are the same as other medications prescribed for you. Read the directions carefully, and ask your doctor or other care provider to review them with you.

## 2018-11-26 NOTE — PROGRESS NOTES
SUBJECTIVE/OBJECTIVE:                           Nancy Benz is a 89 year old female called for a follow-up visit for Medication Therapy Management.  She was referred to me from Candy Trujillo.     Chief Complaint: Medication Review. Insomnia follow up from 8/27/2018.    Allergies/ADRs: Reviewed in Epic  Tobacco: No tobacco use  Alcohol: 7-9 beverages / week; bottle of beer a day  Caffeine: 1 cups/day of decoffee  Activity: walks the hallway and uses a NuStep machine 4 times a week for 30 minutes  PMH: Reviewed in [facility]    Patient has a nurse that comes into the home every 2 weeks and sets up her medications.  [phone]     Insomnia: Current medications include: doxepin was discontinued because it didn't seem to be working for her. Patient was changed to mirtazapine 7.5mg at night. Patient is taking mirtazapine around 9:30 and it takes her 3 hours to fall asleep and then around 3am she will fall asleep and will sleep soundly until about 11am. Patient is unsure if she has been taking the mirtazapine. Patient does feel drowsy in the morning.     Hypertension: Current medications include diltiazem XR 120mg daily.  Patient has BP monitored by home health care nurse.  Patient reports the following medication side effects: dizziness but patient has had this most of her life. Patient walks with a walker and has not fallen.    ASSESSMENT:                             Current medications were reviewed today.     Medication Adherence: good, no issues identified    Insomnia: Stable; Talked with patient's home health care nurse Polina and mirtazapine was placed in patient's med box last week 11/21. New mirtazapine start will evaluate effectiveness in 2 weeks.    Hypertension: Needs improvement. Patient's blood pressure in clinic 9/19/18 was 90/60mmHg. Readings from home health care nurse 11/7/18 105/63mmHg and 11/21 100/51mmHg. Patient has had dizziness most of her life but low blood pressure could be contributing.  Could consider decreasing diltiazem dose.     PLAN:                            Could consider decreasing diltiazem dose. Will discuss with Dr. Trujillo.    I spent 30 minutes with this patient today. All changes were made via collaborative practice agreement with Candy Trujillo. A copy of the visit note was provided to the patient's primary care provider.    Will follow up in 2 weeks.    The patient was sent via [employer] a summary of these recommendations as an after visit summary.     Almaz Christopher, Pharm.D, BCACP  Medication Therapy Management Pharmacist

## 2018-11-26 NOTE — Clinical Note
Nancy Saha's blood pressure has been running between 100-105/51, 63mmHg with home health care nurse. Would you consider decreasing her diltiazem dose?

## 2018-11-27 RX ORDER — MIRTAZAPINE 7.5 MG/1
TABLET, FILM COATED ORAL
Qty: 30 TABLET | Refills: 0 | Status: SHIPPED | OUTPATIENT
Start: 2018-11-27 | End: 2019-01-07

## 2018-11-27 NOTE — TELEPHONE ENCOUNTER
Refilled per protocol.    Lorraine Crespo RN   .[facility] [place]

## 2018-11-28 NOTE — PROGRESS NOTES
Ok to stop diltiazem per Dr. Trujillo.  Almaz Christopher, Pharm.D, [facility]  Medication Therapy Management Pharmacist

## 2018-11-29 ENCOUNTER — TELEPHONE (OUTPATIENT)
Dept: PEDIATRICS | Facility: CLINIC | Age: 83
End: 2018-11-29

## 2018-11-29 NOTE — TELEPHONE ENCOUNTER
[facility] Call Center    Phone Message    May a detailed message be left on voicemail: yes    Reason for Call: Other: RN from [facility] is calling to get verbal orders verifying if  Rx DILT- MG 24 hr capsule [41564] (Order [id number]) has been discontinued. Please advise.       Action Taken: Message routed to:  Primary Care p [phone]

## 2018-11-29 NOTE — TELEPHONE ENCOUNTER
Called Polina and verified this medication was stopped. Please refer to MTM notes from 11/28/18.    Lorraine Crespo RN   .[facility]

## 2018-11-30 DIAGNOSIS — Z53.9 DIAGNOSIS NOT YET DEFINED: Primary | ICD-10-CM

## 2018-11-30 PROCEDURE — G0179 MD RECERTIFICATION HHA PT: HCPCS | Performed by: INTERNAL MEDICINE

## 2018-12-07 ENCOUNTER — TELEPHONE (OUTPATIENT)
Dept: PEDIATRICS | Facility: CLINIC | Age: 83
End: 2018-12-07

## 2018-12-07 NOTE — TELEPHONE ENCOUNTER
Called Polina and gave message per Dr. Trujillo.  She  verbalized understanding and agreement to plan.    Scheduled patient for 8:50 am Tuesday 12/11/18  Almaz Mclean RN, [facility]

## 2018-12-07 NOTE — TELEPHONE ENCOUNTER
Health Call Center    Phone Message    May a detailed message be left on voicemail: yes    Reason for Call: Symptoms or Concerns     If patient has red-flag symptoms, warm transfer to triage line    Current symptom or concern: Wheezing    Symptoms have been present for:  1 day(s)    Has patient previously been seen for this? Yes     Polina pt's home care nurse is requesting a call back to discuss some wheezing she heard in the pt yesterday. Polina states pt said her asthma always acts up around this time of year. Polina would like to know if this is something that Dr. Trujillo would like to see her in the clinic for. Please call [phone] to discuss.       Action Taken: Message routed to:  Primary Care p [phone]

## 2018-12-07 NOTE — TELEPHONE ENCOUNTER
I recommend the patient continue with DuoNeb.  Increase the Flovent to 4 puffs twice a day for 5 days.  If she feels better by then, go back down to the normal dose, 2 puffs twice a day.  Scheduled for follow-up appointment for next Tuesday.

## 2018-12-07 NOTE — TELEPHONE ENCOUNTER
Called Polina, Home Care RN.  She states patient gets an asthma flare up every year at this time. She does not want to end up in the hospital.  She is feeling ok, no retractions or worsening SOB.      She is using the DUONEBs as needed.    Routing to Dr. Trujillo to advise on any other suggestions for patient.    Almaz Mclean RN, [facility]

## 2018-12-10 ENCOUNTER — ALLIED HEALTH/NURSE VISIT (OUTPATIENT)
Dept: PHARMACY | Facility: CLINIC | Age: 83
End: 2018-12-10
Payer: COMMERCIAL

## 2018-12-10 DIAGNOSIS — G47.00 INSOMNIA, UNSPECIFIED TYPE: Primary | ICD-10-CM

## 2018-12-10 DIAGNOSIS — J45.30 MILD PERSISTENT ASTHMA WITHOUT COMPLICATION: ICD-10-CM

## 2018-12-10 DIAGNOSIS — I10 ESSENTIAL HYPERTENSION, BENIGN: ICD-10-CM

## 2018-12-10 PROCEDURE — 99607 MTMS BY PHARM ADDL 15 MIN: CPT | Performed by: PHARMACIST

## 2018-12-10 PROCEDURE — 99606 MTMS BY PHARM EST 15 MIN: CPT | Performed by: PHARMACIST

## 2018-12-10 RX ORDER — PREDNISONE 20 MG/1
40 TABLET ORAL DAILY
COMMUNITY
Start: 2018-12-09 | End: 2019-05-07

## 2018-12-10 RX ORDER — IPRATROPIUM BROMIDE AND ALBUTEROL SULFATE 2.5; .5 MG/3ML; MG/3ML
3 SOLUTION RESPIRATORY (INHALATION) 4 TIMES DAILY
COMMUNITY
Start: 2017-12-12 | End: 2018-12-10

## 2018-12-10 NOTE — PATIENT INSTRUCTIONS
Recommendations from today's MTM visit:                                                        Recommend increasing duoneb use to four times daily as previously recommended.  Recommend discontinuing Advil PM.    Next MTM visit: 1 month    To schedule another MTM appointment, please call the clinic directly or you may call the MTM scheduling line at [phone] or toll-free at [phone].     My Clinical Pharmacist's contact information:                                                      It was a pleasure talking with you today!  Please feel free to contact me with any questions or concerns you have.      Almaz Christopher, Pharm.D, [facility]  Medication Therapy Management Pharmacist      You may receive a survey about the MTM services you received.  I would appreciate your feedback to help me serve you better in the future. Please fill it out and return it when you can. Your comments will be anonymous.

## 2018-12-10 NOTE — PROGRESS NOTES
SUBJECTIVE/OBJECTIVE:                           Nancy Benz is a 89 year old female called for a follow-up visit for Medication Therapy Management.  She was referred to me from Candy Trujillo.     Chief Complaint: Medication Review. Insomnia follow up from 11/26/2018.    Allergies/ADRs: Reviewed in Epic  Tobacco: No tobacco use  Alcohol: 7-9 beverages / week; bottle of beer a day  Caffeine: 1 cups/day of decoffee  Activity: walks the hallway and uses a NuStep machine 4 times a week for 30 minutes  PMH: Reviewed in [facility]    Patient has a nurse that comes into the home every 2 weeks and sets up her medications.  [phone]    Asthma:  Current asthma medications: Albuterol+Ipratropium Nebs three times daily per Dr. Trujillo; patient's UC instructions from yesterday is 4 times daily, Fluticasone (Flovent) 4 puffs twice daily (per Dr. Trujillo on 12/7) and Prednisone 40mg once daily for 5 days. Patient is currently having an asthma flare and was seen in urgent care 12/9. Pt rinses their mouth after using steroid inhaler. Asthma triggers include: upper respiratory infections.  Pt reports the following symptoms: shortness of breath  AAP on file: YES; needs to be updated.  PIF was completed today: No   Patient has follow-up with PCP tomorrow; reports symptoms are about the same as yesterday and no worse.    Insomnia: Current medications include:  Patient was changed to mirtazapine 7.5mg at night. Patient feels like her sleep has been ok. Patient does state she does take an Advil PM every night. Patient states that she does sleep while taking Advil PM.     Hypertension: Diltiazem was discontinued at the last visit. Per patient her last BP was ok per home care nurse. Patient has not yet noticed a decrease in her dizziness.    ASSESSMENT:                             Current medications were reviewed today.     Medication Adherence: good, no issues identified    Asthma: Needs improvement; patient is currently experiencing a  flare. Educated patient that if symptoms worsen to go to the ER. Patient has follow-up with PCP tomorrow. Also recommended patient use duoneb four times daily as recommended by UC provider yesterday.    Insomnia: Needs improvement. Recommend patient discontinue using Advil PM due to risk associated with use.    Hypertension: Stable    PLAN:                            Recommend increasing duoneb use to four times daily as previously recommended.  Recommend discontinuing Advil PM.    I spent 30 minutes with this patient today. All changes were made via collaborative practice agreement with Candy Trujillo. A copy of the visit note was provided to the patient's primary care provider.    Will follow up in 2 weeks.    The patient was sent via [employer] a summary of these recommendations as an after visit summary.     Almaz Christopher, Pharm.D, BCACP  Medication Therapy Management Pharmacist

## 2018-12-11 ENCOUNTER — ANCILLARY PROCEDURE (OUTPATIENT)
Dept: GENERAL RADIOLOGY | Facility: CLINIC | Age: 83
End: 2018-12-11
Attending: PREVENTIVE MEDICINE
Payer: COMMERCIAL

## 2018-12-11 ENCOUNTER — OFFICE VISIT (OUTPATIENT)
Dept: PEDIATRICS | Facility: CLINIC | Age: 83
End: 2018-12-11
Payer: COMMERCIAL

## 2018-12-11 VITALS
SYSTOLIC BLOOD PRESSURE: 128 MMHG | WEIGHT: 129 LBS | TEMPERATURE: 97 F | DIASTOLIC BLOOD PRESSURE: 69 MMHG | OXYGEN SATURATION: 94 % | HEART RATE: 65 BPM | BODY MASS INDEX: 25.19 KG/M2

## 2018-12-11 VITALS — DIASTOLIC BLOOD PRESSURE: 67 MMHG | SYSTOLIC BLOOD PRESSURE: 160 MMHG | HEART RATE: 70 BPM | OXYGEN SATURATION: 91 %

## 2018-12-11 DIAGNOSIS — M51.16 LUMBAR DISC HERNIATION WITH RADICULOPATHY: ICD-10-CM

## 2018-12-11 DIAGNOSIS — F51.04 CHRONIC INSOMNIA: ICD-10-CM

## 2018-12-11 DIAGNOSIS — J45.41 MODERATE PERSISTENT ASTHMA WITH EXACERBATION: Primary | ICD-10-CM

## 2018-12-11 PROCEDURE — 99214 OFFICE O/P EST MOD 30 MIN: CPT | Performed by: INTERNAL MEDICINE

## 2018-12-11 PROCEDURE — 62323 NJX INTERLAMINAR LMBR/SAC: CPT | Performed by: RADIOLOGY

## 2018-12-11 RX ORDER — METHYLPREDNISOLONE ACETATE 80 MG/ML
80 INJECTION, SUSPENSION INTRA-ARTICULAR; INTRALESIONAL; INTRAMUSCULAR; SOFT TISSUE ONCE
Status: COMPLETED | OUTPATIENT
Start: 2018-12-11 | End: 2018-12-11

## 2018-12-11 RX ORDER — BUPIVACAINE HYDROCHLORIDE 2.5 MG/ML
5 INJECTION, SOLUTION INFILTRATION; PERINEURAL ONCE
Status: COMPLETED | OUTPATIENT
Start: 2018-12-11 | End: 2018-12-11

## 2018-12-11 RX ORDER — IOPAMIDOL 408 MG/ML
10 INJECTION, SOLUTION INTRATHECAL ONCE
Status: COMPLETED | OUTPATIENT
Start: 2018-12-11 | End: 2018-12-11

## 2018-12-11 RX ADMIN — BUPIVACAINE HYDROCHLORIDE 10 MG: 2.5 INJECTION, SOLUTION INFILTRATION; PERINEURAL at 12:46

## 2018-12-11 RX ADMIN — METHYLPREDNISOLONE ACETATE 80 MG: 80 INJECTION, SUSPENSION INTRA-ARTICULAR; INTRALESIONAL; INTRAMUSCULAR; SOFT TISSUE at 12:47

## 2018-12-11 RX ADMIN — IOPAMIDOL 2 ML: 408 INJECTION, SOLUTION INTRATHECAL at 12:47

## 2018-12-11 NOTE — PATIENT INSTRUCTIONS
Make appointment(s) for:   -- follow up next week.       Complete Prednisone burst.  Continue with Flovent and albuterol neb.       Use Tylenol PM to help with sleep, avoid Advil PM.

## 2018-12-11 NOTE — DISCHARGE SUMMARY
AFTER YOU GO HOME    ? DO relax; minimize your activity for 24 hours  ? You may resume normal activity tomorrow  ? You may remove the bandage in the evening or next morning  ? You may resume bathing the next day  ? Drink at least 4 extra glasses of fluid today if not on fluid restrictions  ? DO NOT drive or operate machinery at home or at work for at least 24 hours      VISIT THE EMERGENCY ROOM OR URGENT CARE IF:    ? There is redness or swelling at the injection site  ? There is discharge from the injection site  ? You develop a temperature of 101  F or greater      ADDITIONAL INSTRUCTIONS:     ? You may resume your Coumadin or other blood thinner at your regular dose today.  Follow up with your physician to have your INR rechecked if indicated.  ? If you gain no relief from the injection after two (2) weeks, follow-up with your provider for your options.        Contacts:    During business hours from 8 to 5 pm, you may call [phone] to reach a nurse advisor at [facility].  After hours, call [facility]  [phone].  Ask for the Radiologist on-call.  Someone is on-call 24 hrs/day.  [facility] Toll Free Number   [phone]

## 2018-12-11 NOTE — LETTER
My Depression Action Plan  Name: Nancy Benz   Date of Birth 8/5/1922  Date: 12/11/2018    My doctor: Candy Trujillo   My clinic: [address] [address] [phone]          GREEN    ZONE   Good Control    What it looks like:     Things are going generally well. You have normal up s and down s. You may even feel depressed from time to time, but bad moods usually last less than a day.   What you need to do:  1. Continue to care for yourself (see self care plan)  2. Check your depression survival kit and update it as needed  3. Follow your physician s recommendations including any medication.  4. Do not stop taking medication unless you consult with your physician first.           YELLOW         ZONE Getting Worse    What it looks like:     Depression is starting to interfere with your life.     It may be hard to get out of bed; you may be starting to isolate yourself from others.    Symptoms of depression are starting to last most all day and this has happened for several days.     You may have suicidal thoughts but they are not constant.   What you need to do:     1. Call your care team, your response to treatment will improve if you keep your care team informed of your progress. Yellow periods are signs an adjustment may need to be made.     2. Continue your self-care, even if you have to fake it!    3. Talk to someone in your support network    4. Open up your depression survival kit           RED    ZONE Medical Alert - Get Help    What it looks like:     Depression is seriously interfering with your life.     You may experience these or other symptoms: You can t get out of bed most days, can t work or engage in other necessary activities, you have trouble taking care of basic hygiene, or basic responsibilities, thoughts of suicide or death that will not go away, self-injurious behavior.     What you need to do:  1. Call your care team and  request a same-day appointment. If they are not available (weekends or after hours) call your local crisis line, emergency room or 911.            Depression Self Care Plan / Survival Kit    Self-Care for Depression  Here s the deal. Your body and mind are really not as separate as most people think.  What you do and think affects how you feel and how you feel influences what you do and think. This means if you do things that people who feel good do, it will help you feel better.  Sometimes this is all it takes.  There is also a place for medication and therapy depending on how severe your depression is, so be sure to consult with your medical provider and/ or Behavioral Health Consultant if your symptoms are worsening or not improving.     In order to better manage my stress, I will:    Exercise  Get some form of exercise, every day. This will help reduce pain and release endorphins, the  feel good  chemicals in your brain. This is almost as good as taking antidepressants!  This is not the same as joining a gym and then never going! (they count on that by the way ) It can be as simple as just going for a walk or doing some gardening, anything that will get you moving.      Hygiene   Maintain good hygiene (Get out of bed in the morning, Make your bed, Brush your teeth, Take a shower, and Get dressed like you were going to work, even if you are unemployed).  If your clothes don't fit try to get ones that do.    Diet  I will strive to eat foods that are good for me, drink plenty of water, and avoid excessive sugar, caffeine, alcohol, and other mood-altering substances.  Some foods that are helpful in depression are: complex carbohydrates, B vitamins, flaxseed, fish or fish oil, fresh fruits and vegetables.    Psychotherapy  I agree to participate in Individual Therapy (if recommended).    Medication  If prescribed medications, I agree to take them.  Missing doses can result in serious side effects.  I understand that  drinking alcohol, or other illicit drug use, may cause potential side effects.  I will not stop my medication abruptly without first discussing it with my provider.    Staying Connected With Others  I will stay in touch with my friends, family members, and my primary care provider/team.    Use your imagination  Be creative.  We all have a creative side; it doesn t matter if it s oil painting, sand castles, or mud pies! This will also kick up the endorphins.    Witness Beauty  (AKA stop and smell the roses) Take a look outside, even in mid-winter. Notice colors, textures. Watch the squirrels and birds.     Service to others  Be of service to others.  There is always someone else in need.  By helping others we can  get out of ourselves  and remember the really important things.  This also provides opportunities for practicing all the other parts of the program.    Humor  Laugh and be silly!  Adjust your TV habits for less news and crime-drama and more comedy.    Control your stress  Try breathing deep, massage therapy, biofeedback, and meditation. Find time to relax each day.     My support system    Clinic Contact:  Phone number:    Contact 1:  Phone number:    Contact 2:  Phone number:    [employer]eterian/:  Phone number:    Therapist:  Phone number:    Local crisis center:    Phone number:    Other community support:  Phone number:

## 2018-12-18 ENCOUNTER — OFFICE VISIT (OUTPATIENT)
Dept: PEDIATRICS | Facility: CLINIC | Age: 83
End: 2018-12-18
Payer: COMMERCIAL

## 2018-12-18 VITALS
SYSTOLIC BLOOD PRESSURE: 139 MMHG | TEMPERATURE: 96.8 F | OXYGEN SATURATION: 92 % | HEART RATE: 63 BPM | DIASTOLIC BLOOD PRESSURE: 72 MMHG | WEIGHT: 127 LBS | BODY MASS INDEX: 24.8 KG/M2

## 2018-12-18 DIAGNOSIS — F51.04 CHRONIC INSOMNIA: Primary | ICD-10-CM

## 2018-12-18 PROCEDURE — 99214 OFFICE O/P EST MOD 30 MIN: CPT | Performed by: INTERNAL MEDICINE

## 2018-12-18 NOTE — PATIENT INSTRUCTIONS
Make appointment(s) for:   -- wellness visit with fasting labs in Apri 2019.       Memorize Psalm 23. Recite as many times as you can if you are not able to fall asleep. Avoid reading or turning on lights.     Take Melatonin and Remeron around 10 PM, and go to bed at 10:30 PM.

## 2018-12-18 NOTE — PROGRESS NOTES
SUBJECTIVE:   Nancy Benz is a 89 year old female who presents to clinic today for the following health issues:      Recheck asthma, Improved.     She had a bout of asthma exacerbation. Has finished prednisone burst. She is feeling much better. She is not using the neb anymore, only inhaler as needed.     Continues to struggle with chronic insomnia. She has tried various medications in the past. She had some results with Doxepin for a while, then get used to it. She is now on Remeron 7.5 mg. She doesn't think it helps. They usually go down to sleep around 10:30 PM.  has no trouble falling asleep but when he gets up to go to the bathroom, often sees her reading with lights on and then he has trouble falling back to sleep. When he gets up in the morning at 7 AM, she is still sleeping, sometimes to 11 AM or noon.     ROS:  Constitutional, HEENT, cardiovascular, pulmonary, gi and gu systems are negative, except as otherwise noted.         Current Outpatient Medications on File Prior to Visit:  bimatoprost (LUMIGAN) 0.01 % SOLN Place 1 drop into both eyes At Bedtime   brimonidine-timolol (COMBIGAN) 0.2-0.5 % ophthalmic solution Place 1 drop into both eyes 2 times daily   Calcium Carb-Cholecalciferol (CALCIUM 600 + D) 600-200 MG-UNIT TABS Take 1 tablet by mouth daily Vitamin Z=6713 iu   cycloSPORINE (RESTASIS) 0.05 % ophthalmic emulsion Place 1 drop into both eyes every 12 hours   FLUoxetine (PROZAC) 20 MG capsule Take 1 capsule (20 mg) by mouth daily   fluticasone (FLOVENT HFA) 220 MCG/ACT Inhaler Inhale 2 puffs into the lungs 2 times daily   gabapentin (NEURONTIN) 100 MG capsule Take 2 capsules (200 mg) by mouth daily (late afternoon)   hydrocortisone 2.5 % ointment Apply topically 2 times daily Apply twice per day up to 1 week, take week off .   ipratropium - albuterol 0.5 mg/2.5 mg/3 mL (DUONEB) 0.5-2.5 (3) MG/3ML Take 1 vial by nebulization every 6 hours as needed for shortness of breath / dyspnea or  wheezing   mirtazapine (REMERON) 7.5 MG tablet TAKE 1 TABLET AT BEDTIME   polyethylene glycol 0.4%- propylene glycol 0.3% (SYSTANE) 0.4-0.3 % SOLN ophthalmic solution Place 1 drop into both eyes 3 times daily as needed for dry eyes   pravastatin (PRAVACHOL) 40 MG tablet TAKE 1/2 TABLET EVERY DAY   senna-docusate (SENOKOT-S;PERICOLACE) 8.6-50 MG per tablet Take 1 tablet by mouth daily   albuterol (PROAIR HFA/PROVENTIL HFA/VENTOLIN HFA) 108 (90 BASE) MCG/ACT Inhaler Inhale 2 puffs into the lungs every 4 hours as needed for shortness of breath / dyspnea or wheezing   albuterol (PROVENTIL) (2.5 MG/3ML) 0.083% neb solution Take 1 vial (2.5 mg) by nebulization every 6 hours as needed for shortness of breath / dyspnea or wheezing   Respiratory Therapy Supplies ROBERTO Optichamber or equivalent to use with spacer.     No current facility-administered medications on file prior to visit.        Patient Active Problem List   Diagnosis     Glaucoma     Keratoconjunctivitis sicca, not specified as Sjogren's, left     Cataract     Pseudophakia of both eyes     Presbyopia     Monoclonal gammopathy     Nonrheumatic aortic valve insufficiency     Dizziness     History of vertebral fracture     Nocturnal oxygen desaturation     Urgency incontinence     Hyperlipidemia LDL goal <130     Depression with anxiety     Restless leg syndrome     Mild persistent asthma without complication     ACP (advance care planning)     Chronic constipation     Prediabetes     Simple chronic bronchitis (H)     Senile osteoporosis     Essential hypertension     Past Surgical History:   Procedure Laterality Date     CATARACT IOL, RT/LT       PHACOEMULSIFICATION CLEAR CORNEA WITH STANDARD INTRAOCULAR LENS IMPLANT Left 7/10/07     PHACOEMULSIFICATION WITH INTRAOCULAR LENS IMPLANT, TRABECULECTOMY, COMBINED Right 4/30/01     REPAIR PTOSIS Bilateral 9/12/2016    Procedure: REPAIR PTOSIS;  Surgeon: Deyvi Lei MD;  Location: [facility] OR     REPAIR PTOSIS BROW Left  9/12/2016    Procedure: REPAIR PTOSIS BROW;  Surgeon: Deyvi Lei MD;  Location:  OR       Social History     Tobacco Use     Smoking status: Never Smoker     Smokeless tobacco: Never Used   Substance Use Topics     Alcohol use: Yes     Family History   Problem Relation Age of Onset     Glaucoma Son      Glaucoma Daughter              Problem list, Medication list, Allergies, and Medical/Social/Surgical histories reviewed in [facility] and updated as appropriate.    OBJECTIVE:                                                    /72   Pulse 63   Temp 96.8  F (36  C) (Temporal)   Wt 57.6 kg (127 lb)   SpO2 92%   BMI 24.80 kg/m      GENERAL: 89 year old female, alert and no distress        Diagnostic test results:        ASSESSMENT/PLAN:                                                      89 year old female with the following diagnoses and treatment plan:      ICD-10-CM    1. Chronic insomnia F51.04        -- mostly we discussed about options for insomnia. Reviewed the meds she has failed. Options are limited. We ended on having her take Melatonin and Remeron around 10 PM and go to bed at 10:30 PM consistently. If she is not able to fall asleep, she will meditate on scriptures Psalm 23, her 's favorite. Avoid reading or turning on lights or engaging in interesting activities.   -- return in April for wellness visit with fasting labs.     Will call or return to clinic if worsening or symptoms not improving as discussed.  See Patient Instructions.    A total of 25 minutes was spent face to face with this patient. More than 50% of the time was spent on education for the above problems and management plans.  Candy Trujillo MD-PhD  [facility]    (Note: Chart documentation was done in part with Dragon Voice Recognition software. Although reviewed after completion, some word and grammatical errors may remain.)

## 2019-01-04 ENCOUNTER — MYC MEDICAL ADVICE (OUTPATIENT)
Dept: PEDIATRICS | Facility: CLINIC | Age: 84
End: 2019-01-04

## 2019-01-04 NOTE — TELEPHONE ENCOUNTER
Routing to covering provider to please advise on pain control over the weekend for spontaneous compression fracture of Vertebrae     Almaz Mclean RN, [facility]

## 2019-01-06 ENCOUNTER — MYC MEDICAL ADVICE (OUTPATIENT)
Dept: PEDIATRICS | Facility: CLINIC | Age: 84
End: 2019-01-06

## 2019-01-06 DIAGNOSIS — S22.000A CLOSED COMPRESSION FRACTURE OF THORACIC VERTEBRA, INITIAL ENCOUNTER (H): Primary | ICD-10-CM

## 2019-01-06 DIAGNOSIS — R11.0 NAUSEA: ICD-10-CM

## 2019-01-06 DIAGNOSIS — E87.1 HYPONATREMIA: ICD-10-CM

## 2019-01-07 ENCOUNTER — MYC MEDICAL ADVICE (OUTPATIENT)
Dept: PEDIATRICS | Facility: CLINIC | Age: 84
End: 2019-01-07

## 2019-01-07 NOTE — TELEPHONE ENCOUNTER
Patient's daughter, Kady states she is ok with waiting until Dr. Trujillo's return this week to discuss pain control with fractures.    Almaz Mclean RN, [facility]

## 2019-01-07 NOTE — TELEPHONE ENCOUNTER
See previous [employer] message 1/6/19.    Routed to Teresa De Anda NP, APRN CNP who responded to previous message.  Levi GUERRERO, CMA

## 2019-01-08 RX ORDER — OXYCODONE HYDROCHLORIDE 5 MG/1
2.5-5 TABLET ORAL 2 TIMES DAILY PRN
Qty: 15 TABLET | Refills: 0 | Status: SHIPPED | OUTPATIENT
Start: 2019-01-08 | End: 2019-02-13

## 2019-01-08 RX ORDER — ONDANSETRON 4 MG/1
4 TABLET, ORALLY DISINTEGRATING ORAL EVERY 8 HOURS PRN
Qty: 30 TABLET | Refills: 1 | Status: SHIPPED | OUTPATIENT
Start: 2019-01-08 | End: 2019-05-07

## 2019-01-08 NOTE — TELEPHONE ENCOUNTER
Routing [employer] message to Dr. Trujillo  to please review when able.      Almaz Mclean RN, [facility]

## 2019-01-08 NOTE — TELEPHONE ENCOUNTER
Oyxodone: Please inform patient, refill/prescription approved and when prescription hard copy is ready to be picked up at .

## 2019-01-08 NOTE — TELEPHONE ENCOUNTER
Rx for oxyCODONE (ROXICODONE) 5 MG tablet placed at check-in C desk. Sent [employer] inchart to inform Justo GUERRERO, CMA

## 2019-01-10 ENCOUNTER — PATIENT OUTREACH (OUTPATIENT)
Dept: CARE COORDINATION | Facility: CLINIC | Age: 84
End: 2019-01-10

## 2019-01-10 ENCOUNTER — MEDICAL CORRESPONDENCE (OUTPATIENT)
Dept: HEALTH INFORMATION MANAGEMENT | Facility: CLINIC | Age: 84
End: 2019-01-10

## 2019-01-10 ENCOUNTER — TELEPHONE (OUTPATIENT)
Dept: PEDIATRICS | Facility: CLINIC | Age: 84
End: 2019-01-10

## 2019-01-10 DIAGNOSIS — E87.1 HYPONATREMIA: ICD-10-CM

## 2019-01-10 LAB
ANION GAP SERPL CALCULATED.3IONS-SCNC: 6 MMOL/L (ref 3–14)
BUN SERPL-MCNC: 14 MG/DL (ref 7–30)
CALCIUM SERPL-MCNC: 8.6 MG/DL (ref 8.5–10.1)
CHLORIDE SERPL-SCNC: 86 MMOL/L (ref 94–109)
CO2 SERPL-SCNC: 33 MMOL/L (ref 20–32)
CREAT SERPL-MCNC: 0.68 MG/DL (ref 0.52–1.04)
GFR SERPL CREATININE-BSD FRML MDRD: 74 ML/MIN/{1.73_M2}
GLUCOSE SERPL-MCNC: 119 MG/DL (ref 70–99)
POTASSIUM SERPL-SCNC: 4.8 MMOL/L (ref 3.4–5.3)
SODIUM SERPL-SCNC: 125 MMOL/L (ref 133–144)

## 2019-01-10 PROCEDURE — 36415 COLL VENOUS BLD VENIPUNCTURE: CPT | Performed by: INTERNAL MEDICINE

## 2019-01-10 PROCEDURE — 80048 BASIC METABOLIC PNL TOTAL CA: CPT | Performed by: INTERNAL MEDICINE

## 2019-01-10 ASSESSMENT — ACTIVITIES OF DAILY LIVING (ADL): DEPENDENT_IADLS:: CLEANING;COOKING;LAUNDRY;SHOPPING;MEAL PREPARATION;MEDICATION MANAGEMENT

## 2019-01-10 NOTE — PROGRESS NOTES
"Clinic Care Coordination Contact - Social Work - Initial - 1-10-19  Care Team Conversations    SW received message from pt's dtr, Kady Bellamy, [phone], and SW returned call to her today.  Kady is in [place], [place], with her  for the winter.  Pt has a sister here, Melisa Flores, though she is limited currently in how much assistance she can provide to pt.  Kady reports that pt was recently diagnosed with a compression fracture of the spine between her shoulders and is experiencing much pain as the result of it.  Pt is having difficulty getting comfortable and has been sleeping on the couch with a commode nearby.  Pt resides with her 96 year old spouse, who is providing assistance to her.  Pt was evaluated in the Emergency Room on 1-3-19, was offered the option of staying overnight in Observation Care and pt refused to stay.  Pt also refuses to consider going to a skilled nursing facility.  Pt/spouse have had some negative experiences in the past with rehab facilities and don't want to consider them.  Pt has grandkids locally, one being a nurse, who are assisting pt/spouse.  Pt does have [facility] RN that visits biweekly for medication management and pt pays for that privately.  SW also discussed the availability of extended hours of [facility] to assist in caring for pt at home though dtr doesn't feel that pt doesn't need much assistance and doesn't want to hire help that is not needed.  Dtr also inquired regarding hospice and discussed that with Dr Trujillo and pt is not hospice appropriate at this time.  Dtr indicates that family provides a lot of support to spouse so she does not think he needs caregiver resources at this time.  Per Kady, transporting pt at this time is impossible due to pain.  FRANCISCA discussed with dtr that pt is own decision maker so cannot \"force\" pt to go to a nursing home.  SW provided active listening, affirmations, and support to pt's dtr during contact.  From Kady's conversation " with Dr Trujillo, she understands that pt can be transported to ER via EMS if situation declines to that point that pt needs to be evaluated there.  Dtr has SW contact/availability information, should she need to talk with SW further regarding support/resources.               LEANNA Bonds     Social Work  [facility]  Office:  [phone]  E-Mail:  [email or web address]  1/10/2019 5:49 PM

## 2019-01-10 NOTE — TELEPHONE ENCOUNTER
Polina calling from [facility] ([facility]  Skin tear which Polina cleaned and dressed with a non-adherent dressing.  She is reporting the patient is in significant pain.  Patient has only 2-3 days of pain management left. Patient has also complained of blurred vision.  Requesting a undated medication list that is signed by the Provider.  Fax: [phone]  Thank you.

## 2019-01-11 ENCOUNTER — MYC MEDICAL ADVICE (OUTPATIENT)
Dept: PEDIATRICS | Facility: CLINIC | Age: 84
End: 2019-01-11

## 2019-01-11 ENCOUNTER — MEDICAL CORRESPONDENCE (OUTPATIENT)
Dept: HEALTH INFORMATION MANAGEMENT | Facility: CLINIC | Age: 84
End: 2019-01-11

## 2019-01-11 NOTE — TELEPHONE ENCOUNTER
Pain management is being addressed via [employer] message.     Please fax updated med list with 's signature as requested.     Oxana Edward RN

## 2019-01-14 ENCOUNTER — MYC MEDICAL ADVICE (OUTPATIENT)
Dept: PEDIATRICS | Facility: CLINIC | Age: 84
End: 2019-01-14

## 2019-01-14 NOTE — TELEPHONE ENCOUNTER
Routing [employer] message to Dr. Trujillo  to please review when able.      Informed patient's daughter, Kady, that Dr. Trujillo will be in tomorrow.      Almaz Mclean RN, [facility]

## 2019-01-15 ENCOUNTER — TRANSFERRED RECORDS (OUTPATIENT)
Dept: HEALTH INFORMATION MANAGEMENT | Facility: CLINIC | Age: 84
End: 2019-01-15

## 2019-01-15 NOTE — TELEPHONE ENCOUNTER
Health Care Directive and POLST printed and faxed to [employer] at fax #: [phone]. Received confirmation from [employer] that fax was sent successfully.    Sent patient's daughter MyChart reply stating information has been faxed as requested.  oRwena Dominguez, CMA

## 2019-01-17 ENCOUNTER — PATIENT OUTREACH (OUTPATIENT)
Dept: CARE COORDINATION | Facility: CLINIC | Age: 84
End: 2019-01-17

## 2019-01-17 NOTE — PROGRESS NOTES
Clinic Care Coordination Contact - Social Work - Follow-Up - 1-17-19    SW reviewed pt's medical record and noted that pt was hospitalized and then discharged to [facility] in [place].  Per review, it is anticipated that pt will be in TCU for 1-2 weeks.  SW will follow to determine whether pt has SW related needs upon discharge from [facility].    LEANNA Bonds     Social Work  [facility]  Office:  [phone]  E-Mail:  [email or web address]  1/17/2019 11:09 AM

## 2019-01-29 ENCOUNTER — PATIENT OUTREACH (OUTPATIENT)
Dept: CARE COORDINATION | Facility: CLINIC | Age: 84
End: 2019-01-29

## 2019-01-29 NOTE — PROGRESS NOTES
Clinic Care Coordination Contact - Social Work - Follow-Up - 1-29-19  [facility]/Voicemail       Clinical Data: SW/Care Coordinator Outreach to pt's dtr, Kady Bellamy, regarding whether pt is still in [facility] in [place].  Outreach attempted x1.  Left message on voicemail with call back information and requested return call.  Plan: SW/Care Coordinator will continue to follow, await return call from Kady, and assist as needed.     LEANNA Bonds     Social Work  [facility]  Office:  [phone]  E-Mail:  [email or web address]  1/29/2019 10:15 AM

## 2019-01-31 ENCOUNTER — MEDICAL CORRESPONDENCE (OUTPATIENT)
Dept: HEALTH INFORMATION MANAGEMENT | Facility: CLINIC | Age: 84
End: 2019-01-31

## 2019-02-05 ENCOUNTER — PATIENT OUTREACH (OUTPATIENT)
Dept: CARE COORDINATION | Facility: CLINIC | Age: 84
End: 2019-02-05

## 2019-02-05 ENCOUNTER — MYC MEDICAL ADVICE (OUTPATIENT)
Dept: PEDIATRICS | Facility: CLINIC | Age: 84
End: 2019-02-05

## 2019-02-05 DIAGNOSIS — Z53.9 DIAGNOSIS NOT YET DEFINED: Primary | ICD-10-CM

## 2019-02-05 PROCEDURE — G0179 MD RECERTIFICATION HHA PT: HCPCS | Performed by: INTERNAL MEDICINE

## 2019-02-05 NOTE — PROGRESS NOTES
Clinic Care Coordination Contact - Social Work - Follow-Up - 2-5-19  Care Team Conversations    SW received message from pt's dtr, Kady Bellamy, indicating that pt is doing well at [facility] and anticipates that pt will discharge back to [facility] with assisted living care on 2-7-19.  Kady is communicating with PCP regarding pain control medication for pt when she leaves [facility].  Kady denies any other  related needs related to pt's return to [facility] though will reach out to SW if SW related needs arise.  SW will no longer reach out to pt's dtr on a regular basis though remains available for future needs of pt.      Marco Antonio Kolb, LEANNA     Social Work  [facility]  Office:  [phone]  E-Mail:  [email or web address]  2/5/2019 8:56 AM

## 2019-02-08 ENCOUNTER — TELEPHONE (OUTPATIENT)
Dept: PEDIATRICS | Facility: CLINIC | Age: 84
End: 2019-02-08

## 2019-02-08 NOTE — TELEPHONE ENCOUNTER
Kady states she is worried about getting in due to weather. Scheduled 2/13 and Kady will check with her other sister Melisa and wants to keep the 2/21 appt just in case.   Vivian Vasquez RN

## 2019-02-08 NOTE — TELEPHONE ENCOUNTER
[facility] Call Center    Phone Message    May a detailed message be left on voicemail: yes    Reason for Call: Verbal orders for   Order(s): Home Care Orders: Other: skilled nursing today, twice next week, once a week for three weeks for pain management and medication management.  Physical therapy and Occupational therapy to evaluate and treat.  Nurse is concerned about patient feeling dizzy and states patient may be dehydrated.      Action Taken: Message routed to:  Primary Care p [phone]

## 2019-02-08 NOTE — TELEPHONE ENCOUNTER
Patient hadn't eaten or drank anything prior to 11:30am. Patient felt better after Propel and saltines.  Pain today was 3/10 and daughter Kady is worried about her running out of oxycodone.  She is scheduled on 2/21- will call to move up appt.  Vivian Vasquez RN

## 2019-02-11 ENCOUNTER — MEDICAL CORRESPONDENCE (OUTPATIENT)
Dept: HEALTH INFORMATION MANAGEMENT | Facility: CLINIC | Age: 84
End: 2019-02-11

## 2019-02-12 ENCOUNTER — MYC MEDICAL ADVICE (OUTPATIENT)
Dept: PEDIATRICS | Facility: CLINIC | Age: 84
End: 2019-02-12

## 2019-02-13 ENCOUNTER — OFFICE VISIT (OUTPATIENT)
Dept: PEDIATRICS | Facility: CLINIC | Age: 84
End: 2019-02-13
Payer: MEDICARE

## 2019-02-13 VITALS
BODY MASS INDEX: 24.61 KG/M2 | HEART RATE: 74 BPM | OXYGEN SATURATION: 94 % | TEMPERATURE: 96.2 F | DIASTOLIC BLOOD PRESSURE: 60 MMHG | WEIGHT: 126 LBS | SYSTOLIC BLOOD PRESSURE: 112 MMHG

## 2019-02-13 DIAGNOSIS — Z09 HOSPITAL DISCHARGE FOLLOW-UP: Primary | ICD-10-CM

## 2019-02-13 DIAGNOSIS — R11.0 NAUSEA: ICD-10-CM

## 2019-02-13 DIAGNOSIS — E87.1 HYPONATREMIA: ICD-10-CM

## 2019-02-13 DIAGNOSIS — H61.22 IMPACTED CERUMEN OF LEFT EAR: ICD-10-CM

## 2019-02-13 DIAGNOSIS — S22.000A CLOSED COMPRESSION FRACTURE OF THORACIC VERTEBRA, INITIAL ENCOUNTER (H): ICD-10-CM

## 2019-02-13 LAB
ANION GAP SERPL CALCULATED.3IONS-SCNC: 5 MMOL/L (ref 3–14)
BUN SERPL-MCNC: 14 MG/DL (ref 7–30)
CALCIUM SERPL-MCNC: 9 MG/DL (ref 8.5–10.1)
CHLORIDE SERPL-SCNC: 102 MMOL/L (ref 94–109)
CO2 SERPL-SCNC: 32 MMOL/L (ref 20–32)
CREAT SERPL-MCNC: 0.67 MG/DL (ref 0.52–1.04)
GFR SERPL CREATININE-BSD FRML MDRD: 74 ML/MIN/{1.73_M2}
GLUCOSE SERPL-MCNC: 91 MG/DL (ref 70–99)
POTASSIUM SERPL-SCNC: 4.3 MMOL/L (ref 3.4–5.3)
SODIUM SERPL-SCNC: 139 MMOL/L (ref 133–144)

## 2019-02-13 PROCEDURE — 99214 OFFICE O/P EST MOD 30 MIN: CPT | Mod: 25 | Performed by: INTERNAL MEDICINE

## 2019-02-13 PROCEDURE — 80048 BASIC METABOLIC PNL TOTAL CA: CPT | Performed by: INTERNAL MEDICINE

## 2019-02-13 PROCEDURE — 36415 COLL VENOUS BLD VENIPUNCTURE: CPT | Performed by: INTERNAL MEDICINE

## 2019-02-13 PROCEDURE — 69209 REMOVE IMPACTED EAR WAX UNI: CPT | Mod: LT | Performed by: INTERNAL MEDICINE

## 2019-02-13 RX ORDER — ONDANSETRON 4 MG/1
4 TABLET, ORALLY DISINTEGRATING ORAL EVERY 8 HOURS PRN
Qty: 30 TABLET | Refills: 1 | Status: SHIPPED | OUTPATIENT
Start: 2019-02-13 | End: 2019-02-13

## 2019-02-13 RX ORDER — ONDANSETRON 4 MG/1
4 TABLET, ORALLY DISINTEGRATING ORAL EVERY 8 HOURS PRN
Qty: 30 TABLET | Refills: 1 | Status: SHIPPED | OUTPATIENT
Start: 2019-02-13

## 2019-02-13 RX ORDER — OXYCODONE HYDROCHLORIDE 5 MG/1
2.5-5 TABLET ORAL 3 TIMES DAILY PRN
Qty: 63 TABLET | Refills: 0 | Status: SHIPPED | OUTPATIENT
Start: 2019-02-13

## 2019-02-13 NOTE — PATIENT INSTRUCTIONS
Medication(s) prescribed today:    Orders Placed This Encounter   Medications     oxyCODONE (ROXICODONE) 5 MG tablet     Sig: Take 0.5-1 tablets (2.5-5 mg) by mouth 3 times daily as needed for moderate to severe pain (for 3 weeks)     Dispense:  63 tablet     Refill:  0     ondansetron (ZOFRAN-ODT) 4 MG ODT tab     Sig: Take 1 tablet (4 mg) by mouth every 8 hours as needed for nausea     Dispense:  30 tablet     Refill:  1

## 2019-02-13 NOTE — PROGRESS NOTES
SUBJECTIVE:                                                      Patient presents for Hospital Followup Visit:    Hospital:  [facility]      Date of Admission: 1-11-19  Date of Discharge: 1-14-19  Reason(s) for Admission: Back pain T6 compression fracture, Acute hypoxia respiratory failure    She was given a back brace, and had rehab at [facility] until about a week ago. Returned to Assisted living.             Problems taking medications regularly:  None       Medication changes since discharge: None       Problems adhering to non-medication therapy:  None    Summary of hospitalization:  CareEverywhere information obtained and reviewed  Diagnostic Tests/Treatments reviewed.  Follow up needed: none  Other Healthcare Providers Involved in Patient s Care:         None     ==========  Post-discharge update:  Patient reported she is doing much better. She is taking Oxydone 5 mg 1 tablet bid and 1/2 tablet 1-2 times as needed for severe pain.     She and her family have decided to transfer care to the in house provider group [facility] as transportation has been a challenge for her to get to the clinic with her age, compression fracture and wearing a back brace. They have started the process but a lot of paper work to complete. It may take 3 weeks for her to get her first visit. She has oxycodone for 2 more days only. Would like to get a 3 week supply so that she won't have to come out again.     Has some nausea with taking oxycodone and would like some zofran.     No other concerns at this time.     Current Outpatient Medications   Medication Sig     albuterol (PROAIR HFA/PROVENTIL HFA/VENTOLIN HFA) 108 (90 BASE) MCG/ACT Inhaler Inhale 2 puffs into the lungs every 4 hours as needed for shortness of breath / dyspnea or wheezing     albuterol (PROVENTIL) (2.5 MG/3ML) 0.083% neb solution Take 1 vial (2.5 mg) by nebulization every 6 hours as needed for shortness of breath / dyspnea or wheezing     bimatoprost  (LUMIGAN) 0.01 % SOLN Place 1 drop into both eyes At Bedtime     brimonidine-timolol (COMBIGAN) 0.2-0.5 % ophthalmic solution Place 1 drop into both eyes 2 times daily     Calcium Carb-Cholecalciferol (CALCIUM 600 + D) 600-200 MG-UNIT TABS Take 1 tablet by mouth daily Vitamin N=9949 iu     cycloSPORINE (RESTASIS) 0.05 % ophthalmic emulsion Place 1 drop into both eyes every 12 hours     FLUoxetine (PROZAC) 20 MG capsule Take 1 capsule (20 mg) by mouth daily     fluticasone (FLOVENT HFA) 220 MCG/ACT Inhaler Inhale 2 puffs into the lungs 2 times daily     gabapentin (NEURONTIN) 100 MG capsule Take 2 capsules (200 mg) by mouth daily (late afternoon)     hydrocortisone 2.5 % ointment Apply topically 2 times daily Apply twice per day up to 1 week, take week off .     ipratropium - albuterol 0.5 mg/2.5 mg/3 mL (DUONEB) 0.5-2.5 (3) MG/3ML Take 1 vial by nebulization every 6 hours as needed for shortness of breath / dyspnea or wheezing     mirtazapine (REMERON) 7.5 MG tablet TAKE 1 TABLET AT BEDTIME     Multiple Vitamins-Minerals (PRESERVISION AREDS 2 PO) Take by mouth 2 times daily     ondansetron (ZOFRAN-ODT) 4 MG ODT tab Take 1 tablet (4 mg) by mouth every 8 hours as needed for nausea     oxyCODONE (ROXICODONE) 5 MG tablet Take 0.5-1 tablets (2.5-5 mg) by mouth 3 times daily as needed for moderate to severe pain (for 3 weeks)     polyethylene glycol 0.4%- propylene glycol 0.3% (SYSTANE) 0.4-0.3 % SOLN ophthalmic solution Place 1 drop into both eyes 3 times daily as needed for dry eyes     pravastatin (PRAVACHOL) 40 MG tablet TAKE 1/2 TABLET EVERY DAY     Respiratory Therapy Supplies ROBERTO Optichamber or equivalent to use with spacer.     senna-docusate (SENOKOT-S;PERICOLACE) 8.6-50 MG per tablet Take 1 tablet by mouth daily     No current facility-administered medications for this visit.         Patient Active Problem List   Diagnosis     Glaucoma     Keratoconjunctivitis sicca, not specified as Sjogren's, left      Cataract     Pseudophakia of both eyes     Presbyopia     Monoclonal gammopathy     Nonrheumatic aortic valve insufficiency     Dizziness     History of vertebral fracture     Nocturnal oxygen desaturation     Urgency incontinence     Hyperlipidemia LDL goal <130     Depression with anxiety     Restless leg syndrome     Mild persistent asthma without complication     ACP (advance care planning)     Chronic constipation     Prediabetes     Simple chronic bronchitis (H)     Senile osteoporosis     Essential hypertension     Past Surgical History:   Procedure Laterality Date     CATARACT IOL, RT/LT       PHACOEMULSIFICATION CLEAR CORNEA WITH STANDARD INTRAOCULAR LENS IMPLANT Left 7/10/07     PHACOEMULSIFICATION WITH INTRAOCULAR LENS IMPLANT, TRABECULECTOMY, COMBINED Right 4/30/01     REPAIR PTOSIS Bilateral 9/12/2016    Procedure: REPAIR PTOSIS;  Surgeon: Deyvi Lei MD;  Location: [facility] OR     REPAIR PTOSIS BROW Left 9/12/2016    Procedure: REPAIR PTOSIS BROW;  Surgeon: Deyvi Lei MD;  Location: [facility] OR       Social History     Tobacco Use     Smoking status: Never Smoker     Smokeless tobacco: Never Used   Substance Use Topics     Alcohol use: Yes     Family History   Problem Relation Age of Onset     Glaucoma Son      Glaucoma Daughter             Problem list, Medication list, Allergies, and Medical/Social/Surgical histories reviewed in [facility] and updated as appropriate.    OBJECTIVE:                                                    /60   Pulse 74   Temp 96.2  F (35.7  C) (Temporal)   Wt 57.2 kg (126 lb)   SpO2 94%   BMI 24.61 kg/m      GEN: healthy, alert and no distress  Wearing a body brace, walks with a walker.        Diagnostic test results:  No results found for this or any previous visit (from the past 24 hour(s)).       ASSESSMENT/PLAN:                                                      89 year old female with the following diagnoses and treatment plan:      ICD-10-CM    1. [facility]  discharge follow-up Z09    2. Closed compression fracture of thoracic vertebra, initial encounter (H) S22.000A oxyCODONE (ROXICODONE) 5 MG tablet   3. Nausea R11.0 ondansetron (ZOFRAN-ODT) 4 MG ODT tab       -- patient with compression fracture of thoracic spine, wearing brace and uses oxycodone for severe pain, manageable with current dosing. I gave her 3 weeks of oxycodone and zofran.       Post Discharge Medication Reconciliation: discharge medications reconciled and changed, per note/orders (see AVS).  Plan of care communicated with patient and daughter Liz    Will call or return to clinic if worsening or symptoms not improving as discussed.  See Patient Instructions.      Candy Trujillo MD-PhD  [facility] [place]    (Note: Chart documentation was done in part with Dragon Voice Recognition software. Although reviewed after completion, some word and grammatical errors may remain.)

## 2019-02-14 ENCOUNTER — TELEPHONE (OUTPATIENT)
Dept: PEDIATRICS | Facility: CLINIC | Age: 84
End: 2019-02-14

## 2019-02-14 DIAGNOSIS — F51.04 CHRONIC INSOMNIA: ICD-10-CM

## 2019-02-14 RX ORDER — DOXEPIN 3 MG/1
3 TABLET, FILM COATED ORAL AT BEDTIME
Qty: 30 TABLET | Refills: 0 | Status: SHIPPED | OUTPATIENT
Start: 2019-02-14

## 2019-02-14 NOTE — RESULT ENCOUNTER NOTE
Dear Magalie,   Your recent test result are within acceptable range or at baseline. Please continue with your current plan of care.       Please call or Mychart to our office if you have further questions.     Candy Trujillo MD-PhD

## 2019-02-14 NOTE — TELEPHONE ENCOUNTER
Daughter requested patient have Doxepin, but it was dc'd at TCU. She wasn't aware of any reasons why it was stopped.  Vivian Vasquez RN

## 2019-02-14 NOTE — TELEPHONE ENCOUNTER
Polina from [facility] requesting a call to discuss restarting Doxepin to aid in sleeping.  Please advise.  Thank you.

## 2019-02-14 NOTE — TELEPHONE ENCOUNTER
Called Polina, relayed message & she verbalized understanding.   JUSTIN Edwards CNP, their onsite caregiver, will be taking over all primary care.  Vivian Vasquez RN

## 2019-02-19 ENCOUNTER — MEDICAL CORRESPONDENCE (OUTPATIENT)
Dept: HEALTH INFORMATION MANAGEMENT | Facility: CLINIC | Age: 84
End: 2019-02-19

## 2019-02-22 ENCOUNTER — MEDICAL CORRESPONDENCE (OUTPATIENT)
Dept: HEALTH INFORMATION MANAGEMENT | Facility: CLINIC | Age: 84
End: 2019-02-22

## 2019-02-27 DIAGNOSIS — Z53.9 DIAGNOSIS NOT YET DEFINED: Primary | ICD-10-CM

## 2019-02-27 PROCEDURE — G0180 MD CERTIFICATION HHA PATIENT: HCPCS | Performed by: INTERNAL MEDICINE

## 2019-03-06 ENCOUNTER — MEDICAL CORRESPONDENCE (OUTPATIENT)
Dept: HEALTH INFORMATION MANAGEMENT | Facility: CLINIC | Age: 84
End: 2019-03-06

## 2019-03-14 ENCOUNTER — MEDICAL CORRESPONDENCE (OUTPATIENT)
Dept: HEALTH INFORMATION MANAGEMENT | Facility: CLINIC | Age: 84
End: 2019-03-14

## 2019-04-10 ENCOUNTER — OFFICE VISIT (OUTPATIENT)
Dept: ORTHOPEDICS | Facility: CLINIC | Age: 84
End: 2019-04-10
Payer: MEDICARE

## 2019-04-10 VITALS
WEIGHT: 126 LBS | HEART RATE: 74 BPM | BODY MASS INDEX: 24.61 KG/M2 | SYSTOLIC BLOOD PRESSURE: 100 MMHG | DIASTOLIC BLOOD PRESSURE: 59 MMHG

## 2019-04-10 DIAGNOSIS — M62.830 LUMBAR PARASPINAL MUSCLE SPASM: Primary | ICD-10-CM

## 2019-04-10 DIAGNOSIS — M51.369 DDD (DEGENERATIVE DISC DISEASE), LUMBAR: ICD-10-CM

## 2019-04-10 DIAGNOSIS — S22.000S THORACIC COMPRESSION FRACTURE, SEQUELA: ICD-10-CM

## 2019-04-10 PROCEDURE — 99213 OFFICE O/P EST LOW 20 MIN: CPT | Performed by: PREVENTIVE MEDICINE

## 2019-04-10 RX ORDER — TIZANIDINE 2 MG/1
2-6 TABLET ORAL
Qty: 60 TABLET | Refills: 1 | Status: SHIPPED | OUTPATIENT
Start: 2019-04-10

## 2019-04-10 NOTE — PROGRESS NOTES
HISTORY OF PRESENT ILLNESS  Ms. Benz is a pleasant 89 year old year old female who presents to clinic today for followup for lumbar ddd, radicular pain  Last had lumbar injection early December, got improvement in low back pain  Had subsequent diagnosis of T6 compression fracture, in Jan. For which she was prescribed a back brace for the past 12 weeks. Overall she feels better, less pain in her thoracic spine. Her low back is starting to bother her more at nighttime again    MEDICAL HISTORY  Patient Active Problem List   Diagnosis     Glaucoma     Keratoconjunctivitis sicca, not specified as Sjogren's, left     Cataract     Pseudophakia of both eyes     Presbyopia     Monoclonal gammopathy     Nonrheumatic aortic valve insufficiency     Dizziness     History of vertebral fracture     Nocturnal oxygen desaturation     Urgency incontinence     Hyperlipidemia LDL goal <130     Depression with anxiety     Restless leg syndrome     Mild persistent asthma without complication     ACP (advance care planning)     Chronic constipation     Prediabetes     Simple chronic bronchitis (H)     Senile osteoporosis     Essential hypertension       Current Outpatient Medications   Medication Sig Dispense Refill     albuterol (PROAIR HFA/PROVENTIL HFA/VENTOLIN HFA) 108 (90 BASE) MCG/ACT Inhaler Inhale 2 puffs into the lungs every 4 hours as needed for shortness of breath / dyspnea or wheezing 3 Inhaler 3     albuterol (PROVENTIL) (2.5 MG/3ML) 0.083% neb solution Take 1 vial (2.5 mg) by nebulization every 6 hours as needed for shortness of breath / dyspnea or wheezing 360 mL 3     bimatoprost (LUMIGAN) 0.01 % SOLN Place 1 drop into both eyes At Bedtime 3 Bottle 11     brimonidine-timolol (COMBIGAN) 0.2-0.5 % ophthalmic solution Place 1 drop into both eyes 2 times daily 10 mL 11     Calcium Carb-Cholecalciferol (CALCIUM 600 + D) 600-200 MG-UNIT TABS Take 1 tablet by mouth daily Vitamin Y=6351 iu       cycloSPORINE (RESTASIS) 0.05 %  ophthalmic emulsion Place 1 drop into both eyes every 12 hours 1 Box 11     doxepin (SILENOR) 3 MG tablet Take 1 tablet (3 mg) by mouth At Bedtime 30 tablet 0     FLUoxetine (PROZAC) 20 MG capsule Take 1 capsule (20 mg) by mouth daily 90 capsule 3     fluticasone (FLOVENT HFA) 220 MCG/ACT Inhaler Inhale 2 puffs into the lungs 2 times daily 36 g 3     gabapentin (NEURONTIN) 100 MG capsule Take 2 capsules (200 mg) by mouth daily (late afternoon) 180 capsule 3     hydrocortisone 2.5 % ointment Apply topically 2 times daily Apply twice per day up to 1 week, take week off . 30 g 0     ipratropium - albuterol 0.5 mg/2.5 mg/3 mL (DUONEB) 0.5-2.5 (3) MG/3ML Take 1 vial by nebulization every 6 hours as needed for shortness of breath / dyspnea or wheezing       mirtazapine (REMERON) 7.5 MG tablet TAKE 1 TABLET AT BEDTIME 30 tablet 1     Multiple Vitamins-Minerals (PRESERVISION AREDS 2 PO) Take by mouth 2 times daily       ondansetron (ZOFRAN-ODT) 4 MG ODT tab Take 1 tablet (4 mg) by mouth every 8 hours as needed for nausea 30 tablet 1     oxyCODONE (ROXICODONE) 5 MG tablet Take 0.5-1 tablets (2.5-5 mg) by mouth 3 times daily as needed for moderate to severe pain (for 3 weeks) 63 tablet 0     polyethylene glycol 0.4%- propylene glycol 0.3% (SYSTANE) 0.4-0.3 % SOLN ophthalmic solution Place 1 drop into both eyes 3 times daily as needed for dry eyes       pravastatin (PRAVACHOL) 40 MG tablet TAKE 1/2 TABLET EVERY DAY 45 tablet 3     Respiratory Therapy Supplies [facility] or equivalent to use with spacer. 1 each 0     senna-docusate (SENOKOT-S;PERICOLACE) 8.6-50 MG per tablet Take 1 tablet by mouth daily       tiZANidine (ZANAFLEX) 2 MG tablet Take 1-3 tablets (2-6 mg) by mouth nightly as needed for muscle spasms 60 tablet 1       Allergies   Allergen Reactions     Hydrocodone-Acetaminophen Other (See Comments)     Nausea, dizzy, felt awful     Pilocarpine      Timolol Difficulty breathing     Makes asthma worse    Oral  tabs.. ophth sol does not cause a problem       Family History   Problem Relation Age of Onset     Glaucoma Son      Glaucoma Daughter        Additional medical/Social/Surgical histories reviewed in [facility] and updated as appropriate.     REVIEW OF SYSTEMS (4/10/2019)  10 point ROS of systems including Constitutional, Eyes, Respiratory, Cardiovascular, Gastroenterology, Genitourinary, Integumentary, Musculoskeletal, Psychiatric were all negative except for pertinent positives noted in my HPI.     PHYSICAL EXAM  Vitals:    04/10/19 1050   BP: 100/59   Pulse: 74   Weight: 57.2 kg (126 lb)     Vital Signs: /59   Pulse 74   Wt 57.2 kg (126 lb)   BMI 24.61 kg/m   Patient declined being weighed. Body mass index is 24.61 kg/m .    General  - normal appearance, in no obvious distress  CV  - normal peripheral perfusion  Pulm  - normal respiratory pattern, non-labored  Musculoskeletal - thoracic lumbar spine  - stance: normal gait without limp, no obvious leg length discrepancy, normal heel and toe walk  - inspection: normal bone and joint alignment, no obvious scoliosis  - palpation: no paravertebral or bony tenderness, except some over level of T6 but not severe  - ROM: flexion exacerbates pain, normal extension, sidebending, rotation  - strength: lower extremities 5/5 in all planes  - special tests:  (+) straight leg raise  (+) slump test  Neuro  - patellar and Achilles DTRs 2+ bilaterally, no lower extremity sensory deficit throughout L5 distribution, grossly normal coordination, normal muscle tone  Skin  - no ecchymosis, erythema, warmth, or induration, no obvious rash  Psych  - interactive, appropriate, normal mood and affect    ASSESSMENT & PLAN  89 yo female with lumbar ddd, spinal stenosis, worse again, history of T6 compression fracture, improved  Will wait to repeat imaging for thoracic spine as it has overall improved with use of brace and will consider imaging if condition worsens  Reviewed her previous  lumbar injections, ordered another  Lidocaine patches PRN  tizanadine nightly PRN  Use tylenol as well  F/u as needed    Norris Fletcher MD, CAQSM

## 2019-04-10 NOTE — LETTER
4/10/2019         RE: Nancy Benz  [address]  [address]  [place] [address]        Dear Colleague,    Thank you for referring your patient, Nancy Benz, to the [facility]. Please see a copy of my visit note below.    HISTORY OF PRESENT ILLNESS  Ms. Benz is a pleasant 89 year old year old female who presents to clinic today for followup for lumbar ddd, radicular pain  Last had lumbar injection early December, got improvement in low back pain  Had subsequent diagnosis of T6 compression fracture, in Jan. For which she was prescribed a back brace for the past 12 weeks. Overall she feels better, less pain in her thoracic spine. Her low back is starting to bother her more at nighttime again    MEDICAL HISTORY  Patient Active Problem List   Diagnosis     Glaucoma     Keratoconjunctivitis sicca, not specified as Sjogren's, left     Cataract     Pseudophakia of both eyes     Presbyopia     Monoclonal gammopathy     Nonrheumatic aortic valve insufficiency     Dizziness     History of vertebral fracture     Nocturnal oxygen desaturation     Urgency incontinence     Hyperlipidemia LDL goal <130     Depression with anxiety     Restless leg syndrome     Mild persistent asthma without complication     ACP (advance care planning)     Chronic constipation     Prediabetes     Simple chronic bronchitis (H)     Senile osteoporosis     Essential hypertension       Current Outpatient Medications   Medication Sig Dispense Refill     albuterol (PROAIR HFA/PROVENTIL HFA/VENTOLIN HFA) 108 (90 BASE) MCG/ACT Inhaler Inhale 2 puffs into the lungs every 4 hours as needed for shortness of breath / dyspnea or wheezing 3 Inhaler 3     albuterol (PROVENTIL) (2.5 MG/3ML) 0.083% neb solution Take 1 vial (2.5 mg) by nebulization every 6 hours as needed for shortness of breath / dyspnea or wheezing 360 mL 3     bimatoprost (LUMIGAN) 0.01 % SOLN Place 1 drop into both eyes At Bedtime 3 Bottle 11      brimonidine-timolol (COMBIGAN) 0.2-0.5 % ophthalmic solution Place 1 drop into both eyes 2 times daily 10 mL 11     Calcium Carb-Cholecalciferol (CALCIUM 600 + D) 600-200 MG-UNIT TABS Take 1 tablet by mouth daily Vitamin J=0408 iu       cycloSPORINE (RESTASIS) 0.05 % ophthalmic emulsion Place 1 drop into both eyes every 12 hours 1 Box 11     doxepin (SILENOR) 3 MG tablet Take 1 tablet (3 mg) by mouth At Bedtime 30 tablet 0     FLUoxetine (PROZAC) 20 MG capsule Take 1 capsule (20 mg) by mouth daily 90 capsule 3     fluticasone (FLOVENT HFA) 220 MCG/ACT Inhaler Inhale 2 puffs into the lungs 2 times daily 36 g 3     gabapentin (NEURONTIN) 100 MG capsule Take 2 capsules (200 mg) by mouth daily (late afternoon) 180 capsule 3     hydrocortisone 2.5 % ointment Apply topically 2 times daily Apply twice per day up to 1 week, take week off . 30 g 0     ipratropium - albuterol 0.5 mg/2.5 mg/3 mL (DUONEB) 0.5-2.5 (3) MG/3ML Take 1 vial by nebulization every 6 hours as needed for shortness of breath / dyspnea or wheezing       mirtazapine (REMERON) 7.5 MG tablet TAKE 1 TABLET AT BEDTIME 30 tablet 1     Multiple Vitamins-Minerals (PRESERVISION AREDS 2 PO) Take by mouth 2 times daily       ondansetron (ZOFRAN-ODT) 4 MG ODT tab Take 1 tablet (4 mg) by mouth every 8 hours as needed for nausea 30 tablet 1     oxyCODONE (ROXICODONE) 5 MG tablet Take 0.5-1 tablets (2.5-5 mg) by mouth 3 times daily as needed for moderate to severe pain (for 3 weeks) 63 tablet 0     polyethylene glycol 0.4%- propylene glycol 0.3% (SYSTANE) 0.4-0.3 % SOLN ophthalmic solution Place 1 drop into both eyes 3 times daily as needed for dry eyes       pravastatin (PRAVACHOL) 40 MG tablet TAKE 1/2 TABLET EVERY DAY 45 tablet 3     Respiratory Therapy Supplies ROBERTO Optichamber or equivalent to use with spacer. 1 each 0     senna-docusate (SENOKOT-S;PERICOLACE) 8.6-50 MG per tablet Take 1 tablet by mouth daily       tiZANidine (ZANAFLEX) 2 MG tablet Take 1-3 tablets  (2-6 mg) by mouth nightly as needed for muscle spasms 60 tablet 1       Allergies   Allergen Reactions     Hydrocodone-Acetaminophen Other (See Comments)     Nausea, dizzy, felt awful     Pilocarpine      Timolol Difficulty breathing     Makes asthma worse    Oral tabs.. ophth sol does not cause a problem       Family History   Problem Relation Age of Onset     Glaucoma Son      Glaucoma Daughter        Additional medical/Social/Surgical histories reviewed in [facility] and updated as appropriate.     REVIEW OF SYSTEMS (4/10/2019)  10 point ROS of systems including Constitutional, Eyes, Respiratory, Cardiovascular, Gastroenterology, Genitourinary, Integumentary, Musculoskeletal, Psychiatric were all negative except for pertinent positives noted in my HPI.     PHYSICAL EXAM  Vitals:    04/10/19 1050   BP: 100/59   Pulse: 74   Weight: 57.2 kg (126 lb)     Vital Signs: /59   Pulse 74   Wt 57.2 kg (126 lb)   BMI 24.61 kg/m    Patient declined being weighed. Body mass index is 24.61 kg/m .    General  - normal appearance, in no obvious distress  CV  - normal peripheral perfusion  Pulm  - normal respiratory pattern, non-labored  Musculoskeletal - thoracic lumbar spine  - stance: normal gait without limp, no obvious leg length discrepancy, normal heel and toe walk  - inspection: normal bone and joint alignment, no obvious scoliosis  - palpation: no paravertebral or bony tenderness, except some over level of T6 but not severe  - ROM: flexion exacerbates pain, normal extension, sidebending, rotation  - strength: lower extremities 5/5 in all planes  - special tests:  (+) straight leg raise  (+) slump test  Neuro  - patellar and Achilles DTRs 2+ bilaterally, no lower extremity sensory deficit throughout L5 distribution, grossly normal coordination, normal muscle tone  Skin  - no ecchymosis, erythema, warmth, or induration, no obvious rash  Psych  - interactive, appropriate, normal mood and affect    ASSESSMENT & PLAN  89 yo  female with lumbar ddd, spinal stenosis, worse again, history of T6 compression fracture, improved  Will wait to repeat imaging for thoracic spine as it has overall improved with use of brace and will consider imaging if condition worsens  Reviewed her previous lumbar injections, ordered another  Lidocaine patches PRN  tizanadine nightly PRN  Use tylenol as well  F/u as needed    Norris Fletcher MD, [facility] [place]    Again, thank you for allowing me to participate in the care of your patient.        Sincerely,        Norris Fletcher MD

## 2019-04-10 NOTE — PATIENT INSTRUCTIONS
Thanks for coming today.  Ortho[facility] Medicine Clinic  [address] [place] [address]    To schedule future appointments in Ortho Clinic, you may call [phone].    To schedule ordered imaging by your provider:   Call [facility] [phone]    To schedule an injection ordered by your provider:  Call [facility] Injection scheduling line: [phone]  [employer] available online at:  [email or web address]    Please call if any further questions or concerns ([phone]).  Clinic hours 8 am to 5 pm.    Return to clinic (call) if symptoms worsen or fail to improve.

## 2019-04-11 DIAGNOSIS — J41.0 SIMPLE CHRONIC BRONCHITIS (H): ICD-10-CM

## 2019-04-12 RX ORDER — FLUTICASONE PROPIONATE 220 UG/1
AEROSOL, METERED RESPIRATORY (INHALATION)
Qty: 36 G | Refills: 0 | Status: SHIPPED | OUTPATIENT
Start: 2019-04-12 | End: 2019-07-03

## 2019-05-07 ENCOUNTER — OFFICE VISIT (OUTPATIENT)
Dept: OPHTHALMOLOGY | Facility: CLINIC | Age: 84
End: 2019-05-07
Payer: MEDICARE

## 2019-05-07 DIAGNOSIS — H04.121: ICD-10-CM

## 2019-05-07 DIAGNOSIS — H02.889 MEIBOMIAN GLAND DISEASE, UNSPECIFIED LATERALITY: ICD-10-CM

## 2019-05-07 DIAGNOSIS — H40.1113 PRIMARY OPEN ANGLE GLAUCOMA (POAG) OF RIGHT EYE, SEVERE STAGE: ICD-10-CM

## 2019-05-07 DIAGNOSIS — L71.9 BLEPHARITIS OF BOTH EYES WITH ROSACEA: Primary | ICD-10-CM

## 2019-05-07 DIAGNOSIS — H01.003 BLEPHARITIS OF BOTH EYES WITH ROSACEA: Primary | ICD-10-CM

## 2019-05-07 DIAGNOSIS — H01.006 BLEPHARITIS OF BOTH EYES WITH ROSACEA: Primary | ICD-10-CM

## 2019-05-07 DIAGNOSIS — H02.883: ICD-10-CM

## 2019-05-07 PROCEDURE — 92014 COMPRE OPH EXAM EST PT 1/>: CPT | Performed by: OPHTHALMOLOGY

## 2019-05-07 RX ORDER — DOXYCYCLINE HYCLATE 50 MG/1
50 CAPSULE ORAL 2 TIMES DAILY
Qty: 120 CAPSULE | Refills: 3 | Status: SHIPPED | OUTPATIENT
Start: 2019-05-07

## 2019-05-07 ASSESSMENT — VISUAL ACUITY
CORRECTION_TYPE: GLASSES
OS_SC+: -1
OS_CC: 20/100
OS_SC: 20/125
OD_SC: CF@3
OD_CC: 20/400
METHOD: SNELLEN - LINEAR

## 2019-05-07 ASSESSMENT — CONF VISUAL FIELD
OD_SUPERIOR_NASAL_RESTRICTION: 3
OS_NORMAL: 1

## 2019-05-07 ASSESSMENT — REFRACTION_WEARINGRX
OD_SPHERE: BALANCE
OS_ADD: +3.00
OS_CYLINDER: +2.00
OS_AXIS: 011
OS_SPHERE: -1.50

## 2019-05-07 ASSESSMENT — TONOMETRY
IOP_METHOD: TONOPEN
OD_IOP_MMHG: 17
OS_IOP_MMHG: 15

## 2019-05-07 ASSESSMENT — CUP TO DISC RATIO
OS_RATIO: 0.6
OD_RATIO: 0.7

## 2019-05-07 ASSESSMENT — EXTERNAL EXAM - LEFT EYE: OS_EXAM: S/P PTOSIS REPAIR

## 2019-05-07 ASSESSMENT — REFRACTION_MANIFEST
OS_CYLINDER: +2.00
METHOD_AUTOREFRACTION: 1
OS_SPHERE: -1.50
OS_ADD: +3.00
OD_SPHERE: BALANCE
OS_AXIS: 010

## 2019-05-07 ASSESSMENT — EXTERNAL EXAM - RIGHT EYE: OD_EXAM: S/P PTOSIS REPAIR

## 2019-05-07 NOTE — PROGRESS NOTES
Assessment & Plan   Nancy Benz is a 89 year old female who presents with:   Review of systems for the eyes was negative other than the pertinent positives and negatives noted in the HPI.    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    Primary open angle glaucoma (POAG) of right eye, severe stage  - s/p TRAB OD      Return in 12 months for annual exam.    Schedule Lipiscan and possible Lipiflow (cost) will call daughter Kady Bellamy to schedule [phone])      Attending Physician Attestation:  Complete documentation of historical and exam elements from today's encounter can be found in the full encounter summary report (not reduplicated in this progress note).  I personally obtained the chief complaint(s) and history of present illness.  I confirmed and edited as necessary the review of systems, past medical/surgical history, family history, social history, and examination findings as documented by others; and I examined the patient myself.  I personally reviewed the relevant tests, images, and reports as documented above.  I formulated and edited as necessary the assessment and plan and discussed the findings and management plan with the patient and family. - Lee Reyes MD

## 2019-05-07 NOTE — NURSING NOTE
Patient presents with:  Blurred Vision Evaluation: Pt says her vision is blurry - right eye is worse.  Dry Eye(s) Both Eyes: Pt using Systane throughout the day - it will help her vision for a brief amount of time.Using 6 + x day.Lumigan at bedtime - used last nightRestasis - bid - Used this morning  Glaucoma Evaluation: Pt uses Combigan bid - last used this morning.Lumigan at bedtime each eye .      Referring Provider:  No referring provider defined for this encounter.        Haven Cuevas COT

## 2019-05-09 ENCOUNTER — TELEPHONE (OUTPATIENT)
Dept: PEDIATRICS | Facility: CLINIC | Age: 84
End: 2019-05-09

## 2019-05-09 NOTE — TELEPHONE ENCOUNTER
Called Ox[facility], relayed message & she verbalized understanding. Nancy Edwards CNP is listed as new caregiver per 2/14 encounter.   Vivian Vasquez RN

## 2019-05-09 NOTE — TELEPHONE ENCOUNTER
[facility]    Phone Message    May a detailed message be left on voicemail: yes    Reason for Call: Other: Needs signed lab order from 3/28/19  faxed to [phone]. Needs to be faxed today. Please call aron once the order for Urine culture UA has been faxed     Action Taken: Message routed to:  Primary Care p [phone]

## 2019-05-09 NOTE — TELEPHONE ENCOUNTER
I did not order UA UC for her. Patient has transfer primary care to [facility] who goes to her assisted living at the end of February.  Please have them contact her new PCP.

## 2019-05-15 DIAGNOSIS — H40.1132 PRIMARY OPEN ANGLE GLAUCOMA OF BOTH EYES, MODERATE STAGE: ICD-10-CM

## 2019-05-15 RX ORDER — BRIMONIDINE TARTRATE AND TIMOLOL MALEATE 2; 5 MG/ML; MG/ML
1 SOLUTION OPHTHALMIC 2 TIMES DAILY
Qty: 10 ML | Refills: 3 | Status: SHIPPED | OUTPATIENT
Start: 2019-05-15 | End: 2019-06-14

## 2019-06-03 DIAGNOSIS — H40.1130 PRIMARY OPEN ANGLE GLAUCOMA OF BOTH EYES, UNSPECIFIED GLAUCOMA STAGE: ICD-10-CM

## 2019-06-03 NOTE — TELEPHONE ENCOUNTER
Patient's daughter called requesting rx for patient. Patient needs rx for Lumigan sent to [facility] mail order - requires a 3 month supply.    Telephone refill request received from patient's daughter.  Medication Requested: Lumigan  Directions: 1 drop at bedtime  Quantity: 3 months supply  Last Office Visit: 05/07/2019  Next Appointment Scheduled for:

## 2019-06-14 DIAGNOSIS — H40.1132 PRIMARY OPEN ANGLE GLAUCOMA OF BOTH EYES, MODERATE STAGE: ICD-10-CM

## 2019-06-14 RX ORDER — BRIMONIDINE TARTRATE AND TIMOLOL MALEATE 2; 5 MG/ML; MG/ML
1 SOLUTION OPHTHALMIC 2 TIMES DAILY
Qty: 10 ML | Refills: 4 | Status: SHIPPED | OUTPATIENT
Start: 2019-06-14

## 2019-07-03 DIAGNOSIS — J41.0 SIMPLE CHRONIC BRONCHITIS (H): ICD-10-CM

## 2019-07-03 RX ORDER — FLUTICASONE PROPIONATE 220 UG/1
AEROSOL, METERED RESPIRATORY (INHALATION)
Qty: 12 G | Refills: 0 | Status: SHIPPED | OUTPATIENT
Start: 2019-07-03

## 2019-07-03 NOTE — TELEPHONE ENCOUNTER
LOV- 2/13 says: return in April for wellness visit with fasting labs and ACT. Sent jeri and a letter.  Vivian Vasquez RN

## 2019-07-03 NOTE — LETTER
July 3, 2019      Nancy Benz  [address] [address] [place] [address] [place]  [place] [address]              Dear Nancy,    We recently received a refill request from your pharmacy requesting a refill of : Flovent.    A review of your chart indicates that your last office visit was on 2/13. You are due for a wellness visit with fasting labs. We have authorized a one time 30 day refill of your medication to allow time for you to schedule.     Please call the clinic at [phone], or log in to your [facility] account at [email or web address] to schedule your appointment within that time frame so there is no delay in next month's refill.    Thank you for taking an active role in your healthcare.    Sincerely,      SHREYAS Tavera MD    If you need assistance with your [facility] log in, please call [phone].

## 2019-09-26 DIAGNOSIS — G25.81 RESTLESS LEG SYNDROME: ICD-10-CM

## 2019-09-26 RX ORDER — GABAPENTIN 100 MG/1
200 CAPSULE ORAL DAILY
Qty: 180 CAPSULE | Refills: 3 | OUTPATIENT
Start: 2019-09-26

## 2019-09-26 NOTE — TELEPHONE ENCOUNTER
Please have pharmacy contact pt's assisted living facility. She is now under the care of the house MD.

## 2019-09-26 NOTE — TELEPHONE ENCOUNTER
Gabapentin      Last Written Prescription Date:  7/16  Last Fill Quantity: 180,   # refills: 3  Last Office Visit: 2/13  Future Office visit:       Routing refill request to provider for review/approval because:  Drug not on the [facility] or [facility] refill protocol or controlled substance

## 2020-01-16 DIAGNOSIS — H16.222: ICD-10-CM

## 2020-01-20 ENCOUNTER — TELEPHONE (OUTPATIENT)
Dept: OPHTHALMOLOGY | Facility: CLINIC | Age: 85
End: 2020-01-20

## 2020-01-20 RX ORDER — CYCLOSPORINE 0.5 MG/ML
1 EMULSION OPHTHALMIC EVERY 12 HOURS
Qty: 1 BOX | Refills: 11 | Status: SHIPPED | OUTPATIENT
Start: 2020-01-20

## 2020-01-20 NOTE — TELEPHONE ENCOUNTER
Called Pema from [facility] to let her know that I did talk to Dr. Reyes this morning and he stated Nancy does not need to continue the Doxycycline. Dr. Reyes did send through refill for restasis to the [facility] pharmacy.     Cristin Barnard, COA, 8:45 AM 01/20/2020

## 2020-04-10 NOTE — PROGRESS NOTES
: Nancy Benz was seen in X-ray today for a lumbar epidural injection. Patient rated pain before procedure 2/10. After procedure patient rated pain 0/10. This pain level is acceptable to patient. Patient discharged home with her .   Purse String (Simple) Text: Given the location of the defect and the characteristics of the surrounding skin a purse string closure was deemed most appropriate.  Undermining was performed circumfirentially around the surgical defect.  A purse string suture was then placed and tightened.

## 2020-05-31 ENCOUNTER — MYC MEDICAL ADVICE (OUTPATIENT)
Dept: PEDIATRICS | Facility: CLINIC | Age: 85
End: 2020-05-31

## 2023-11-06 NOTE — PROGRESS NOTES
SUBJECTIVE:   Nancy Benz is a 89 year old female who presents to clinic today for the following health issues:      RESPIRATORY SYMPTOMS      Duration: 9 days    Description  cough, wheezing and headache    Severity: moderate    Accompanying signs and symptoms: None    History (predisposing factors):  asthma    Precipitating or alleviating factors: Unable to sleep at night    Therapies tried and outcome:  rest and fluids      Patient was seen at [facility] urgent care for wheezing. CXR was negative for pneumonia. Dx with asthma exacerbation. Given Prednisone burst 40 mg every day for 5 days. She has albuterol neb and Flovent as well. She is feeling a little better. Still coughing.     With the prednisone she has trouble sleeping.  She normally has insomnia issues that has been long-standing.  She has some Advil PM at home, wondering if she could try that.    ROS:  Constitutional, HEENT, cardiovascular, pulmonary, gi and gu systems are negative, except as otherwise noted.         Current Outpatient Medications on File Prior to Visit:  bimatoprost (LUMIGAN) 0.01 % SOLN Place 1 drop into both eyes At Bedtime   brimonidine-timolol (COMBIGAN) 0.2-0.5 % ophthalmic solution Place 1 drop into both eyes 2 times daily   Calcium Carb-Cholecalciferol (CALCIUM 600 + D) 600-200 MG-UNIT TABS Take 1 tablet by mouth daily Vitamin H=5983 iu   cycloSPORINE (RESTASIS) 0.05 % ophthalmic emulsion Place 1 drop into both eyes every 12 hours   FLUoxetine (PROZAC) 20 MG capsule Take 1 capsule (20 mg) by mouth daily   fluticasone (FLOVENT HFA) 220 MCG/ACT Inhaler Inhale 2 puffs into the lungs 2 times daily   gabapentin (NEURONTIN) 100 MG capsule Take 2 capsules (200 mg) by mouth daily (late afternoon)   hydrocortisone 2.5 % ointment Apply topically 2 times daily Apply twice per day up to 1 week, take week off .   ipratropium - albuterol 0.5 mg/2.5 mg/3 mL (DUONEB) 0.5-2.5 (3) MG/3ML Take 1 vial by nebulization every 6 hours as needed for  Specialty Medication Service    Date: 11/6/2023  Patient's Name: Taj Jackson YOB: 1964            _____________________________________________________________________________________________    Left message to schedule PharmD initial appointment for Specialty Medication Services. Please call: [phone] option 4. Will continue to outreach as appropriate.      Meron Nuñez CPhT  Pharmacy   Specialty Medication Services   Phone: [phone] option 4 shortness of breath / dyspnea or wheezing   mirtazapine (REMERON) 7.5 MG tablet TAKE 1 TABLET AT BEDTIME   polyethylene glycol 0.4%- propylene glycol 0.3% (SYSTANE) 0.4-0.3 % SOLN ophthalmic solution Place 1 drop into both eyes 3 times daily as needed for dry eyes   pravastatin (PRAVACHOL) 40 MG tablet TAKE 1/2 TABLET EVERY DAY   predniSONE (DELTASONE) 20 MG tablet Take 40 mg by mouth daily For 5 days.   senna-docusate (SENOKOT-S;PERICOLACE) 8.6-50 MG per tablet Take 1 tablet by mouth daily   albuterol (2.5 MG/3ML) 0.083% neb solution Take 1 vial by nebulization every 6 hours as needed for shortness of breath / dyspnea or wheezing   albuterol (PROAIR HFA/PROVENTIL HFA/VENTOLIN HFA) 108 (90 BASE) MCG/ACT Inhaler Inhale 2 puffs into the lungs every 4 hours as needed for shortness of breath / dyspnea or wheezing   Respiratory Therapy Supplies ROBERTO Optichamber or equivalent to use with spacer.     No current facility-administered medications on file prior to visit.        Patient Active Problem List   Diagnosis     Glaucoma     Keratoconjunctivitis sicca, not specified as Sjogren's, left     Cataract     Pseudophakia of both eyes     Presbyopia     Monoclonal gammopathy     Nonrheumatic aortic valve insufficiency     Dizziness     History of vertebral fracture     Nocturnal oxygen desaturation     Urgency incontinence     Hyperlipidemia LDL goal <130     Depression with anxiety     Restless leg syndrome     Mild persistent asthma without complication     ACP (advance care planning)     Chronic constipation     Prediabetes     Simple chronic bronchitis (H)     Senile osteoporosis     Essential hypertension     Past Surgical History:   Procedure Laterality Date     CATARACT IOL, RT/LT       PHACOEMULSIFICATION CLEAR CORNEA WITH STANDARD INTRAOCULAR LENS IMPLANT Left 7/10/07     PHACOEMULSIFICATION WITH INTRAOCULAR LENS IMPLANT, TRABECULECTOMY, COMBINED Right 4/30/01     REPAIR PTOSIS Bilateral 9/12/2016    Procedure:  REPAIR PTOSIS;  Surgeon: Deyvi Lei MD;  Location: [facility] OR     REPAIR PTOSIS BROW Left 9/12/2016    Procedure: REPAIR PTOSIS BROW;  Surgeon: Deyvi Lei MD;  Location: [facility] OR       Social History     Tobacco Use     Smoking status: Never Smoker     Smokeless tobacco: Never Used   Substance Use Topics     Alcohol use: Yes     Family History   Problem Relation Age of Onset     Glaucoma Son      Glaucoma Daughter              Problem list, Medication list, Allergies, and Medical/Social/Surgical histories reviewed in [facility] and updated as appropriate.    OBJECTIVE:                                                    /69   Pulse 65   Temp 97  F (36.1  C) (Temporal)   Wt 58.5 kg (129 lb)   SpO2 94%   BMI 25.19 kg/m      GENERAL: healthy, alert and no distress  HEENT: unremarkable  Neck: no adenopathy/mass/stiffness. Thyroid normal.  Lung: diffuse wheezing bilaterally  Heart: RRR, normal S1/2, no murmur/gallop/rup        Diagnostic test results:        ASSESSMENT/PLAN:                                                      89 year old female with the following diagnoses and treatment plan:      ICD-10-CM    1. Moderate persistent asthma with exacerbation J45.41    2. Chronic insomnia F51.04        -- complete prednisone burst. Continue with Flovent and albuterol neb. Follow up next week to recheck.   --For chronic insomnia, I recommended that she try Tylenol PM rather than Advil PM.    Will call or return to clinic if worsening or symptoms not improving as discussed.  See Patient Instructions.      Candy Trujillo MD-PhD  [facility]    (Note: Chart documentation was done in part with Dragon Voice Recognition software. Although reviewed after completion, some word and grammatical errors may remain.)

## 2023-12-08 NOTE — NURSING NOTE
Patient in today for nail care. Patient does not have any complaints of pain at this time.  Patient's PCP is David Benjamin MD date of last ov 8/28/23            Jorge Villatoro LPN Patient presents with:  Glaucoma Follow Up: Pt currently using Combigan BID OU and Lumbigan QHS OU.  Last gtt 8am this morning.  Compliant      Referring Provider:  No referring provider defined for this encounter.    HPI    Last Eye Exam:  5/8/18   Informant(s):  EMR   Affected eye(s):  Both   Symptoms:     Blurred vision   Decreased vision   Difficulty with reading      Frequency:  Intermittent, Daily       Do you have eye pain now?:  No      Comments:  Pt currently using restasis BID each eye and Systane ultra 6-8 times a day.  If she doesn't put them in she doesn't see very well.  Has to use a magnifier to read.              Haven Cuevas, COT